# Patient Record
Sex: FEMALE | Race: WHITE | NOT HISPANIC OR LATINO | Employment: UNEMPLOYED | URBAN - METROPOLITAN AREA
[De-identification: names, ages, dates, MRNs, and addresses within clinical notes are randomized per-mention and may not be internally consistent; named-entity substitution may affect disease eponyms.]

---

## 2017-10-03 ENCOUNTER — HOSPITAL ENCOUNTER (INPATIENT)
Facility: HOSPITAL | Age: 47
LOS: 7 days | Discharge: HOME | DRG: 459 | End: 2017-10-10
Attending: EMERGENCY MEDICINE | Admitting: NEUROLOGICAL SURGERY
Payer: MEDICAID

## 2017-10-03 ENCOUNTER — ANESTHESIA EVENT (OUTPATIENT)
Dept: SURGERY | Facility: HOSPITAL | Age: 47
DRG: 459 | End: 2017-10-03
Payer: MEDICAID

## 2017-10-03 DIAGNOSIS — T14.90XA TRAUMA: ICD-10-CM

## 2017-10-03 DIAGNOSIS — R41.82 ALTERED MENTAL STATUS: ICD-10-CM

## 2017-10-03 DIAGNOSIS — S32.001A CLOSED BURST FRACTURE OF LUMBAR VERTEBRA, INITIAL ENCOUNTER: ICD-10-CM

## 2017-10-03 DIAGNOSIS — M47.816 LUMBAR SPONDYLOSIS: ICD-10-CM
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
## 2017-10-03 DIAGNOSIS — S32.001D CLOSED BURST FRACTURE OF LUMBAR VERTEBRA WITH ROUTINE HEALING, SUBSEQUENT ENCOUNTER: ICD-10-CM

## 2017-10-03 DIAGNOSIS — S92.251S CLOSED DISPLACED FRACTURE OF NAVICULAR BONE OF RIGHT FOOT, SEQUELA: ICD-10-CM
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
## 2017-10-03 DIAGNOSIS — Z01.810 PREOP CARDIOVASCULAR EXAM: ICD-10-CM
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
## 2017-10-03 DIAGNOSIS — M25.579 ANKLE PAIN: ICD-10-CM

## 2017-10-03 DIAGNOSIS — S32.001A BURST FRACTURE OF LUMBAR VERTEBRA: Primary | ICD-10-CM

## 2017-10-03 DIAGNOSIS — S32.009A LUMBAR VERTEBRAL FRACTURE: ICD-10-CM

## 2017-10-03 DIAGNOSIS — R09.02 HYPOXIA: ICD-10-CM

## 2017-10-03 DIAGNOSIS — Z01.818 PRE-OPERATIVE EXAMINATION FOR INTERNAL MEDICINE: ICD-10-CM

## 2017-10-03 DIAGNOSIS — G95.20 CORD COMPRESSION: ICD-10-CM

## 2017-10-03 DIAGNOSIS — S32.020A TRAUMATIC COMPRESSION FRACTURE OF L2 LUMBAR VERTEBRA, CLOSED, INITIAL ENCOUNTER: ICD-10-CM

## 2017-10-03 DIAGNOSIS — M48.061 SPINAL STENOSIS OF LUMBAR REGION WITHOUT NEUROGENIC CLAUDICATION: ICD-10-CM

## 2017-10-03 DIAGNOSIS — S92.251A CLOSED DISPLACED FRACTURE OF NAVICULAR BONE OF RIGHT FOOT, INITIAL ENCOUNTER: ICD-10-CM
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
## 2017-10-03 PROBLEM — S92.001A FRACTURE OF RIGHT CALCANEUS: Status: ACTIVE | Noted: 2017-10-03

## 2017-10-03 LAB
ABO + RH BLD: NORMAL
ALBUMIN SERPL BCP-MCNC: 3.6 G/DL
ALP SERPL-CCNC: 111 U/L
ALT SERPL W/O P-5'-P-CCNC: 21 U/L
ANION GAP SERPL CALC-SCNC: 11 MMOL/L
APTT BLDCRRT: 22.1 SEC
AST SERPL-CCNC: 33 U/L
BASOPHILS # BLD AUTO: 0.02 K/UL
BASOPHILS NFR BLD: 0.1 %
BILIRUB SERPL-MCNC: 0.5 MG/DL
BLD GP AB SCN CELLS X3 SERPL QL: NORMAL
BUN SERPL-MCNC: 13 MG/DL
CALCIUM SERPL-MCNC: 9.8 MG/DL
CHLORIDE SERPL-SCNC: 106 MMOL/L
CO2 SERPL-SCNC: 22 MMOL/L
CREAT SERPL-MCNC: 0.8 MG/DL
DIFFERENTIAL METHOD: ABNORMAL
EOSINOPHIL # BLD AUTO: 0 K/UL
EOSINOPHIL NFR BLD: 0 %
ERYTHROCYTE [DISTWIDTH] IN BLOOD BY AUTOMATED COUNT: 13.9 %
EST. GFR  (AFRICAN AMERICAN): >60 ML/MIN/1.73 M^2
EST. GFR  (NON AFRICAN AMERICAN): >60 ML/MIN/1.73 M^2
ETHANOL SERPL-MCNC: <10 MG/DL
GLUCOSE SERPL-MCNC: 116 MG/DL
HCT VFR BLD AUTO: 43.3 %
HGB BLD-MCNC: 14.9 G/DL
INR PPP: 1
LYMPHOCYTES # BLD AUTO: 1.8 K/UL
LYMPHOCYTES NFR BLD: 8.6 %
MCH RBC QN AUTO: 29.9 PG
MCHC RBC AUTO-ENTMCNC: 34.4 G/DL
MCV RBC AUTO: 87 FL
MONOCYTES # BLD AUTO: 1.2 K/UL
MONOCYTES NFR BLD: 5.5 %
NEUTROPHILS # BLD AUTO: 17.9 K/UL
NEUTROPHILS NFR BLD: 85 %
PLATELET # BLD AUTO: 396 K/UL
PMV BLD AUTO: 9.7 FL
POTASSIUM SERPL-SCNC: 3.9 MMOL/L
PROT SERPL-MCNC: 7.6 G/DL
PROTHROMBIN TIME: 11 SEC
RBC # BLD AUTO: 4.99 M/UL
SODIUM SERPL-SCNC: 139 MMOL/L
WBC # BLD AUTO: 21.04 K/UL

## 2017-10-03 PROCEDURE — 86920 COMPATIBILITY TEST SPIN: CPT

## 2017-10-03 PROCEDURE — 85730 THROMBOPLASTIN TIME PARTIAL: CPT

## 2017-10-03 PROCEDURE — 80320 DRUG SCREEN QUANTALCOHOLS: CPT

## 2017-10-03 PROCEDURE — 25500020 PHARM REV CODE 255: Performed by: EMERGENCY MEDICINE

## 2017-10-03 PROCEDURE — 99291 CRITICAL CARE FIRST HOUR: CPT | Mod: ,,, | Performed by: PHYSICIAN ASSISTANT

## 2017-10-03 PROCEDURE — 80053 COMPREHEN METABOLIC PANEL: CPT

## 2017-10-03 PROCEDURE — 63600175 PHARM REV CODE 636 W HCPCS: Performed by: PHYSICIAN ASSISTANT

## 2017-10-03 PROCEDURE — 25000003 PHARM REV CODE 250: Performed by: STUDENT IN AN ORGANIZED HEALTH CARE EDUCATION/TRAINING PROGRAM

## 2017-10-03 PROCEDURE — 63600175 PHARM REV CODE 636 W HCPCS: Performed by: EMERGENCY MEDICINE

## 2017-10-03 PROCEDURE — 20600001 HC STEP DOWN PRIVATE ROOM

## 2017-10-03 PROCEDURE — 99223 1ST HOSP IP/OBS HIGH 75: CPT | Mod: ,,, | Performed by: PHYSICIAN ASSISTANT

## 2017-10-03 PROCEDURE — 99233 SBSQ HOSP IP/OBS HIGH 50: CPT | Mod: ,,, | Performed by: INTERNAL MEDICINE

## 2017-10-03 PROCEDURE — 86901 BLOOD TYPING SEROLOGIC RH(D): CPT

## 2017-10-03 PROCEDURE — 25000003 PHARM REV CODE 250: Performed by: EMERGENCY MEDICINE

## 2017-10-03 PROCEDURE — 93005 ELECTROCARDIOGRAM TRACING: CPT

## 2017-10-03 PROCEDURE — 86900 BLOOD TYPING SEROLOGIC ABO: CPT

## 2017-10-03 PROCEDURE — 93010 ELECTROCARDIOGRAM REPORT: CPT | Mod: ,,, | Performed by: INTERNAL MEDICINE

## 2017-10-03 PROCEDURE — 85025 COMPLETE CBC W/AUTO DIFF WBC: CPT

## 2017-10-03 PROCEDURE — 85610 PROTHROMBIN TIME: CPT

## 2017-10-03 PROCEDURE — 99291 CRITICAL CARE FIRST HOUR: CPT | Mod: 25

## 2017-10-03 PROCEDURE — 96374 THER/PROPH/DIAG INJ IV PUSH: CPT

## 2017-10-03 RX ORDER — OXYCODONE AND ACETAMINOPHEN 5; 325 MG/1; MG/1
1 TABLET ORAL EVERY 4 HOURS PRN
Status: DISCONTINUED | OUTPATIENT
Start: 2017-10-03 | End: 2017-10-03

## 2017-10-03 RX ORDER — CLONAZEPAM 1 MG/1
2 TABLET ORAL NIGHTLY
Status: DISCONTINUED | OUTPATIENT
Start: 2017-10-04 | End: 2017-10-10 | Stop reason: HOSPADM

## 2017-10-03 RX ORDER — MORPHINE SULFATE 2 MG/ML
2 INJECTION, SOLUTION INTRAMUSCULAR; INTRAVENOUS
Status: COMPLETED | OUTPATIENT
Start: 2017-10-03 | End: 2017-10-03

## 2017-10-03 RX ORDER — LABETALOL HYDROCHLORIDE 5 MG/ML
10 INJECTION, SOLUTION INTRAVENOUS EVERY 10 MIN PRN
Status: DISCONTINUED | OUTPATIENT
Start: 2017-10-03 | End: 2017-10-10 | Stop reason: HOSPADM

## 2017-10-03 RX ORDER — GLUCAGON 1 MG
1 KIT INJECTION
Status: DISCONTINUED | OUTPATIENT
Start: 2017-10-03 | End: 2017-10-10 | Stop reason: HOSPADM

## 2017-10-03 RX ORDER — DIAZEPAM 5 MG/1
5 TABLET ORAL EVERY 6 HOURS PRN
Status: DISCONTINUED | OUTPATIENT
Start: 2017-10-03 | End: 2017-10-10 | Stop reason: HOSPADM

## 2017-10-03 RX ORDER — ACETAMINOPHEN 500 MG
1000 TABLET ORAL
Status: COMPLETED | OUTPATIENT
Start: 2017-10-03 | End: 2017-10-03

## 2017-10-03 RX ORDER — OXYCODONE AND ACETAMINOPHEN 5; 325 MG/1; MG/1
1 TABLET ORAL EVERY 4 HOURS PRN
Status: DISCONTINUED | OUTPATIENT
Start: 2017-10-04 | End: 2017-10-04

## 2017-10-03 RX ORDER — SODIUM CHLORIDE 9 MG/ML
INJECTION, SOLUTION INTRAVENOUS CONTINUOUS
Status: DISCONTINUED | OUTPATIENT
Start: 2017-10-03 | End: 2017-10-05

## 2017-10-03 RX ORDER — MORPHINE SULFATE 2 MG/ML
2 INJECTION, SOLUTION INTRAMUSCULAR; INTRAVENOUS EVERY 4 HOURS PRN
Status: DISCONTINUED | OUTPATIENT
Start: 2017-10-03 | End: 2017-10-04

## 2017-10-03 RX ORDER — IBUPROFEN 200 MG
16 TABLET ORAL
Status: DISCONTINUED | OUTPATIENT
Start: 2017-10-03 | End: 2017-10-10 | Stop reason: HOSPADM

## 2017-10-03 RX ORDER — IBUPROFEN 200 MG
24 TABLET ORAL
Status: DISCONTINUED | OUTPATIENT
Start: 2017-10-03 | End: 2017-10-10 | Stop reason: HOSPADM

## 2017-10-03 RX ADMIN — ACETAMINOPHEN 1000 MG: 500 TABLET ORAL at 12:10

## 2017-10-03 RX ADMIN — DIAZEPAM 5 MG: 5 TABLET ORAL at 10:10

## 2017-10-03 RX ADMIN — OXYCODONE HYDROCHLORIDE AND ACETAMINOPHEN 1 TABLET: 5; 325 TABLET ORAL at 10:10

## 2017-10-03 RX ADMIN — MORPHINE SULFATE 2 MG: 2 INJECTION, SOLUTION INTRAMUSCULAR; INTRAVENOUS at 11:10

## 2017-10-03 RX ADMIN — MORPHINE SULFATE 2 MG: 2 INJECTION, SOLUTION INTRAMUSCULAR; INTRAVENOUS at 01:10

## 2017-10-03 RX ADMIN — SODIUM CHLORIDE: 0.9 INJECTION, SOLUTION INTRAVENOUS at 10:10

## 2017-10-03 RX ADMIN — IOHEXOL 75 ML: 350 INJECTION, SOLUTION INTRAVENOUS at 01:10

## 2017-10-03 NOTE — ASSESSMENT & PLAN NOTE
47 y.o. female s/p 30-ft fall from tree with L2 burst fracture with central retropulsion.    - Admit to nsgy.  - LSO brace at all times. Bed rest.  - MRI pending.  - OR tomorrow for lateral L2 corpectomy and L1-3 posterior fusion. Case booked. NPO at midnight.  - UPT, APTT, INR pending. 2 units pRBC on hold for OR tomorrow.  - Please measure strict urine output and check PVR. Please notify neurosurgery of any episodes of incontinence.  - No steroids at this time.  - Ortho: WBAT for +/- pubic ramus fracture. Navicular fracture splinted, no surgical intervention warranted.  - General surgery consulted by ED, pending recs.  - Hospital medicine consulted for surgical clearance. Appreciate recs.

## 2017-10-03 NOTE — HPI
47 years old female with Hx of drug abuse, anxiety on chronic benzo and withdrawal seizures. Admitted from ER for NS interventions for lumbar Fx after fall from 30ft tree today in her back. Patient was sleepy during my interview after morphine. She had no Hx of chronic cardiac or pulm disease. Last hospital admission was for withdrawal seizures 2/2 benzo.   She denied any CP, SOB, MCCALL, abdominal pain, SOB, fever, cough,  Sx. She had croup 3 days ago with fever and barking cough, used OTC cough suppressant.   She reported snorting blue meth last week, denied alcohol abuse, Hx of STI/STDs or any Hx of endocarditis or bone infection.

## 2017-10-03 NOTE — ED NOTES
Patient placed on bedpain to obtain urine specimen. States unable to go at this time. Awaiting urine.

## 2017-10-03 NOTE — ASSESSMENT & PLAN NOTE
Candy Blakely is a 46 yo female with right navicular fracture and cortical irregularities of right inferior pubic ramus  - Placed in right short leg dorsal slab splint  - NWB RLE  - No acute orthopedic intervention  - No visible pelvis fracture noted on CT, WBAT through pelvis  - For management of L2 fracture, please see neurosurgical note  - Recommend PT/OT when stable from neurosurgical standpoint  - Recommend consultation to general surgery for trauma evaluation

## 2017-10-03 NOTE — HPI
Candy Blakely is a 47 y.o. female PMH substance abuse who presents s/p approximate 30-foot fall from a tree while intoxicated today. Pt presented with complaints of low back and pelvic pain. Plain imaging revealed fractures of the right inferior pubic ramus, right navicula, and severe burst fracture of L2. CT revealed aforementioned burst fracture with centrally displaced bone fragments and severe cord compression. Neurosurgery was consulted for evaluation.

## 2017-10-03 NOTE — CONSULTS
[facility]  Neurosurgery  Consult Note    Inpatient consult to Neurosurgery  Consult performed by: YAENT GILES  Consult ordered by: ROMEO WASHINGTON        Subjective:     Chief Complaint/Reason for Admission: Fall    History of Present Illness: Candy Blakely is a 47 y.o. female PMH substance abuse who presents s/p approximate 30-foot fall from a tree while intoxicated today. Pt presented with complaints of low back and pelvic pain. Plain imaging revealed fractures of the right inferior pubic ramus, right navicula, and severe burst fracture of L2. CT revealed aforementioned burst fracture with centrally displaced bone fragments and severe cord compression. Neurosurgery was consulted for evaluation.      (Not in a hospital admission)    Review of patient's allergies indicates:  No Known Allergies    Past Medical History:   Diagnosis Date    ADHD (attention deficit hyperactivity disorder)     Anxiety      Past Surgical History:   Procedure Laterality Date    TUBAL LIGATION  2003     Family History     None        Social History Main Topics    Smoking status: Current Every Day Smoker     Packs/day: 0.50     Types: Cigarettes    Smokeless tobacco: Not on file    Alcohol use No    Drug use:      Types: IV    Sexual activity: Not on file      Comment: Heroin occasionally     Review of Systems   Constitutional: Negative for chills and fever.   HENT: Negative for facial swelling, trouble swallowing and voice change.    Eyes: Negative for photophobia and discharge.   Respiratory: Negative for shortness of breath and wheezing.    Cardiovascular: Negative for chest pain and palpitations.   Gastrointestinal: Negative for abdominal distention and abdominal pain.        No bowel incontinence   Genitourinary: Negative for difficulty urinating and dysuria.   Musculoskeletal: Positive for back pain and myalgias.   Neurological: Negative for speech difficulty and numbness.     Objective:         There is no height or weight on file to calculate BMI.  Vital Signs (Most Recent):  Temp: 97 °F (36.1 °C) (10/03/17 1125)  Pulse: 66 (10/03/17 1125)  Resp: 16 (10/03/17 1125)  BP: (!) 104/52 (10/03/17 1125)  SpO2: 100 % (10/03/17 1125) Vital Signs (24h Range):  Temp:  [97 °F (36.1 °C)] 97 °F (36.1 °C)  Pulse:  [66] 66  Resp:  [16] 16  SpO2:  [100 %] 100 %  BP: (104)/(52) 104/52                           Neurosurgery Physical Exam  General: well developed, well nourished, no distress.   Head: normocephalic, atraumatic  Neurologic: Alert and oriented. Thought content appropriate.  GCS: Motor: 6/Verbal: 5/Eyes: 4 GCS Total: 15  Mental Status: Awake, Alert, Oriented x 4  Language: No aphasia  Speech: No dysarthria  Cranial nerves: face symmetric, tongue midline, CN II-XII grossly intact.   Eyes: pupils equal, round, reactive to light with accomodation, EOMI.  Pulmonary: normal respirations, no signs of respiratory distress  Abdomen: soft, non-distended, not tender to palpation  Sensory: intact to light touch throughout    Motor Strength: Moves all extremities spontaneously with good tone. No abnormal movements seen.     Strength  Deltoids Triceps Biceps Wrist Extension Wrist Flexion Hand    Upper: R 5/5 5/5 5/5 5/5 5/5 5/5    L 5/5 5/5 5/5 5/5 5/5 5/5     Iliopsoas Quadriceps Knee  Flexion Tibialis  anterior Gastro- cnemius EHL   Lower: R 3/5 4-/5 4/5 5/5 N/A N/A    L 3/5 4-/5 4/5 5/5 5/5 5/5     DTR's: 2+ and symmetric in UE; 3 + and symmetric in LE, unable to assess R achilles 2/2 splinting  Pronator Drift: no drift noted  Finger-to-nose: Intact bilaterally  Lord: absent  Clonus: absent on left, unable to assess on right due to R splint  Babinski: absent on left, unable to assess on right due to R splint  Pulses: 2+ and symmetric radial and dorsalis pedis.  Skin: Skin is warm, dry and intact.    Significant Labs:    Recent Labs  Lab 10/03/17  1500   *      K 3.9      CO2 22*   BUN 13    CREATININE 0.8   CALCIUM 9.8       Recent Labs  Lab 10/03/17  1234   WBC 21.04*   HGB 14.9   HCT 43.3   *     No results for input(s): LABPT, INR, APTT in the last 48 hours.  Microbiology Results (last 7 days)     ** No results found for the last 168 hours. **        Significant Diagnostics:    Xray lumbar spine reviewed and shows severe compression deformity of L2.  CT cervical spine reviewed and shows no evidence for acute fracture.  CT lumbar spine reviewed and shows burst fracture of L2 with retropulsion resulting in severe central stenosis. There is grade I anterolisthesis of L5 on S1.  MRI lumbar spine pending.  CT c/a/p reviewed and shows no compression deformity in the thoracic spine.     Assessment/Plan:     * Burst fracture of lumbar vertebra    47 y.o. female s/p 30-ft fall from tree with L2 burst fracture with central retropulsion.    - Admit to nsgy.  - LSO brace at all times. Bed rest.  - MRI pending.  - OR tomorrow for lateral L2 corpectomy and L1-3 posterior fusion. Case booked. NPO at midnight.  - UPT, APTT, INR pending. 2 units pRBC on hold for OR tomorrow.  - Please measure strict urine output and check PVR. Please notify neurosurgery of any episodes of incontinence.  - No steroids at this time.  - Ortho: WBAT for +/- pubic ramus fracture. Navicular fracture splinted, no surgical intervention warranted.  - General surgery consulted by ED, pending recs.  - Hospital medicine consulted for surgical clearance. Appreciate recs.              Discussed with Dr. Gasca.    Nancy Manning PA-C  Neurosurgery  [facility]

## 2017-10-03 NOTE — ED TRIAGE NOTES
"Reports falling from "[place]" in [place]. States branches were dry near the top and branch broke and she fell approx 30feet. Reports back pain and right ankle pain.  "

## 2017-10-03 NOTE — CONSULTS
"[facility] Hospital Medicine  Consult Note    Patient Name: Candy Blakely  MRN: [id number]  Admission Date: 10/3/2017  Hospital Length of Stay: 0 days  Attending Physician: Charlette Kunz MD   Primary Care Provider: Primary Doctor [facility] Medicine Team: Networked reference to record PCT  MATHIEU Dias      Patient information was obtained from patient, parent, past medical records and ER records.     Inpatient consult to [facility] Medicine-General  Consult performed by: KAROLINA MIRANDA  Consult ordered by: YANET GILES  Reason for consult: preop        Subjective:     Principal Problem: Burst fracture of lumbar vertebra    Chief Complaint:   Chief Complaint   Patient presents with    Fall     patient states she "fell out of the tree of life" while trying to climb up; denies LOC        HPI: 47 years old female with Hx of drug abuse, anxiety on chronic benzo and withdrawal seizures. Admitted from ER for NS interventions for lumbar Fx after fall from 30ft tree today in her back. Patient was sleepy during my interview after morphine. She had no Hx of chronic cardiac or pulm disease. Last hospital admission was for withdrawal seizures 2/2 benzo.   She denied any CP, SOB, MCCALL, abdominal pain, SOB, fever, cough,  Sx. She had croup 3 days ago with fever and barking cough, used OTC cough suppressant.   She reported snorting blue meth last week, denied alcohol abuse, Hx of STI/STDs or any Hx of endocarditis or bone infection.       Past Medical History:   Diagnosis Date    ADHD (attention deficit hyperactivity disorder)     Anxiety        Past Surgical History:   Procedure Laterality Date    TUBAL LIGATION  2003       Review of patient's allergies indicates:  No Known Allergies    No current facility-administered medications on file prior to encounter.      Current Outpatient Prescriptions on File Prior to Encounter   Medication Sig    clonazePAM (KLONOPIN) 1 MG " tablet Take 2 mg by mouth every evening.    dextroamphetamine-amphetamine (AMPHETAMINE SALT COMBO) 30 mg Tab Take by mouth once daily.     Family History     None        Social History Main Topics    Smoking status: Current Every Day Smoker     Packs/day: 0.50     Types: Cigarettes    Smokeless tobacco: Not on file    Alcohol use No    Drug use:      Types: IV    Sexual activity: Not on file      Comment: Heroin occasionally     Review of Systems   Constitutional: Negative for chills, fever and unexpected weight change.   HENT: Negative for ear discharge, ear pain, mouth sores and trouble swallowing.    Eyes: Negative for pain and redness.   Respiratory: Negative for cough and shortness of breath.    Cardiovascular: Negative for chest pain and palpitations.   Gastrointestinal: Negative for abdominal distention, abdominal pain, blood in stool and nausea.   Endocrine: Negative for polydipsia and polyphagia.   Genitourinary: Negative for dysuria and hematuria.   Musculoskeletal: Negative for gait problem and neck stiffness.   Skin: Negative for rash.   Neurological: Negative for dizziness, weakness and numbness.   Hematological: Negative for adenopathy. Does not bruise/bleed easily.   Psychiatric/Behavioral: Negative for decreased concentration and sleep disturbance.     Objective:     Vital Signs (Most Recent):  Temp: 97 °F (36.1 °C) (10/03/17 1125)  Pulse: 66 (10/03/17 1125)  Resp: 16 (10/03/17 1125)  BP: (!) 104/52 (10/03/17 1125)  SpO2: 100 % (10/03/17 1125) Vital Signs (24h Range):  Temp:  [97 °F (36.1 °C)] 97 °F (36.1 °C)  Pulse:  [66] 66  Resp:  [16] 16  SpO2:  [100 %] 100 %  BP: (104)/(52) 104/52        There is no height or weight on file to calculate BMI.    Physical Exam   Constitutional: She is oriented to person, place, and time. She appears well-developed and well-nourished. No distress. Cervical collar in place.   HENT:   Head: Normocephalic and atraumatic.   Eyes: Pupils are equal, round, and  reactive to light. No scleral icterus.   Neck: Neck supple.   Cardiovascular: Normal rate, regular rhythm and normal heart sounds.    No murmur heard.  Pulmonary/Chest: Effort normal and breath sounds normal.   Abdominal: Soft. Bowel sounds are normal. She exhibits no distension. There is no tenderness.   Musculoskeletal: Normal range of motion. She exhibits no edema or tenderness.   Cast in the right leg   Lymphadenopathy:     She has no cervical adenopathy.   Neurological: She is alert and oriented to person, place, and time. She exhibits normal muscle tone. Coordination normal.   Skin: No rash noted. No erythema.       Significant Labs:   BMP:   Recent Labs  Lab 10/03/17  1500   *      K 3.9      CO2 22*   BUN 13   CREATININE 0.8   CALCIUM 9.8     CBC:   Recent Labs  Lab 10/03/17  1234   WBC 21.04*   HGB 14.9   HCT 43.3   *       Significant Imaging:     Narrative     Findings: Patient was administered 75 cc of Omnipaque 350.    No mediastinal, hilar or axillary adenopathy is seen.  No hematoma is identified.  There may be chronic gallbladder changes.  Liver, spleen, pancreas show nothing unusual.  The pancreatic duct is slightly prominent.  The common bile duct is dilated 1.3 cm.  No adrenal pathology is seen.  Kidneys enhance normally.  No ascites is seen.  Bowel loops are unremarkable.  Pelvic organs show nothing unusual.  Kidneys and ureters are normal.  No ureter obstruction is seen.  Bladder is normal.  There is a severe comminuted compression wedge burst fracture of L2 with a posterior fragment in the canal causing severe canal stenosis.  No pelvic fracture seen.  There is slight cortical irregularity of the right inferior pubic ramus thought to be developmental.  No pneumothorax or lung contusion seen.   Impression      Unstable severe wedge compression burst fracture of the L2 vertebral body involving the anterior middle column making this unstable.  There is a fragment in the  canal.    No pelvic fracture seen.  No organ injury.    Incidental findings include dilatation of the common bile duct.  Correlate with liver function studies this is unlikely to be traumatic.  There are also chronic changes of the gallbladder.  GI consultation may be warranted at a later time.        Electronically signed by: LILI PLATT  Date: 10/03/17  Time: 14:32      Findings: Patient was administered 75 cc of Omnipaque 350.    No mediastinal, hilar or axillary adenopathy is seen.  No hematoma is identified.  There may be chronic gallbladder changes.  Liver, spleen, pancreas show nothing unusual.  The pancreatic duct is slightly prominent.  The common bile duct is dilated 1.3 cm.  No adrenal pathology is seen.  Kidneys enhance normally.  No ascites is seen.  Bowel loops are unremarkable.  Pelvic organs show nothing unusual.  Kidneys and ureters are normal.  No ureter obstruction is seen.  Bladder is normal.  There is a severe comminuted compression wedge burst fracture of L2 with a posterior fragment in the canal causing severe canal stenosis.  No pelvic fracture seen.  There is slight cortical irregularity of the right inferior pubic ramus thought to be developmental.  No pneumothorax or lung contusion seen.   Impression      Unstable severe wedge compression burst fracture of the L2 vertebral body involving the anterior middle column making this unstable.  There is a fragment in the canal.    No pelvic fracture seen.  No organ injury.    Incidental findings include dilatation of the common bile duct.  Correlate with liver function studies this is unlikely to be traumatic.  There are also chronic changes of the gallbladder.  GI consultation may be warranted at a later time.        Electronically signed by: LILI PLATT  Date: 10/03/17  Time: 14:32      Narrative     CT cervical spine without contrast    Clinical history: Fall    Comparison: None    Technique:  Axial images of the cervical spine were  obtained at 2 mm intervals without administration of IV contrast.  Coronal and sagittal reformatted images were reviewed.    Findings:  Degenerative changes are noted of the right temporomandibular joint.  There is opacification of the maxillary sinuses.  There is a mucous attention cyst or polyp within the left sphenoid sinus.    Lateral imaging demonstrates adequate alignment of the cervical spine without significant vertebral body height loss or disc space height loss.  No acute displaced fracture or dislocation of the cervical spine.  No severe spinal canal stenosis or severe neuroforaminal narrowing at any level.  The lung apices are grossly clear.  The paraspinous and hypopharyngeal soft tissues are grossly unremarkable noting minimal subcutaneous edema within the soft tissues posterior to C6-T2.  Mild degenerative changes are noted of the cervical spine.  The airway is patent.  Limited dilation of the intracranial content is grossly unremarkable.   Impression       1.  No acute displaced fracture or dislocation of the cervical spine.    2.  Sinus disease.      Electronically signed by: KEYONA TUCKER MD  Date: 10/03/17  Time: 13:50      Narrative     Comparison: None    Clinical history: Altered metal status    Technique:    Axial images of the brain were obtained at 5-mm intervals from the skull base to the vertex without the administration of contrast.    Findings:    Punctate foci of hypoattenuation involving the left thalamus likely reflects volume averaging, differential would include age indeterminate lacunar infarct however felt less likely.  There is no evidence of acute major vascular territory infarct, hemorrhage, or mass.  There is no hydrocephalus.  There are no abnormal extra-axial fluid collections.  There are aerated secretions within the bilateral maxillary sinuses and ethmoid sinuses, otherwise the visualized paranasal sinuses and mastoid air cells are grossly clear.  The visualized soft  tissues are unremarkable.   Impression         No acute intracranial abnormalities, noting punctate foci of hypoattenuation in the left basal ganglia suggest volume averaging or sequela of motion artifact rather than age-indeterminate infarct however correlation with current symptoms recommended..    Sinus disease.            Electronically signed by: KEYONA TUCKER MD  Date: 10/03/17  Time: 13:43          Assessment/Plan:     Pre-op evaluation      -- no Hx of heart or lung active diseases.   -- confirmed with pharmacy the prescription of Klonopin 2mg TID but patient using it as 2 mg QHS PRN. Last time was 3 night ago.   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  -- EKG at the bedside show NSR with QTc 486   -- revised dusty score is 0 and that put her at very low risk (0.4% for major complication )     Plan:   -- please add Klonpin 2 mg nightly as patient had Hx of withdrawal seizure.   -- follow WBC as it likely reactive.   -- urine Tox.                VTE Risk Mitigation     None              Thank you for your consult. I will sign off. Please contact us if you have any additional questions.    MATHIEU Dias  Department of Hospital Medicine   [facility]

## 2017-10-03 NOTE — ED NOTES
LOC: The patient is awake, alert and aware of environment with an appropriate affect, the patient is oriented x 3 and speaking appropriately.  APPEARANCE: Patient resting comfortably and in no acute distress, patient is unkept and disheveled. Multiple teeth missing and cracked.   SKIN: The skin is warm and dry, patient has normal skin turgor and dry mucus membranes, skin intact, no breakdown or brusing noted.  MUSKULOSKELETAL: Patient moving all extremities, pain while moving right ankle. Swelling noted to right ankle.  No obvious deformities noted.  RESPIRATORY: Airway is open and patent, respirations are spontaneous, patient has a normal effort and rate. Breath sounds are clear and equal bilaterally.  CARDIAC: Normal heart sounds. No peripheral edema.  ABDOMEN: Soft and non tender to palpation, no distention noted. Bowel sounds present.  NEURO: No neuro deficits, hand grasp equal, no drift noted, no facial droop noted. Speech is clear.

## 2017-10-03 NOTE — ANESTHESIA PREPROCEDURE EVALUATION
Pre-operative evaluation for Procedure(s) (LRB):  DIRECT LATERAL INTERBODY FUSION/Lateral L2 corpectomy with posterior L1-3 fusion (N/A)    Candy Blakely is a 47 y.o. female w/ no significant PMHx. Per notes, Hx of substance abuse (pt admits to use of methamphetamine several days ago). She presents to the ED via personal vehicle s/p fall. She reports falling from approximately 30 feet from a tree in [facility]. She is a current every day smoker, 0.5 pdd. No hx of anesthesia complications.     LDA:   R forearm 20 g PIV   L hand 22 g PIV     Prev airway: none on record       There is no problem list on file for this patient.    CT C spine  Technique:  Axial images of the cervical spine were obtained at 2 mm intervals without administration of IV contrast.  Coronal and sagittal reformatted images were reviewed.    Findings:  Degenerative changes are noted of the right temporomandibular joint.  There is opacification of the maxillary sinuses.  There is a mucous attention cyst or polyp within the left sphenoid sinus.    Lateral imaging demonstrates adequate alignment of the cervical spine without significant vertebral body height loss or disc space height loss.  No acute displaced fracture or dislocation of the cervical spine.  No severe spinal canal stenosis or severe neuroforaminal narrowing at any level.  The lung apices are grossly clear.  The paraspinous and hypopharyngeal soft tissues are grossly unremarkable noting minimal subcutaneous edema within the soft tissues posterior to C6-T2.  Mild degenerative changes are noted of the cervical spine.  The airway is patent.  Limited dilation of the intracranial content is grossly unremarkable.    CT L spine  1. Acute compression deformity of the L2 vertebral body with retropulsion resulting in severe spinal canal stenosis.    2. Remaining lumbar spine demonstrates mild spondylosis without significant  spinal canal stenosis or neuroforaminal narrowing.    3.  Grade I anterolisthesis of L5 on S1.    Preliminary report discussed with Dr. WASHINGTON by Dr. Rivas at 14:01:02 on Tuesday, October 03, 2017.  ______________________________________     Electronically signed by resident: JUDITH RIVAS MD  Date: 10/03/17  Time: 14:05            As the supervising and teaching physician, I personally reviewed the images and resident's interpretation and I agree with the findings.    Review of patient's allergies indicates:  No Known Allergies     No current facility-administered medications on file prior to encounter.      Current Outpatient Prescriptions on File Prior to Encounter   Medication Sig Dispense Refill    clonazePAM (KLONOPIN) 1 MG tablet Take 2 mg by mouth every evening.      dextroamphetamine-amphetamine (AMPHETAMINE SALT COMBO) 30 mg Tab Take by mouth once daily.         Past Surgical History:   Procedure Laterality Date    TUBAL LIGATION  2003       Social History     Social History    Marital status: Single     Spouse name: N/A    Number of children: N/A    Years of education: N/A     Occupational History    Not on file.     Social History Main Topics    Smoking status: Current Every Day Smoker     Packs/day: 0.50     Types: Cigarettes    Smokeless tobacco: Not on file    Alcohol use No    Drug use:      Types: IV    Sexual activity: Not on file      Comment: Heroin occasionally     Other Topics Concern    Not on file     Social History Narrative    No narrative on file         Vital Signs Range (Last 24H):  Temp:  [36.1 °C (97 °F)]   Pulse:  [66]   Resp:  [16]   BP: (104)/(52)   SpO2:  [100 %]       CBC:   Recent Labs      10/03/17   1234   WBC  21.04*   RBC  4.99   HGB  14.9   HCT  43.3   PLT  396*   MCV  87   MCH  29.9   MCHC  34.4       CMP: No results for input(s): NA, K, CL, CO2, BUN, CREATININE, GLU, MG, PHOS, CALCIUM, ALBUMIN, PROT, ALKPHOS, ALT, AST, BILITOT in the last 72 hours.    INR  No  results for input(s): INR, PROTIME, APTT in the last 72 hours.    Invalid input(s): PT        Diagnostic Studies:      EKG:  None on record     2D Echo:  None on record       Anesthesia Evaluation    I have reviewed the Patient Summary Reports.     I have reviewed the Medications.     Review of Systems  Anesthesia Hx:  No problems with previous Anesthesia Denies Hx of Anesthetic complications  History of prior surgery of interest to airway management or planning:  Denies Personal Hx of Anesthesia complications.   Social:  Smoker    Hematology/Oncology:     Oncology Normal     EENT/Dental:EENT/Dental Normal   Cardiovascular:  Cardiovascular Normal     Pulmonary:  Pulmonary Normal    Renal/:  Renal/ Normal     Hepatic/GI:  Hepatic/GI Normal    Musculoskeletal:  Musculoskeletal Normal    Neurological:  Neurology Normal    Endocrine:  Endocrine Normal    Dermatological:  Skin Normal    Psych:   Psychiatric History          Physical Exam  General:  Well nourished    Airway/Jaw/Neck:  Airway Findings: Mouth Opening: Normal Tongue: Normal  General Airway Assessment: Adult  Mallampati: III   Currently in C collar.    Eyes/Ears/Nose:  EYES/EARS/NOSE FINDINGS: Normal   Dental:  Dental Findings: Periodontal disease, Mild         Mental Status:  Mental Status Findings:  Cooperative, Alert and Oriented         Anesthesia Plan  Type of Anesthesia, risks & benefits discussed:  Anesthesia Type:  general  Patient's Preference:   Intra-op Monitoring Plan: standard ASA monitors and arterial line  Intra-op Monitoring Plan Comments:   Post Op Pain Control Plan:   Post Op Pain Control Plan Comments:   Induction:   IV  Beta Blocker:  Patient is not currently on a Beta-Blocker (No further documentation required).       Informed Consent: Patient understands risks and agrees with Anesthesia plan.  Questions answered. Anesthesia consent signed with patient.  ASA Score: 2     Day of Surgery Review of History & Physical:    H&P update  referred to the surgeon.     Anesthesia Plan Notes: Pt currently in C collar. Discussed awake intubation with pt given C collar currently on and pending management of C spine per NSGY. Consent signed by patient and left with emergency room team after obtaining witness signature.         Ready For Surgery From Anesthesia Perspective.

## 2017-10-03 NOTE — SUBJECTIVE & OBJECTIVE
Principal Problem:Burst fracture of lumbar vertebra    Principal Orthopedic Problem: navicular fracture and possible right sided inferior pubic ramus fracture    HPI: Candy Blakely is a 46 yo female with history of heroine use (per chart review) presenting with low back pain and right foot pain after fall from tree. Patient was climbing a tree in [facility], had her foot slip and fell from substantial height onto her low back. Patient states a friend saw her and thought the fall was about 30 feet. Denies numbness, tingling, or paresthesias to upper extremities, lower extremity or groin. Denies bowel or bladder incontinence. Denies other MSK pains. Denies head injury or LOC.    ROS: denies chest pain, SOB, nausea, vomiting, for full ROS please see H&P    Review of patient's allergies indicates:  No Known Allergies    No current facility-administered medications for this encounter.      Current Outpatient Prescriptions   Medication Sig    clonazePAM (KLONOPIN) 1 MG tablet Take 2 mg by mouth every evening.    dextroamphetamine-amphetamine (AMPHETAMINE SALT COMBO) 30 mg Tab Take by mouth once daily.     Objective:     Vital Signs (Most Recent):  Temp: 97 °F (36.1 °C) (10/03/17 1125)  Pulse: 66 (10/03/17 1125)  Resp: 16 (10/03/17 1125)  BP: (!) 104/52 (10/03/17 1125)  SpO2: 100 % (10/03/17 1125) Vital Signs (24h Range):  Temp:  [97 °F (36.1 °C)] 97 °F (36.1 °C)  Pulse:  [66] 66  Resp:  [16] 16  SpO2:  [100 %] 100 %  BP: (104)/(52) 104/52           There is no height or weight on file to calculate BMI.    No intake or output data in the 24 hours ending 10/03/17 1529    Ortho/SPM Exam   Vitals: Afebrile.  Vital signs stable.  General: No acute distress.  HEENT: Normocephalic. Atraumatic. Sclera anicteric. No tracheal deviation.  Chest: No increased work of breathing.  Abdominal: Nondistended.  Extremities: No cyanosis.  No clubbing.  No edema.  Palpable pulses.  Skin: No generalized rash.  Neuro: Awake. Alert.  Oriented to person, place, time, and situation.  Psych: Normal appearance. Cooperative.  Appropriate mood.  Appropriate affect.      Pelvis: no tenderness to palpation throughout, no pain with lateral compression through iliac wings or greater trochanters    RLE:  Skin intact throughout  Tenderness to palpation over dorsum of foot  Pain with ROM of ankle secondary to dorsal foot swelling  Painless ROM of knee and hip  Moderate swelling to dorsum of foot, compartments soft  Brisk capillary refill intact distally  2+ DP pulse  SILT throughout    Log roll deferred     Painless ROM and no tenderness to palpation over bilateral clavicles, shoulders, elbows, wrists. Painless ROM and no tenderness to palpation of left hip, knee, or foot.    Significant Labs: All pertinent labs within the past 24 hours have been reviewed.    Significant Imaging: X-Ray: I have reviewed all pertinent results/findings and my personal findings are:  navicular fracture with comminution and displacement of fragments medially;   X-ray pelvis:  cortical irregularity of right inferior pubic ramus  CT pelvis reviewed: no acute fracture of bilateral inferior or superior pubic rami noted, no sacral fracture noted    Lumbar spine films reviewed: L2 burst fracture, L5-S1 grade 1-2 spondylolisthesis of unknown chronicity, transitional anatomy noted with lumbarization of S1

## 2017-10-03 NOTE — SUBJECTIVE & OBJECTIVE
Past Medical History:   Diagnosis Date    ADHD (attention deficit hyperactivity disorder)     Anxiety        Past Surgical History:   Procedure Laterality Date    TUBAL LIGATION  2003       Review of patient's allergies indicates:  No Known Allergies    No current facility-administered medications on file prior to encounter.      Current Outpatient Prescriptions on File Prior to Encounter   Medication Sig    clonazePAM (KLONOPIN) 1 MG tablet Take 2 mg by mouth every evening.    dextroamphetamine-amphetamine (AMPHETAMINE SALT COMBO) 30 mg Tab Take by mouth once daily.     Family History     None        Social History Main Topics    Smoking status: Current Every Day Smoker     Packs/day: 0.50     Types: Cigarettes    Smokeless tobacco: Not on file    Alcohol use No    Drug use:      Types: IV    Sexual activity: Not on file      Comment: Heroin occasionally     Review of Systems   Constitutional: Negative for chills, fever and unexpected weight change.   HENT: Negative for ear discharge, ear pain, mouth sores and trouble swallowing.    Eyes: Negative for pain and redness.   Respiratory: Negative for cough and shortness of breath.    Cardiovascular: Negative for chest pain and palpitations.   Gastrointestinal: Negative for abdominal distention, abdominal pain, blood in stool and nausea.   Endocrine: Negative for polydipsia and polyphagia.   Genitourinary: Negative for dysuria and hematuria.   Musculoskeletal: Negative for gait problem and neck stiffness.   Skin: Negative for rash.   Neurological: Negative for dizziness, weakness and numbness.   Hematological: Negative for adenopathy. Does not bruise/bleed easily.   Psychiatric/Behavioral: Negative for decreased concentration and sleep disturbance.     Objective:     Vital Signs (Most Recent):  Temp: 97 °F (36.1 °C) (10/03/17 1125)  Pulse: 66 (10/03/17 1125)  Resp: 16 (10/03/17 1125)  BP: (!) 104/52 (10/03/17 1125)  SpO2: 100 % (10/03/17 1125) Vital Signs (24h  Range):  Temp:  [97 °F (36.1 °C)] 97 °F (36.1 °C)  Pulse:  [66] 66  Resp:  [16] 16  SpO2:  [100 %] 100 %  BP: (104)/(52) 104/52        There is no height or weight on file to calculate BMI.    Physical Exam   Constitutional: She is oriented to person, place, and time. She appears well-developed and well-nourished. No distress. Cervical collar in place.   HENT:   Head: Normocephalic and atraumatic.   Eyes: Pupils are equal, round, and reactive to light. No scleral icterus.   Neck: Neck supple.   Cardiovascular: Normal rate, regular rhythm and normal heart sounds.    No murmur heard.  Pulmonary/Chest: Effort normal and breath sounds normal.   Abdominal: Soft. Bowel sounds are normal. She exhibits no distension. There is no tenderness.   Musculoskeletal: Normal range of motion. She exhibits no edema or tenderness.   Cast in the right leg   Lymphadenopathy:     She has no cervical adenopathy.   Neurological: She is alert and oriented to person, place, and time. She exhibits normal muscle tone. Coordination normal.   Skin: No rash noted. No erythema.       Significant Labs:   BMP:   Recent Labs  Lab 10/03/17  1500   *      K 3.9      CO2 22*   BUN 13   CREATININE 0.8   CALCIUM 9.8     CBC:   Recent Labs  Lab 10/03/17  1234   WBC 21.04*   HGB 14.9   HCT 43.3   *       Significant Imaging:     Narrative     Findings: Patient was administered 75 cc of Omnipaque 350.    No mediastinal, hilar or axillary adenopathy is seen.  No hematoma is identified.  There may be chronic gallbladder changes.  Liver, spleen, pancreas show nothing unusual.  The pancreatic duct is slightly prominent.  The common bile duct is dilated 1.3 cm.  No adrenal pathology is seen.  Kidneys enhance normally.  No ascites is seen.  Bowel loops are unremarkable.  Pelvic organs show nothing unusual.  Kidneys and ureters are normal.  No ureter obstruction is seen.  Bladder is normal.  There is a severe comminuted compression wedge  burst fracture of L2 with a posterior fragment in the canal causing severe canal stenosis.  No pelvic fracture seen.  There is slight cortical irregularity of the right inferior pubic ramus thought to be developmental.  No pneumothorax or lung contusion seen.   Impression      Unstable severe wedge compression burst fracture of the L2 vertebral body involving the anterior middle column making this unstable.  There is a fragment in the canal.    No pelvic fracture seen.  No organ injury.    Incidental findings include dilatation of the common bile duct.  Correlate with liver function studies this is unlikely to be traumatic.  There are also chronic changes of the gallbladder.  GI consultation may be warranted at a later time.        Electronically signed by: LILI PLATT  Date: 10/03/17  Time: 14:32      Findings: Patient was administered 75 cc of Omnipaque 350.    No mediastinal, hilar or axillary adenopathy is seen.  No hematoma is identified.  There may be chronic gallbladder changes.  Liver, spleen, pancreas show nothing unusual.  The pancreatic duct is slightly prominent.  The common bile duct is dilated 1.3 cm.  No adrenal pathology is seen.  Kidneys enhance normally.  No ascites is seen.  Bowel loops are unremarkable.  Pelvic organs show nothing unusual.  Kidneys and ureters are normal.  No ureter obstruction is seen.  Bladder is normal.  There is a severe comminuted compression wedge burst fracture of L2 with a posterior fragment in the canal causing severe canal stenosis.  No pelvic fracture seen.  There is slight cortical irregularity of the right inferior pubic ramus thought to be developmental.  No pneumothorax or lung contusion seen.   Impression      Unstable severe wedge compression burst fracture of the L2 vertebral body involving the anterior middle column making this unstable.  There is a fragment in the canal.    No pelvic fracture seen.  No organ injury.    Incidental findings include dilatation  of the common bile duct.  Correlate with liver function studies this is unlikely to be traumatic.  There are also chronic changes of the gallbladder.  GI consultation may be warranted at a later time.        Electronically signed by: LILI PLATT  Date: 10/03/17  Time: 14:32      Narrative     CT cervical spine without contrast    Clinical history: Fall    Comparison: None    Technique:  Axial images of the cervical spine were obtained at 2 mm intervals without administration of IV contrast.  Coronal and sagittal reformatted images were reviewed.    Findings:  Degenerative changes are noted of the right temporomandibular joint.  There is opacification of the maxillary sinuses.  There is a mucous attention cyst or polyp within the left sphenoid sinus.    Lateral imaging demonstrates adequate alignment of the cervical spine without significant vertebral body height loss or disc space height loss.  No acute displaced fracture or dislocation of the cervical spine.  No severe spinal canal stenosis or severe neuroforaminal narrowing at any level.  The lung apices are grossly clear.  The paraspinous and hypopharyngeal soft tissues are grossly unremarkable noting minimal subcutaneous edema within the soft tissues posterior to C6-T2.  Mild degenerative changes are noted of the cervical spine.  The airway is patent.  Limited dilation of the intracranial content is grossly unremarkable.   Impression       1.  No acute displaced fracture or dislocation of the cervical spine.    2.  Sinus disease.      Electronically signed by: KEYONA TUCKER MD  Date: 10/03/17  Time: 13:50      Narrative     Comparison: None    Clinical history: Altered metal status    Technique:    Axial images of the brain were obtained at 5-mm intervals from the skull base to the vertex without the administration of contrast.    Findings:    Punctate foci of hypoattenuation involving the left thalamus likely reflects volume averaging, differential would  include age indeterminate lacunar infarct however felt less likely.  There is no evidence of acute major vascular territory infarct, hemorrhage, or mass.  There is no hydrocephalus.  There are no abnormal extra-axial fluid collections.  There are aerated secretions within the bilateral maxillary sinuses and ethmoid sinuses, otherwise the visualized paranasal sinuses and mastoid air cells are grossly clear.  The visualized soft tissues are unremarkable.   Impression         No acute intracranial abnormalities, noting punctate foci of hypoattenuation in the left basal ganglia suggest volume averaging or sequela of motion artifact rather than age-indeterminate infarct however correlation with current symptoms recommended..    Sinus disease.            Electronically signed by: KEYONA TUCKER MD  Date: 10/03/17  Time: 13:43

## 2017-10-03 NOTE — CONSULTS
"[facility]  General Surgery  Consult Note    Inpatient consult to General Surgery  Consult performed by: YULI KAM  Consult ordered by: ROMEO WASHINGTON  Reason for consult: Blunt trauma  Assessment/Recommendations: Unstable L2 fracture with spinal stenosis: to OR tomorrow with NS.  Right navicular fracture: casted by ortho  Pelvic abn on xray not seen on CT    Recommend lovenox 30mg BID once cleared by neurosurgery.  No further imaging from a general surgery standpoint.    Yuli Obi PGY4  [phone]        Subjective:     Chief Complaint/Reason for Admission: blunt trauma    History of Present Illness: 46yo woman fell "30ft" from the tree of life in [facility]. She can hardly stay awake during my interview and can't really remember what was hurting when she came in. No pain currently.     No current facility-administered medications on file prior to encounter.      Current Outpatient Prescriptions on File Prior to Encounter   Medication Sig    clonazePAM (KLONOPIN) 1 MG tablet Take 2 mg by mouth every evening.    dextroamphetamine-amphetamine (AMPHETAMINE SALT COMBO) 30 mg Tab Take by mouth once daily.       Review of patient's allergies indicates:  No Known Allergies    Past Medical History:   Diagnosis Date    ADHD (attention deficit hyperactivity disorder)     Anxiety      Past Surgical History:   Procedure Laterality Date    TUBAL LIGATION  2003     Family History     None        Social History Main Topics    Smoking status: Current Every Day Smoker     Packs/day: 0.50     Types: Cigarettes    Smokeless tobacco: Not on file    Alcohol use No    Drug use:      Types: IV    Sexual activity: Not on file      Comment: Heroin occasionally     Review of Systems   Constitutional: Negative for activity change, appetite change, chills and fever.   HENT: Negative for congestion and trouble swallowing.    Eyes: Negative for visual disturbance.   Respiratory: Negative for cough and " shortness of breath.    Cardiovascular: Negative for chest pain and leg swelling.   Gastrointestinal: Negative for abdominal distention, abdominal pain, constipation, diarrhea, nausea and vomiting.   Endocrine: Negative for cold intolerance and heat intolerance.   Genitourinary: Negative for difficulty urinating and dysuria.   Musculoskeletal: Negative for arthralgias and back pain.   Skin: Negative for rash and wound.   Neurological: Negative for dizziness and headaches.   Psychiatric/Behavioral: Negative for agitation and behavioral problems.     Objective:     Vital Signs (Most Recent):  Temp: 97 °F (36.1 °C) (10/03/17 1125)  Pulse: 66 (10/03/17 1125)  Resp: 16 (10/03/17 1125)  BP: (!) 104/52 (10/03/17 1125)  SpO2: 100 % (10/03/17 1125) Vital Signs (24h Range):  Temp:  [97 °F (36.1 °C)] 97 °F (36.1 °C)  Pulse:  [66] 66  Resp:  [16] 16  SpO2:  [100 %] 100 %  BP: (104)/(52) 104/52        There is no height or weight on file to calculate BMI.    No intake or output data in the 24 hours ending 10/03/17 1651    Physical Exam   Constitutional: She is oriented to person, place, and time. She appears well-developed and well-nourished. No distress.   HENT:   Head: Normocephalic and atraumatic.   Eyes: EOM are normal. Pupils are equal, round, and reactive to light.   Cardiovascular: Normal rate and regular rhythm.  Exam reveals no gallop and no friction rub.    No murmur heard.  Pulmonary/Chest: Effort normal and breath sounds normal. No respiratory distress. She has no wheezes. She has no rales.   Abdominal: Soft. Bowel sounds are normal. She exhibits no distension. There is no tenderness. There is no guarding.   Musculoskeletal: She exhibits no edema.   Neck brace.  Right cast on ankle.   Neurological: She is alert and oriented to person, place, and time. No cranial nerve deficit.   Skin: Skin is warm and dry.   Psychiatric:   Awakes to voice and answers appropriately       Significant Labs:  CBC:   Recent Labs  Lab  10/03/17  1234   WBC 21.04*   RBC 4.99   HGB 14.9   HCT 43.3   *   MCV 87   MCH 29.9   MCHC 34.4     CMP:   Recent Labs  Lab 10/03/17  1500   *   CALCIUM 9.8   ALBUMIN 3.6   PROT 7.6      K 3.9   CO2 22*      BUN 13   CREATININE 0.8   ALKPHOS 111   ALT 21   AST 33   BILITOT 0.5     Lactic Acid: No results for input(s): LACTATE in the last 48 hours.    Significant Diagnostics:  I have reviewed all pertinent imaging results/findings within the past 24 hours.    Assessment/Plan:     Active Diagnoses:    Diagnosis Date Noted POA    PRINCIPAL PROBLEM:  Burst fracture of lumbar vertebra [S32.001A] 10/03/2017 Yes    Closed navicular fracture of right foot [S92.251A] 10/03/2017 Yes    Pre-op evaluation [Z01.818] 10/03/2017 Not Applicable    Closed displaced fracture of navicular bone of right foot [S92.251A] 10/03/2017 Yes      Problems Resolved During this Admission:    Diagnosis Date Noted Date Resolved POA       Thank you for your consult. I will follow-up with patient. Please contact us if you have any additional questions.    Yuli Crocker MD  General Surgery  [facility]

## 2017-10-03 NOTE — CONSULTS
[facility]  Orthopedics  Progress Note    Patient Name: Candy Blakely  MRN: [id number]  Admission Date: 10/3/2017  Hospital Length of Stay: 0 days  Attending Provider: Charlette Kunz MD  Primary Care Provider: Primary Doctor No  Follow-up For: Procedure(s) (LRB):  DIRECT LATERAL INTERBODY FUSION/Lateral L2 corpectomy with posterior L1-3 fusion (N/A)    Post-Operative Day:    Subjective:     Principal Problem:Burst fracture of lumbar vertebra    Principal Orthopedic Problem: navicular fracture and possible right sided inferior pubic ramus fracture    HPI: Candy Blakely is a 46 yo female with history of heroine use (per chart review) presenting with low back pain and right foot pain after fall from tree. Patient was climbing a tree in [facility], had her foot slip and fell from substantial height onto her low back. Patient states a friend saw her and thought the fall was about 30 feet. Denies numbness, tingling, or paresthesias to upper extremities, lower extremity or groin. Denies bowel or bladder incontinence. Denies other MSK pains. Denies head injury or LOC.    ROS: denies chest pain, SOB, nausea, vomiting, for full ROS please see H&P    Review of patient's allergies indicates:  No Known Allergies    No current facility-administered medications for this encounter.      Current Outpatient Prescriptions   Medication Sig    clonazePAM (KLONOPIN) 1 MG tablet Take 2 mg by mouth every evening.    dextroamphetamine-amphetamine (AMPHETAMINE SALT COMBO) 30 mg Tab Take by mouth once daily.     Objective:     Vital Signs (Most Recent):  Temp: 97 °F (36.1 °C) (10/03/17 1125)  Pulse: 66 (10/03/17 1125)  Resp: 16 (10/03/17 1125)  BP: (!) 104/52 (10/03/17 1125)  SpO2: 100 % (10/03/17 1125) Vital Signs (24h Range):  Temp:  [97 °F (36.1 °C)] 97 °F (36.1 °C)  Pulse:  [66] 66  Resp:  [16] 16  SpO2:  [100 %] 100 %  BP: (104)/(52) 104/52           There is no height or weight on file to calculate  BMI.    No intake or output data in the 24 hours ending 10/03/17 1529    Ortho/SPM Exam   Vitals: Afebrile.  Vital signs stable.  General: No acute distress.  HEENT: Normocephalic. Atraumatic. Sclera anicteric. No tracheal deviation.  Chest: No increased work of breathing.  Abdominal: Nondistended.  Extremities: No cyanosis.  No clubbing.  No edema.  Palpable pulses.  Skin: No generalized rash.  Neuro: Awake. Alert. Oriented to person, place, time, and situation.  Psych: Normal appearance. Cooperative.  Appropriate mood.  Appropriate affect.      Pelvis: no tenderness to palpation throughout, no pain with lateral compression through iliac wings or greater trochanters    RLE:  Skin intact throughout  Tenderness to palpation over dorsum of foot  Pain with ROM of ankle secondary to dorsal foot swelling  Painless ROM of knee and hip  Moderate swelling to dorsum of foot, compartments soft  Brisk capillary refill intact distally  2+ DP pulse  SILT throughout    Painless ROM and no tenderness to palpation over bilateral clavicles, shoulders, elbows, wrists. Painless ROM and no tenderness to palpation of left hip, knee, or foot.    Significant Labs: All pertinent labs within the past 24 hours have been reviewed.    Significant Imaging:     X-Ray right foot/ankle: I have reviewed all pertinent results/findings and my personal findings are:  navicular fracture with comminution and displacement of fragments medially;     CT right ankle: navicular fracture with comminution and displacement of fragments medial, and comminuted and minimally displaced calcaneus fracture through sustentaculum javy    X-ray pelvis:  cortical irregularity of right inferior pubic ramus  CT pelvis reviewed: no acute fracture of bilateral inferior or superior pubic rami noted, no sacral fracture noted    Lumbar spine films reviewed: L2 burst fracture, L5-S1 grade 1-2 spondylolisthesis of unknown chronicity, transitional anatomy noted with lumbarization  of S1    Assessment/Plan:     Closed navicular fracture of right foot    Candy Blakely is a 46 yo female with right navicular fracture, right calcaneus fracture and cortical irregularities of right inferior pubic ramus  - Placed in right bulky Cooper splint  - NWB RLE  - No acute orthopedic intervention  - No visible pelvis fracture noted on CT, WBAT through pelvis  - For management of L2 fracture, please see neurosurgical note  - Recommend PT/OT when stable from neurosurgical standpoint  - Recommend consultation to general surgery for trauma evaluation  - Discussed follow-up options including here and [facility]. Patient elects to follow-up at [facility].              Edward Marquez MD  Orthopedics  [facility]

## 2017-10-03 NOTE — ASSESSMENT & PLAN NOTE
-- no Hx of heart or lung active diseases.   -- confirmed with pharmacy the prescription of Klonopin 2mg TID but patient using it as 2 mg QHS PRN. Last time was 3 night ago.   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  -- EKG at the bedside show NSR with QTc 486   -- revised dusty score is 0 and that put her at very low risk (0.4% for major complication )     Plan:   -- please add Klonpin 2 mg nightly as patient had Hx of withdrawal seizure.   -- follow WBC as it likely reactive.   -- urine Tox.

## 2017-10-03 NOTE — ED PROVIDER NOTES
"Encounter Date: 10/3/2017       History     Chief Complaint   Patient presents with    Fall     patient states she "fell out of the tree of life" while trying to climb up; denies LOC     Patient is a 47 year old female with no significant PMHx. Hx of substance abuse according to EMR. She presents to the ED via personal vehicle for Fall. She reports falling from approximately 30 feet from a tree in [facility]. Denies LOC. Denies head trauma. Reports falling directly on back. She denies fever, chills, nausea, vomiting, sob, chest pain, abd pain, dysuria, diarrhea, or constipation. She is a current every day smoker, 0.5 pdd, denies alcohol use, and denies recreational drug use.             Review of patient's allergies indicates:  No Known Allergies  Past Medical History:   Diagnosis Date    ADHD (attention deficit hyperactivity disorder)     Anxiety      Past Surgical History:   Procedure Laterality Date    TUBAL LIGATION  2003     History reviewed. No pertinent family history.  Social History   Substance Use Topics    Smoking status: Current Every Day Smoker     Packs/day: 0.50     Types: Cigarettes    Smokeless tobacco: Not on file    Alcohol use No     Review of Systems   Constitutional: Negative for fever.   HENT: Negative for sore throat.    Respiratory: Negative for shortness of breath.    Cardiovascular: Negative for chest pain.   Gastrointestinal: Negative for nausea.   Genitourinary: Negative for dysuria.   Musculoskeletal: Positive for back pain.        Fall   Skin: Negative for rash.   Neurological: Negative for weakness.   Hematological: Does not bruise/bleed easily.       Physical Exam     Initial Vitals [10/03/17 1125]   BP Pulse Resp Temp SpO2   (!) 104/52 66 16 97 °F (36.1 °C) 100 %      MAP       69.33         Physical Exam    Vitals reviewed.  Constitutional: She appears well-developed and well-nourished. She is not diaphoretic. No distress.   Patient resting uncomfortably due to pain in NAD " on RA.   HENT:   Head: Normocephalic and atraumatic.   Nose: Nose normal.   Eyes: Conjunctivae and EOM are normal. Pupils are equal, round, and reactive to light.   Neck: Normal range of motion.   Cardiovascular: Normal rate and regular rhythm. Exam reveals no friction rub.    No murmur heard.  Pulses:       Dorsalis pedis pulses are 2+ on the right side, and 3+ on the left side.   Right dorsalis pedal pulse obtained with doppler.    Pulmonary/Chest: Breath sounds normal. No respiratory distress. She has no wheezes. She has no rales.   Abdominal: Soft. Bowel sounds are normal. She exhibits no distension. There is no tenderness. There is no rebound.   Musculoskeletal: Normal range of motion.   No midline spinal tenderness.   Ecchymosis over lateral aspect of right ankle/foot with swelling.    Neurological: She is alert and oriented to person, place, and time. She has normal strength. No sensory deficit.   5/5 motor strength of b/l UE and LE.    Skin: Skin is warm and dry. No erythema.   Psychiatric: She has a normal mood and affect. Thought content normal.         ED Course   Critical Care  Date/Time: 10/3/2017 2:29 PM  Performed by: ROMEO WASHINGTON  Authorized by: ROMEO WASHINGTON   Total critical care time (exclusive of procedural time) : 45 minutes  Critical care time was exclusive of separately billable procedures and treating other patients and teaching time.  Critical care was necessary to treat or prevent imminent or life-threatening deterioration of the following conditions: trauma.  Critical care was time spent personally by me on the following activities: development of treatment plan with patient or surrogate, discussions with consultants, evaluation of patient's response to treatment, examination of patient, obtaining history from patient or surrogate, ordering and performing treatments and interventions, ordering and review of radiographic studies, ordering and review of laboratory studies, pulse  oximetry, re-evaluation of patient's condition and review of old charts.        Labs Reviewed   CBC W/ AUTO DIFFERENTIAL - Abnormal; Notable for the following:        Result Value    WBC 21.04 (*)     Platelets 396 (*)     Gran # 17.9 (*)     Mono # 1.2 (*)     Gran% 85.0 (*)     Lymph% 8.6 (*)     All other components within normal limits   COMPREHENSIVE METABOLIC PANEL - Abnormal; Notable for the following:     CO2 22 (*)     Glucose 116 (*)     All other components within normal limits   ALCOHOL,MEDICAL (ETHANOL)   DRUG SCREEN PANEL, URINE EMERGENCY   APTT   PROTIME-INR   POCT URINE PREGNANCY   TYPE & SCREEN        Imaging Results          CT Abdomen Pelvis With Contrast (Final result)  Result time 10/03/17 14:32:49    Final result by Godwin Platt III, MD (10/03/17 14:32:49)                 Impression:     Unstable severe wedge compression burst fracture of the L2 vertebral body involving the anterior middle column making this unstable.  There is a fragment in the canal.    No pelvic fracture seen.  No organ injury.    Incidental findings include dilatation of the common bile duct.  Correlate with liver function studies this is unlikely to be traumatic.  There are also chronic changes of the gallbladder.  GI consultation may be warranted at a later time.        Electronically signed by: GODWIN PLATT  Date:     10/03/17  Time:    14:32              Narrative:    Findings: Patient was administered 75 cc of Omnipaque 350.    No mediastinal, hilar or axillary adenopathy is seen.  No hematoma is identified.  There may be chronic gallbladder changes.  Liver, spleen, pancreas show nothing unusual.  The pancreatic duct is slightly prominent.  The common bile duct is dilated 1.3 cm.  No adrenal pathology is seen.  Kidneys enhance normally.  No ascites is seen.  Bowel loops are unremarkable.  Pelvic organs show nothing unusual.  Kidneys and ureters are normal.  No ureter obstruction is seen.  Bladder is normal.  There  is a severe comminuted compression wedge burst fracture of L2 with a posterior fragment in the canal causing severe canal stenosis.  No pelvic fracture seen.  There is slight cortical irregularity of the right inferior pubic ramus thought to be developmental.  No pneumothorax or lung contusion seen.                             CT Chest With Contrast (Final result)  Result time 10/03/17 14:32:48    Final result by Godwin Platt III, MD (10/03/17 14:32:48)                 Impression:     Unstable severe wedge compression burst fracture of the L2 vertebral body involving the anterior middle column making this unstable.  There is a fragment in the canal.    No pelvic fracture seen.  No organ injury.    Incidental findings include dilatation of the common bile duct.  Correlate with liver function studies this is unlikely to be traumatic.  There are also chronic changes of the gallbladder.  GI consultation may be warranted at a later time.        Electronically signed by: GODWIN PLATT  Date:     10/03/17  Time:    14:32              Narrative:    Findings: Patient was administered 75 cc of Omnipaque 350.    No mediastinal, hilar or axillary adenopathy is seen.  No hematoma is identified.  There may be chronic gallbladder changes.  Liver, spleen, pancreas show nothing unusual.  The pancreatic duct is slightly prominent.  The common bile duct is dilated 1.3 cm.  No adrenal pathology is seen.  Kidneys enhance normally.  No ascites is seen.  Bowel loops are unremarkable.  Pelvic organs show nothing unusual.  Kidneys and ureters are normal.  No ureter obstruction is seen.  Bladder is normal.  There is a severe comminuted compression wedge burst fracture of L2 with a posterior fragment in the canal causing severe canal stenosis.  No pelvic fracture seen.  There is slight cortical irregularity of the right inferior pubic ramus thought to be developmental.  No pneumothorax or lung contusion seen.                              CT Lumbar Spine Without Contrast (Preliminary result)  Result time 10/03/17 15:28:02    Preliminary result by Tyron Rhoades Jr., MD (10/03/17 15:28:02)                 Impression:       1. Acute compression deformity of the L2 vertebral body with retropulsion resulting in severe spinal canal stenosis.    2. Remaining lumbar spine demonstrates mild spondylosis without significant spinal canal stenosis or neuroforaminal narrowing.    3.  Grade I anterolisthesis of L5 on S1.      Preliminary report discussed with Dr. WASHINGTON by Dr. Rivas at 14:01:02 on Tuesday, October 03, 2017.  ______________________________________     Electronically signed by resident: JUDITH RIVAS MD  Date:     10/03/17  Time:    14:05            As the supervising and teaching physician, I personally reviewed the images and resident's interpretation and I agree with the findings.               Narrative:    Procedure Comment: 2.5-mm axial images of the lumbar spine were obtained without the administration of intravenous contrast material.  Coronal and sagittal reformatted images were reviewed.    Results:  The lumbar vertebral bodies demonstrate grade I anterolisthesis of L5 on S1.Acute burst fracture with extension into the left pedicle of the L2 vertebral body.  There is associated retropulsion of the posterior vertebral wall of approximately 9 mm into the spinal canal resulting in significant spinal canal stenosis of approximately 6 mm.    Mild multilevel lumbar spondylosis of the remaining visualized lumbar spine with facet arthropathy most prominent at L5/S1 without significant spinal canal stenosis or neuroforaminal narrowing.    The remaining visualized paravertebral structures demonstrate right adnexal hypodensities, likely ovarian cysts.                  Preliminary result by Tyron Rhoades Jr., MD (10/03/17 14:05:25)                 Impression:       1. Acute compression deformity of the L2 vertebral body with retropulsion resulting in  severe spinal canal stenosis.    2. Remaining lumbar spine demonstrates mild spondylosis without significant spinal canal stenosis or neuroforaminal narrowing.    3.  Grade I anterolisthesis of L5 on S1.      Preliminary report discussed with Dr. WASHINGTON by Dr. Rivas at 14:01:02 on Tuesday, October 03, 2017.  ______________________________________     Electronically signed by resident: JUDITH RIVAS MD  Date:     10/03/17  Time:    14:05            As the supervising and teaching physician, I personally reviewed the images and resident's interpretation and I agree with the findings.               Narrative:    Procedure Comment: 2.5-mm axial images of the lumbar spine were obtained without the administration of intravenous contrast material.  Coronal and sagittal reformatted images were reviewed.    Results:  The lumbar vertebral bodies demonstrate grade I anterolisthesis of L5 on S1.Acute burst fracture with extension into the left pedicle of the L2 vertebral body.  There is associated retropulsion of the posterior vertebral wall of approximately 9 mm into the spinal canal resulting in significant spinal canal stenosis of approximately 6 mm.    Mild multilevel lumbar spondylosis of the remaining visualized lumbar spine with facet arthropathy most prominent at L5/S1 without significant spinal canal stenosis or neuroforaminal narrowing.    The remaining visualized paravertebral structures demonstrate right adnexal hypodensities, likely ovarian cysts.                             CT Head Without Contrast (Final result)  Result time 10/03/17 13:43:27    Final result by Wilfredo Roman MD (10/03/17 13:43:27)                 Impression:        No acute intracranial abnormalities, noting punctate foci of hypoattenuation in the left basal ganglia suggest volume averaging or sequela of motion artifact rather than age-indeterminate infarct however correlation with current symptoms recommended..    Sinus  disease.            Electronically signed by: KEYONA ROMAN MD  Date:     10/03/17  Time:    13:43              Narrative:    Comparison: None    Clinical history: Altered metal status    Technique:    Axial images of the brain were obtained at 5-mm intervals from the skull base to the vertex without the administration of contrast.    Findings:    Punctate foci of hypoattenuation involving the left thalamus likely reflects volume averaging, differential would include age indeterminate lacunar infarct however felt less likely.  There is no evidence of acute major vascular territory infarct, hemorrhage, or mass.  There is no hydrocephalus.  There are no abnormal extra-axial fluid collections.  There are aerated secretions within the bilateral maxillary sinuses and ethmoid sinuses, otherwise the visualized paranasal sinuses and mastoid air cells are grossly clear.  The visualized soft tissues are unremarkable.                             CT Cervical Spine Without Contrast (Final result)  Result time 10/03/17 13:50:11    Final result by Keyona Roman MD (10/03/17 13:50:11)                 Impression:      1.  No acute displaced fracture or dislocation of the cervical spine.    2.  Sinus disease.      Electronically signed by: KEYONA ROMAN MD  Date:     10/03/17  Time:    13:50              Narrative:    CT cervical spine without contrast    Clinical history: Fall    Comparison: None    Technique:  Axial images of the cervical spine were obtained at 2 mm intervals without administration of IV contrast.  Coronal and sagittal reformatted images were reviewed.    Findings:  Degenerative changes are noted of the right temporomandibular joint.  There is opacification of the maxillary sinuses.  There is a mucous attention cyst or polyp within the left sphenoid sinus.    Lateral imaging demonstrates adequate alignment of the cervical spine without significant vertebral body height loss or disc space height loss.  No  acute displaced fracture or dislocation of the cervical spine.  No severe spinal canal stenosis or severe neuroforaminal narrowing at any level.  The lung apices are grossly clear.  The paraspinous and hypopharyngeal soft tissues are grossly unremarkable noting minimal subcutaneous edema within the soft tissues posterior to C6-T2.  Mild degenerative changes are noted of the cervical spine.  The airway is patent.  Limited dilation of the intracranial content is grossly unremarkable.                             X-Ray Ankle Complete Right (Final result)  Result time 10/03/17 13:11:44    Final result by Keyona Roman MD (10/03/17 13:11:44)                 Impression:      1.  No acute displaced fracture or dislocation of the ankle, please see separately dictated report for details of the foot.      Electronically signed by: KEYONA ROMAN MD  Date:     10/03/17  Time:    13:11              Narrative:    Ankle complete 3 view right    History: Pain    Comparison: None    Findings:  3 views.    No acute displaced fracture or dislocation of the ankle.  The ankle mortise is intact.  There is edema about the anterior aspect of the proximal foot, please see separately dictated foot radiograph for details of fracture.  No radiopaque foreign body.                             X-Ray Foot Complete Right (Final result)  Result time 10/03/17 13:14:36    Final result by Keyona Roman MD (10/03/17 13:14:36)                 Impression:      1.  Acute comminuted fracture of the lateral aspect of the navicular, with overlying edema.  Fracture planes involving articular surfaces.  No isiah dislocation.  Please see above.      Electronically signed by: KEYONA ROMAN MD  Date:     10/03/17  Time:    13:14              Narrative:    Foot complete 3 view right    Clinical history: Pain    Comparison: None    Findings:  3 views.    There is an acute comminuted fracture of the navicular with associated overlying edema.  Fracture  planes appear to involve the adjacent articular surfaces.  The Lisfranc articulations appear preserved.  No radiopaque foreign body.                             X-Ray Lumbar Spine Ap And Lateral (Final result)  Result time 10/03/17 13:16:49    Final result by Keyona Roman MD (10/03/17 13:16:49)                 Impression:      1.  Acute appearing compression deformity of L2, resulting in at least 50% height loss centrally.    2.  Grade 1-2 anterolisthesis of L5 on S1, possibly on the bases of pars defect however in the setting of trauma, correlation is advised.      Electronically signed by: KEYONA ROMAN MD  Date:     10/03/17  Time:    13:16              Narrative:    Lumbar spine AP and lateral    Clinical history: Fall    Comparison: None    Findings:  2 views.    There is an acute appearing compression deformity of the L2 vertebral body.  There is grade 1-2 anterolisthesis of L5 on S1, possibly on the basis of pars defect.  There is lower lumbar facet arthropathy, the facet joints are otherwise aligned allowing for L5 malalignment.  The bilateral sacroiliac joints appear grossly intact although suboptimally imaged.  Pubic symphysis is intact.                             X-Ray Pelvis Routine AP (Final result)  Result time 10/03/17 13:18:52    Final result by Keyona Roman MD (10/03/17 13:18:52)                 Impression:      1.  Irregularity involving the right inferior pubic ramus concerning for fracture, correlation advised.  Please see above.      Electronically signed by: KEYONA ROMAN MD  Date:     10/03/17  Time:    13:18              Narrative:    Pelvis routine AP     Clinical history: Unspecified fall    Comparison: None    Findings:  Single view.    The bilateral femoral heads maintain anatomic relationship with their respective acetabula.  The pubic symphysis is intact.  The bilateral sacroiliac joints appear grossly intact.  There is cortical irregularity involving the right inferior  pubic ramus, some of which appears well-corticated, possibly subacute injury however correlation advised as acute fracture cannot be excluded in the setting.  The bowel gas pattern is nonobstructive noting possible constipation.                                       APC / Resident Notes:   Patient is a 47 year old female with no significant PMHx. She presents to the ED for Fall. Patient is in no acute distress. Vitals stable. AAOx3. RRR. Lungs CTA. Abdomen is soft, non tender, non distended. Skin is normal appearance without rashes. No midline spinal tenderness.   Ecchymosis over lateral aspect of right ankle/foot with swelling. 5/5 motor strength of b/l UE and LE. No sensory deficit.     Will order labs, imaging, and pain medication.     Differential diagnoses include, but are not limited to: SDH, ICH, substance abuse/intoxication, fracture, dislocation, and contusion.     On xray/CT imaging patient found to have:   1. Acute burst fracture with extension into the left pedicle of the L2 vertebral body.  There is associated retropulsion of the posterior vertebral wall of approximately 9 mm into the spinal canal resulting in significant spinal canal stenosis of approximately 6 mm. Grade I anterolisthesis of L5 on S1.  2. No acute intracranial abnormalities.   3. No acute displaced fracture or dislocation of the cervical spine.  4. Acute comminuted fracture of the lateral aspect of the navicular, with overlying edema.    Have consulted neurosurgery, patient is immobilized with C-Collar in place and instructed not to move. Awaiting further recommendations.     Have consulted orthopedics, awaiting recommendations. Ortho at bedside, splinting right lower extremity.     Neurosurgery will admit patient to their service for further management.     I have discussed and reviewed with my supervising physician.             Attending Attestation:     Physician Attestation Statement for NP/PA:   I have conducted a face to face  encounter with this patient in addition to the NP/PA, due to Medical Complexity    Other NP/PA Attestation Additions:    History of Present Illness: 46 yo f, h/o drug abuse (based on review of EMR) though pt denies using an substances today, was climbing tree in [employer] and had witnessed fall, ? 30 ft.  Pt reports landing on buttocks/lower back.  Denies head trauma/LOC/neck pain.  On exam, pt seems to have substances on board/intox.  No signs head trauma.  No c/t/l spine tenderness.  Poorly localized buttocks/pelvic pain.  Obvious R foot deformity.  Initial xrays done: pelvic/L spine/foot show comminuted navicular fx/significant L2 compression fx/inf pubic ramus fx.  CT head/Cspine ordered and still pending.  Given these abnormalities, plan now to get CT abd/pelvis and L-spine CT to assess for other injuries.  Ortho consulted and will likely need to get trauma involved, depending on what is found on more extensive imaging                   ED Course      2:27 PM  CT L spine with significant L2 compression fx, bone fragments in spinal canal  Pt already immobilized  LE neuro exam rechecked and still with normal strength and sensation  NSG at bedside evaluating pt - would like MRI L spine  General surgery/trauma service consulted given severity of trauma, to evaluate pt  Ortho resident at bedside managing navicular fx  Anticipate likely admit to NSG service    Clinical Impression:   The primary encounter diagnosis was Closed nondisplaced fracture of navicular bone of right foot, initial encounter. Diagnoses of Ankle pain, Fall, Altered mental status, Traumatic compression fracture of L2 lumbar vertebra, closed, initial encounter, Lumbar vertebral fracture, and Preop cardiovascular exam were also pertinent to this visit.                           Lety Posadas PA-C  10/03/17 1559       Lety Posadas PA-C  10/03/17 1557

## 2017-10-03 NOTE — SUBJECTIVE & OBJECTIVE
(Not in a hospital admission)    Review of patient's allergies indicates:  No Known Allergies    Past Medical History:   Diagnosis Date    ADHD (attention deficit hyperactivity disorder)     Anxiety      Past Surgical History:   Procedure Laterality Date    TUBAL LIGATION  2003     Family History     None        Social History Main Topics    Smoking status: Current Every Day Smoker     Packs/day: 0.50     Types: Cigarettes    Smokeless tobacco: Not on file    Alcohol use No    Drug use:      Types: IV    Sexual activity: Not on file      Comment: Heroin occasionally     Review of Systems   Constitutional: Negative for chills and fever.   HENT: Negative for facial swelling, trouble swallowing and voice change.    Eyes: Negative for photophobia and discharge.   Respiratory: Negative for shortness of breath and wheezing.    Cardiovascular: Negative for chest pain and palpitations.   Gastrointestinal: Negative for abdominal distention and abdominal pain.        No bowel incontinence   Genitourinary: Negative for difficulty urinating and dysuria.   Musculoskeletal: Positive for back pain and myalgias.   Neurological: Negative for speech difficulty and numbness.     Objective:        There is no height or weight on file to calculate BMI.  Vital Signs (Most Recent):  Temp: 97 °F (36.1 °C) (10/03/17 1125)  Pulse: 66 (10/03/17 1125)  Resp: 16 (10/03/17 1125)  BP: (!) 104/52 (10/03/17 1125)  SpO2: 100 % (10/03/17 1125) Vital Signs (24h Range):  Temp:  [97 °F (36.1 °C)] 97 °F (36.1 °C)  Pulse:  [66] 66  Resp:  [16] 16  SpO2:  [100 %] 100 %  BP: (104)/(52) 104/52                           Neurosurgery Physical Exam  General: well developed, well nourished, no distress.   Head: normocephalic, atraumatic  Neurologic: Alert and oriented. Thought content appropriate.  GCS: Motor: 6/Verbal: 5/Eyes: 4 GCS Total: 15  Mental Status: Awake, Alert, Oriented x 4  Language: No aphasia  Speech: No dysarthria  Cranial nerves: face  symmetric, tongue midline, CN II-XII grossly intact.   Eyes: pupils equal, round, reactive to light with accomodation, EOMI.  Pulmonary: normal respirations, no signs of respiratory distress  Abdomen: soft, non-distended, not tender to palpation  Sensory: intact to light touch throughout    Motor Strength: Moves all extremities spontaneously with good tone. No abnormal movements seen.     Strength  Deltoids Triceps Biceps Wrist Extension Wrist Flexion Hand    Upper: R 5/5 5/5 5/5 5/5 5/5 5/5    L 5/5 5/5 5/5 5/5 5/5 5/5     Iliopsoas Quadriceps Knee  Flexion Tibialis  anterior Gastro- cnemius EHL   Lower: R 3/5 4-/5 4/5 5/5 N/A N/A    L 3/5 4-/5 4/5 5/5 5/5 5/5     DTR's: 2+ and symmetric in UE; 3 + and symmetric in LE, unable to assess R achilles 2/2 splinting  Pronator Drift: no drift noted  Finger-to-nose: Intact bilaterally  Lord: absent  Clonus: absent on left, unable to assess on right due to R splint  Babinski: absent on left, unable to assess on right due to R splint  Pulses: 2+ and symmetric radial and dorsalis pedis.  Skin: Skin is warm, dry and intact.    Significant Labs:    Recent Labs  Lab 10/03/17  1500   *      K 3.9      CO2 22*   BUN 13   CREATININE 0.8   CALCIUM 9.8       Recent Labs  Lab 10/03/17  1234   WBC 21.04*   HGB 14.9   HCT 43.3   *     No results for input(s): LABPT, INR, APTT in the last 48 hours.  Microbiology Results (last 7 days)     ** No results found for the last 168 hours. **        Significant Diagnostics:    Xray lumbar spine reviewed and shows severe compression deformity of L2.  CT cervical spine reviewed and shows no evidence for acute fracture.  CT lumbar spine reviewed and shows burst fracture of L2 with retropulsion resulting in severe central stenosis. There is grade I anterolisthesis of L5 on S1.  MRI lumbar spine pending.  CT c/a/p reviewed and shows no compression deformity in the thoracic spine.

## 2017-10-04 ENCOUNTER — ANESTHESIA (OUTPATIENT)
Dept: SURGERY | Facility: HOSPITAL | Age: 47
DRG: 459 | End: 2017-10-04
Payer: MEDICAID

## 2017-10-04 PROBLEM — S92.251A CLOSED DISPLACED FRACTURE OF NAVICULAR BONE OF RIGHT FOOT: Status: ACTIVE | Noted: 2017-10-04

## 2017-10-04 PROBLEM — T14.90XA TRAUMA: Status: ACTIVE | Noted: 2017-10-04

## 2017-10-04 LAB
AMPHET+METHAMPHET UR QL: NORMAL
ANION GAP SERPL CALC-SCNC: 7 MMOL/L
B-HCG UR QL: NEGATIVE
BARBITURATES UR QL SCN>200 NG/ML: NEGATIVE
BASOPHILS # BLD AUTO: 0.01 K/UL
BASOPHILS # BLD AUTO: 0.02 K/UL
BASOPHILS NFR BLD: 0.1 %
BASOPHILS NFR BLD: 0.2 %
BENZODIAZ UR QL SCN>200 NG/ML: NORMAL
BLD PROD TYP BPU: NORMAL
BLD PROD TYP BPU: NORMAL
BLOOD UNIT EXPIRATION DATE: NORMAL
BLOOD UNIT EXPIRATION DATE: NORMAL
BLOOD UNIT TYPE CODE: 5100
BLOOD UNIT TYPE CODE: 5100
BLOOD UNIT TYPE: NORMAL
BLOOD UNIT TYPE: NORMAL
BUN SERPL-MCNC: 14 MG/DL
BZE UR QL SCN: NEGATIVE
CALCIUM SERPL-MCNC: 9.2 MG/DL
CANNABINOIDS UR QL SCN: NEGATIVE
CHLORIDE SERPL-SCNC: 103 MMOL/L
CO2 SERPL-SCNC: 27 MMOL/L
CODING SYSTEM: NORMAL
CODING SYSTEM: NORMAL
CREAT SERPL-MCNC: 0.8 MG/DL
CREAT UR-MCNC: 252 MG/DL
DIFFERENTIAL METHOD: ABNORMAL
DIFFERENTIAL METHOD: ABNORMAL
DISPENSE STATUS: NORMAL
DISPENSE STATUS: NORMAL
EOSINOPHIL # BLD AUTO: 0 K/UL
EOSINOPHIL # BLD AUTO: 0.1 K/UL
EOSINOPHIL NFR BLD: 0.2 %
EOSINOPHIL NFR BLD: 0.7 %
ERYTHROCYTE [DISTWIDTH] IN BLOOD BY AUTOMATED COUNT: 13.7 %
ERYTHROCYTE [DISTWIDTH] IN BLOOD BY AUTOMATED COUNT: 14.1 %
EST. GFR  (AFRICAN AMERICAN): >60 ML/MIN/1.73 M^2
EST. GFR  (NON AFRICAN AMERICAN): >60 ML/MIN/1.73 M^2
GLUCOSE SERPL-MCNC: 105 MG/DL
GLUCOSE SERPL-MCNC: 86 MG/DL (ref 70–110)
GLUCOSE SERPL-MCNC: 87 MG/DL (ref 70–110)
HCO3 UR-SCNC: 20.2 MMOL/L (ref 24–28)
HCO3 UR-SCNC: 21.9 MMOL/L (ref 24–28)
HCT VFR BLD AUTO: 37.1 %
HCT VFR BLD AUTO: 37.3 %
HCT VFR BLD CALC: 26 %PCV (ref 36–54)
HCT VFR BLD CALC: 26 %PCV (ref 36–54)
HGB BLD-MCNC: 12.5 G/DL
HGB BLD-MCNC: 12.5 G/DL
LYMPHOCYTES # BLD AUTO: 1.6 K/UL
LYMPHOCYTES # BLD AUTO: 2 K/UL
LYMPHOCYTES NFR BLD: 12.5 %
LYMPHOCYTES NFR BLD: 19.3 %
MCH RBC QN AUTO: 29.3 PG
MCH RBC QN AUTO: 30.1 PG
MCHC RBC AUTO-ENTMCNC: 33.5 G/DL
MCHC RBC AUTO-ENTMCNC: 33.7 G/DL
MCV RBC AUTO: 87 FL
MCV RBC AUTO: 89 FL
METHADONE UR QL SCN>300 NG/ML: NEGATIVE
MONOCYTES # BLD AUTO: 0.7 K/UL
MONOCYTES # BLD AUTO: 1 K/UL
MONOCYTES NFR BLD: 6 %
MONOCYTES NFR BLD: 9.5 %
NEUTROPHILS # BLD AUTO: 10 K/UL
NEUTROPHILS # BLD AUTO: 7.1 K/UL
NEUTROPHILS NFR BLD: 69.9 %
NEUTROPHILS NFR BLD: 80.9 %
OPIATES UR QL SCN: NORMAL
PCO2 BLDA: 31.2 MMHG (ref 35–45)
PCO2 BLDA: 44.8 MMHG (ref 35–45)
PCP UR QL SCN>25 NG/ML: NEGATIVE
PH SMN: 7.3 [PH] (ref 7.35–7.45)
PH SMN: 7.42 [PH] (ref 7.35–7.45)
PLATELET # BLD AUTO: 268 K/UL
PLATELET # BLD AUTO: 374 K/UL
PMV BLD AUTO: 9 FL
PMV BLD AUTO: 9.5 FL
PO2 BLDA: 138 MMHG (ref 80–100)
PO2 BLDA: 75 MMHG (ref 80–100)
POC BE: -4 MMOL/L
POC BE: -5 MMOL/L
POC IONIZED CALCIUM: 0.96 MMOL/L (ref 1.06–1.42)
POC IONIZED CALCIUM: 1.16 MMOL/L (ref 1.06–1.42)
POC SATURATED O2: 93 % (ref 95–100)
POC SATURATED O2: 99 % (ref 95–100)
POC TCO2: 21 MMOL/L (ref 23–27)
POC TCO2: 23 MMOL/L (ref 23–27)
POTASSIUM BLD-SCNC: 3.1 MMOL/L (ref 3.5–5.1)
POTASSIUM BLD-SCNC: 3.4 MMOL/L (ref 3.5–5.1)
POTASSIUM SERPL-SCNC: 4 MMOL/L
RBC # BLD AUTO: 4.15 M/UL
RBC # BLD AUTO: 4.27 M/UL
SAMPLE: ABNORMAL
SAMPLE: ABNORMAL
SODIUM BLD-SCNC: 138 MMOL/L (ref 136–145)
SODIUM BLD-SCNC: 139 MMOL/L (ref 136–145)
SODIUM SERPL-SCNC: 137 MMOL/L
TOXICOLOGY INFORMATION: NORMAL
TRANS ERYTHROCYTES VOL PATIENT: NORMAL ML
TRANS ERYTHROCYTES VOL PATIENT: NORMAL ML
WBC # BLD AUTO: 10.11 K/UL
WBC # BLD AUTO: 12.38 K/UL

## 2017-10-04 PROCEDURE — 36415 COLL VENOUS BLD VENIPUNCTURE: CPT

## 2017-10-04 PROCEDURE — 99024 POSTOP FOLLOW-UP VISIT: CPT | Mod: ,,, | Performed by: PHYSICIAN ASSISTANT

## 2017-10-04 PROCEDURE — 25000003 PHARM REV CODE 250: Performed by: PHYSICIAN ASSISTANT

## 2017-10-04 PROCEDURE — D9220A PRA ANESTHESIA: Mod: CRNA,,, | Performed by: NURSE ANESTHETIST, CERTIFIED REGISTERED

## 2017-10-04 PROCEDURE — 0SB20ZZ EXCISION OF LUMBAR VERTEBRAL DISC, OPEN APPROACH: ICD-10-PCS | Performed by: NEUROLOGICAL SURGERY

## 2017-10-04 PROCEDURE — 27800903 OPTIME MED/SURG SUP & DEVICES OTHER IMPLANTS: Performed by: NEUROLOGICAL SURGERY

## 2017-10-04 PROCEDURE — 81025 URINE PREGNANCY TEST: CPT

## 2017-10-04 PROCEDURE — 80048 BASIC METABOLIC PNL TOTAL CA: CPT

## 2017-10-04 PROCEDURE — 63090 REMOVE VERT BODY DCMPRN LMBR: CPT | Mod: ,,, | Performed by: NEUROLOGICAL SURGERY

## 2017-10-04 PROCEDURE — D9220A PRA ANESTHESIA: Mod: ANES,,, | Performed by: ANESTHESIOLOGY

## 2017-10-04 PROCEDURE — P9045 ALBUMIN (HUMAN), 5%, 250 ML: HCPCS | Performed by: NURSE ANESTHETIST, CERTIFIED REGISTERED

## 2017-10-04 PROCEDURE — 36556 INSERT NON-TUNNEL CV CATH: CPT | Mod: 59,,, | Performed by: ANESTHESIOLOGY

## 2017-10-04 PROCEDURE — P9021 RED BLOOD CELLS UNIT: HCPCS

## 2017-10-04 PROCEDURE — C1729 CATH, DRAINAGE: HCPCS | Performed by: NEUROLOGICAL SURGERY

## 2017-10-04 PROCEDURE — 0SG107J FUSION OF 2 OR MORE LUMBAR VERTEBRAL JOINTS WITH AUTOLOGOUS TISSUE SUBSTITUTE, POSTERIOR APPROACH, ANTERIOR COLUMN, OPEN APPROACH: ICD-10-PCS | Performed by: NEUROLOGICAL SURGERY

## 2017-10-04 PROCEDURE — 63600175 PHARM REV CODE 636 W HCPCS: Performed by: NURSE ANESTHETIST, CERTIFIED REGISTERED

## 2017-10-04 PROCEDURE — C1713 ANCHOR/SCREW BN/BN,TIS/BN: HCPCS | Performed by: NEUROLOGICAL SURGERY

## 2017-10-04 PROCEDURE — 22585 ARTHRD ANT NTRBD MIN DSC EA: CPT | Mod: ,,, | Performed by: NEUROLOGICAL SURGERY

## 2017-10-04 PROCEDURE — 71000039 HC RECOVERY, EACH ADD'L HOUR: Performed by: NEUROLOGICAL SURGERY

## 2017-10-04 PROCEDURE — 85025 COMPLETE CBC W/AUTO DIFF WBC: CPT | Mod: 91

## 2017-10-04 PROCEDURE — 36000711: Performed by: NEUROLOGICAL SURGERY

## 2017-10-04 PROCEDURE — 80307 DRUG TEST PRSMV CHEM ANLYZR: CPT

## 2017-10-04 PROCEDURE — 25000003 PHARM REV CODE 250: Performed by: NEUROLOGICAL SURGERY

## 2017-10-04 PROCEDURE — 20936 SP BONE AGRFT LOCAL ADD-ON: CPT | Mod: ,,, | Performed by: NEUROLOGICAL SURGERY

## 2017-10-04 PROCEDURE — 63600175 PHARM REV CODE 636 W HCPCS: Performed by: PHYSICIAN ASSISTANT

## 2017-10-04 PROCEDURE — 27201423 OPTIME MED/SURG SUP & DEVICES STERILE SUPPLY: Performed by: NEUROLOGICAL SURGERY

## 2017-10-04 PROCEDURE — 36620 INSERTION CATHETER ARTERY: CPT | Mod: 59,,, | Performed by: ANESTHESIOLOGY

## 2017-10-04 PROCEDURE — 20600001 HC STEP DOWN PRIVATE ROOM

## 2017-10-04 PROCEDURE — 25000003 PHARM REV CODE 250: Performed by: STUDENT IN AN ORGANIZED HEALTH CARE EDUCATION/TRAINING PROGRAM

## 2017-10-04 PROCEDURE — 22854 INSJ BIOMECHANICAL DEVICE: CPT | Mod: ,,, | Performed by: NEUROLOGICAL SURGERY

## 2017-10-04 PROCEDURE — 37000008 HC ANESTHESIA 1ST 15 MINUTES: Performed by: NEUROLOGICAL SURGERY

## 2017-10-04 PROCEDURE — 25000003 PHARM REV CODE 250: Performed by: NURSE ANESTHETIST, CERTIFIED REGISTERED

## 2017-10-04 PROCEDURE — 22845 INSERT SPINE FIXATION DEVICE: CPT | Mod: 59,,, | Performed by: NEUROLOGICAL SURGERY

## 2017-10-04 PROCEDURE — 20930 SP BONE ALGRFT MORSEL ADD-ON: CPT | Mod: ,,, | Performed by: NEUROLOGICAL SURGERY

## 2017-10-04 PROCEDURE — 63600175 PHARM REV CODE 636 W HCPCS: Performed by: NEUROLOGICAL SURGERY

## 2017-10-04 PROCEDURE — 0SG00A0 FUSION OF LUMBAR VERTEBRAL JOINT WITH INTERBODY FUSION DEVICE, ANTERIOR APPROACH, ANTERIOR COLUMN, OPEN APPROACH: ICD-10-PCS | Performed by: NEUROLOGICAL SURGERY

## 2017-10-04 PROCEDURE — 22558 ARTHRD ANT NTRBD MIN DSC LUM: CPT | Mod: 51,,, | Performed by: NEUROLOGICAL SURGERY

## 2017-10-04 PROCEDURE — 63600175 PHARM REV CODE 636 W HCPCS: Performed by: ANESTHESIOLOGY

## 2017-10-04 PROCEDURE — 37000009 HC ANESTHESIA EA ADD 15 MINS: Performed by: NEUROLOGICAL SURGERY

## 2017-10-04 PROCEDURE — 36000710: Performed by: NEUROLOGICAL SURGERY

## 2017-10-04 PROCEDURE — 71000033 HC RECOVERY, INTIAL HOUR: Performed by: NEUROLOGICAL SURGERY

## 2017-10-04 DEVICE — PUTTY ACTIFUSE ABX 10ML: Type: IMPLANTABLE DEVICE | Site: BACK | Status: FUNCTIONAL

## 2017-10-04 DEVICE — IMPLANTABLE DEVICE: Type: IMPLANTABLE DEVICE | Site: SPINE LUMBAR | Status: FUNCTIONAL

## 2017-10-04 RX ORDER — GLYCOPYRROLATE 0.2 MG/ML
INJECTION INTRAMUSCULAR; INTRAVENOUS
Status: DISCONTINUED | OUTPATIENT
Start: 2017-10-04 | End: 2017-10-04

## 2017-10-04 RX ORDER — ONDANSETRON 8 MG/1
8 TABLET, ORALLY DISINTEGRATING ORAL EVERY 6 HOURS PRN
Status: DISCONTINUED | OUTPATIENT
Start: 2017-10-04 | End: 2017-10-10 | Stop reason: HOSPADM

## 2017-10-04 RX ORDER — HALOPERIDOL 5 MG/ML
1 INJECTION INTRAMUSCULAR ONCE AS NEEDED
Status: DISCONTINUED | OUTPATIENT
Start: 2017-10-04 | End: 2017-10-04 | Stop reason: HOSPADM

## 2017-10-04 RX ORDER — SODIUM CHLORIDE 9 MG/ML
INJECTION, SOLUTION INTRAVENOUS CONTINUOUS
Status: DISCONTINUED | OUTPATIENT
Start: 2017-10-04 | End: 2017-10-05

## 2017-10-04 RX ORDER — CEFAZOLIN SODIUM 1 G/50ML
1 SOLUTION INTRAVENOUS
Status: DISCONTINUED | OUTPATIENT
Start: 2017-10-05 | End: 2017-10-06

## 2017-10-04 RX ORDER — PHENYLEPHRINE HYDROCHLORIDE 10 MG/ML
INJECTION INTRAVENOUS
Status: DISCONTINUED | OUTPATIENT
Start: 2017-10-04 | End: 2017-10-04

## 2017-10-04 RX ORDER — FENTANYL CITRATE 50 UG/ML
INJECTION, SOLUTION INTRAMUSCULAR; INTRAVENOUS
Status: DISCONTINUED | OUTPATIENT
Start: 2017-10-04 | End: 2017-10-04

## 2017-10-04 RX ORDER — OXYCODONE AND ACETAMINOPHEN 5; 325 MG/1; MG/1
1 TABLET ORAL EVERY 4 HOURS PRN
Status: DISCONTINUED | OUTPATIENT
Start: 2017-10-04 | End: 2017-10-10 | Stop reason: HOSPADM

## 2017-10-04 RX ORDER — LIDOCAINE HCL/PF 100 MG/5ML
SYRINGE (ML) INTRAVENOUS
Status: DISCONTINUED | OUTPATIENT
Start: 2017-10-04 | End: 2017-10-04

## 2017-10-04 RX ORDER — KETAMINE HYDROCHLORIDE 100 MG/ML
INJECTION, SOLUTION INTRAMUSCULAR; INTRAVENOUS
Status: DISCONTINUED | OUTPATIENT
Start: 2017-10-04 | End: 2017-10-04

## 2017-10-04 RX ORDER — ACETAMINOPHEN 325 MG/1
650 TABLET ORAL EVERY 4 HOURS PRN
Status: DISCONTINUED | OUTPATIENT
Start: 2017-10-04 | End: 2017-10-10 | Stop reason: HOSPADM

## 2017-10-04 RX ORDER — MAG HYDROX/ALUMINUM HYD/SIMETH 200-200-20
30 SUSPENSION, ORAL (FINAL DOSE FORM) ORAL EVERY 4 HOURS PRN
Status: DISCONTINUED | OUTPATIENT
Start: 2017-10-04 | End: 2017-10-10 | Stop reason: HOSPADM

## 2017-10-04 RX ORDER — ONDANSETRON HYDROCHLORIDE 2 MG/ML
INJECTION, SOLUTION INTRAMUSCULAR; INTRAVENOUS
Status: DISCONTINUED | OUTPATIENT
Start: 2017-10-04 | End: 2017-10-04

## 2017-10-04 RX ORDER — HYDROMORPHONE HYDROCHLORIDE 1 MG/ML
0.2 INJECTION, SOLUTION INTRAMUSCULAR; INTRAVENOUS; SUBCUTANEOUS EVERY 5 MIN PRN
Status: DISCONTINUED | OUTPATIENT
Start: 2017-10-04 | End: 2017-10-04 | Stop reason: HOSPADM

## 2017-10-04 RX ORDER — ALBUMIN HUMAN 50 G/1000ML
SOLUTION INTRAVENOUS CONTINUOUS PRN
Status: DISCONTINUED | OUTPATIENT
Start: 2017-10-04 | End: 2017-10-04

## 2017-10-04 RX ORDER — CEFAZOLIN SODIUM 2 G/50ML
2 SOLUTION INTRAVENOUS
Status: COMPLETED | OUTPATIENT
Start: 2017-10-04 | End: 2017-10-05

## 2017-10-04 RX ORDER — ACETAMINOPHEN 10 MG/ML
INJECTION, SOLUTION INTRAVENOUS
Status: DISCONTINUED | OUTPATIENT
Start: 2017-10-04 | End: 2017-10-04

## 2017-10-04 RX ORDER — HYDROMORPHONE HYDROCHLORIDE 1 MG/ML
0.5 INJECTION, SOLUTION INTRAMUSCULAR; INTRAVENOUS; SUBCUTANEOUS
Status: DISCONTINUED | OUTPATIENT
Start: 2017-10-04 | End: 2017-10-04

## 2017-10-04 RX ORDER — BACITRACIN 50000 [IU]/1
INJECTION, POWDER, FOR SOLUTION INTRAMUSCULAR
Status: DISCONTINUED | OUTPATIENT
Start: 2017-10-04 | End: 2017-10-04 | Stop reason: HOSPADM

## 2017-10-04 RX ORDER — LIDOCAINE HYDROCHLORIDE 10 MG/ML
1 INJECTION, SOLUTION EPIDURAL; INFILTRATION; INTRACAUDAL; PERINEURAL ONCE
Status: DISCONTINUED | OUTPATIENT
Start: 2017-10-04 | End: 2017-10-04 | Stop reason: HOSPADM

## 2017-10-04 RX ORDER — LIDOCAINE HYDROCHLORIDE AND EPINEPHRINE 15; 5 MG/ML; UG/ML
INJECTION, SOLUTION EPIDURAL
Status: DISCONTINUED | OUTPATIENT
Start: 2017-10-04 | End: 2017-10-04 | Stop reason: HOSPADM

## 2017-10-04 RX ORDER — SODIUM CHLORIDE 0.9 % (FLUSH) 0.9 %
3 SYRINGE (ML) INJECTION
Status: DISCONTINUED | OUTPATIENT
Start: 2017-10-04 | End: 2017-10-04 | Stop reason: HOSPADM

## 2017-10-04 RX ORDER — MUPIROCIN 20 MG/G
1 OINTMENT TOPICAL
Status: COMPLETED | OUTPATIENT
Start: 2017-10-04 | End: 2017-10-04

## 2017-10-04 RX ORDER — ONDANSETRON 2 MG/ML
INJECTION INTRAMUSCULAR; INTRAVENOUS
Status: DISCONTINUED | OUTPATIENT
Start: 2017-10-04 | End: 2017-10-04

## 2017-10-04 RX ORDER — ONDANSETRON 2 MG/ML
4 INJECTION INTRAMUSCULAR; INTRAVENOUS DAILY PRN
Status: DISCONTINUED | OUTPATIENT
Start: 2017-10-04 | End: 2017-10-04 | Stop reason: HOSPADM

## 2017-10-04 RX ORDER — AMOXICILLIN 250 MG
2 CAPSULE ORAL NIGHTLY PRN
Status: DISCONTINUED | OUTPATIENT
Start: 2017-10-04 | End: 2017-10-10 | Stop reason: HOSPADM

## 2017-10-04 RX ORDER — HYDROMORPHONE HYDROCHLORIDE 1 MG/ML
0.5 INJECTION, SOLUTION INTRAMUSCULAR; INTRAVENOUS; SUBCUTANEOUS
Status: DISCONTINUED | OUTPATIENT
Start: 2017-10-04 | End: 2017-10-10 | Stop reason: HOSPADM

## 2017-10-04 RX ORDER — MIDAZOLAM HYDROCHLORIDE 5 MG/ML
INJECTION INTRAMUSCULAR; INTRAVENOUS
Status: DISCONTINUED | OUTPATIENT
Start: 2017-10-04 | End: 2017-10-04

## 2017-10-04 RX ORDER — ROCURONIUM BROMIDE 10 MG/ML
INJECTION, SOLUTION INTRAVENOUS
Status: DISCONTINUED | OUTPATIENT
Start: 2017-10-04 | End: 2017-10-04

## 2017-10-04 RX ORDER — SODIUM CHLORIDE 9 MG/ML
INJECTION, SOLUTION INTRAVENOUS CONTINUOUS
Status: DISCONTINUED | OUTPATIENT
Start: 2017-10-04 | End: 2017-10-04

## 2017-10-04 RX ORDER — NEOSTIGMINE METHYLSULFATE 1 MG/ML
INJECTION, SOLUTION INTRAVENOUS
Status: DISCONTINUED | OUTPATIENT
Start: 2017-10-04 | End: 2017-10-04

## 2017-10-04 RX ORDER — PROPOFOL 10 MG/ML
VIAL (ML) INTRAVENOUS
Status: DISCONTINUED | OUTPATIENT
Start: 2017-10-04 | End: 2017-10-04

## 2017-10-04 RX ORDER — BISACODYL 10 MG
10 SUPPOSITORY, RECTAL RECTAL DAILY
Status: DISCONTINUED | OUTPATIENT
Start: 2017-10-05 | End: 2017-10-10 | Stop reason: HOSPADM

## 2017-10-04 RX ADMIN — ACETAMINOPHEN 1000 MG: 10 INJECTION, SOLUTION INTRAVENOUS at 03:10

## 2017-10-04 RX ADMIN — CALCIUM CHLORIDE 0.5 G: 100 INJECTION, SOLUTION INTRAVENOUS at 06:10

## 2017-10-04 RX ADMIN — HYDROMORPHONE HYDROCHLORIDE 0.5 MG: 1 INJECTION, SOLUTION INTRAMUSCULAR; INTRAVENOUS; SUBCUTANEOUS at 01:10

## 2017-10-04 RX ADMIN — KETAMINE HYDROCHLORIDE 30 MG: 100 INJECTION, SOLUTION, CONCENTRATE INTRAMUSCULAR; INTRAVENOUS at 03:10

## 2017-10-04 RX ADMIN — MUPIROCIN 1 G: 20 OINTMENT TOPICAL at 01:10

## 2017-10-04 RX ADMIN — SODIUM CHLORIDE, SODIUM GLUCONATE, SODIUM ACETATE, POTASSIUM CHLORIDE, MAGNESIUM CHLORIDE, SODIUM PHOSPHATE, DIBASIC, AND POTASSIUM PHOSPHATE: .53; .5; .37; .037; .03; .012; .00082 INJECTION, SOLUTION INTRAVENOUS at 05:10

## 2017-10-04 RX ADMIN — Medication 2 G: at 03:10

## 2017-10-04 RX ADMIN — KETAMINE HYDROCHLORIDE 10 MG: 100 INJECTION, SOLUTION, CONCENTRATE INTRAMUSCULAR; INTRAVENOUS at 05:10

## 2017-10-04 RX ADMIN — SODIUM CHLORIDE, SODIUM GLUCONATE, SODIUM ACETATE, POTASSIUM CHLORIDE, MAGNESIUM CHLORIDE, SODIUM PHOSPHATE, DIBASIC, AND POTASSIUM PHOSPHATE: .53; .5; .37; .037; .03; .012; .00082 INJECTION, SOLUTION INTRAVENOUS at 03:10

## 2017-10-04 RX ADMIN — MIDAZOLAM 0.5 MG: 5 INJECTION INTRAMUSCULAR; INTRAVENOUS at 02:10

## 2017-10-04 RX ADMIN — FENTANYL CITRATE 50 MCG: 50 INJECTION, SOLUTION INTRAMUSCULAR; INTRAVENOUS at 07:10

## 2017-10-04 RX ADMIN — FENTANYL CITRATE 50 MCG: 50 INJECTION, SOLUTION INTRAMUSCULAR; INTRAVENOUS at 06:10

## 2017-10-04 RX ADMIN — ONDANSETRON 4 MG: 2 INJECTION, SOLUTION INTRAMUSCULAR; INTRAVENOUS at 06:10

## 2017-10-04 RX ADMIN — SODIUM CHLORIDE: 0.9 INJECTION, SOLUTION INTRAVENOUS at 07:10

## 2017-10-04 RX ADMIN — FENTANYL CITRATE 50 MCG: 50 INJECTION, SOLUTION INTRAMUSCULAR; INTRAVENOUS at 04:10

## 2017-10-04 RX ADMIN — PROPOFOL 170 MG: 10 INJECTION, EMULSION INTRAVENOUS at 02:10

## 2017-10-04 RX ADMIN — OXYCODONE HYDROCHLORIDE AND ACETAMINOPHEN 1 TABLET: 5; 325 TABLET ORAL at 04:10

## 2017-10-04 RX ADMIN — CLONAZEPAM 2 MG: 1 TABLET ORAL at 10:10

## 2017-10-04 RX ADMIN — FENTANYL CITRATE 150 MCG: 50 INJECTION, SOLUTION INTRAMUSCULAR; INTRAVENOUS at 02:10

## 2017-10-04 RX ADMIN — PHENYLEPHRINE HYDROCHLORIDE 0.25 MCG: 10 INJECTION INTRAVENOUS at 04:10

## 2017-10-04 RX ADMIN — NEOSTIGMINE METHYLSULFATE 3 MG: 1 INJECTION INTRAVENOUS at 06:10

## 2017-10-04 RX ADMIN — GLYCOPYRROLATE 0.2 MG: 0.2 INJECTION, SOLUTION INTRAMUSCULAR; INTRAVENOUS at 06:10

## 2017-10-04 RX ADMIN — OXYCODONE HYDROCHLORIDE AND ACETAMINOPHEN 1 TABLET: 5; 325 TABLET ORAL at 10:10

## 2017-10-04 RX ADMIN — ROCURONIUM BROMIDE 40 MG: 10 INJECTION, SOLUTION INTRAVENOUS at 02:10

## 2017-10-04 RX ADMIN — LIDOCAINE HYDROCHLORIDE 60 MG: 20 INJECTION, SOLUTION INTRAVENOUS at 02:10

## 2017-10-04 RX ADMIN — SODIUM CHLORIDE, SODIUM GLUCONATE, SODIUM ACETATE, POTASSIUM CHLORIDE, MAGNESIUM CHLORIDE, SODIUM PHOSPHATE, DIBASIC, AND POTASSIUM PHOSPHATE: .53; .5; .37; .037; .03; .012; .00082 INJECTION, SOLUTION INTRAVENOUS at 06:10

## 2017-10-04 RX ADMIN — ALBUMIN (HUMAN): 12.5 SOLUTION INTRAVENOUS at 05:10

## 2017-10-04 RX ADMIN — PHENYLEPHRINE HYDROCHLORIDE 100 MCG: 10 INJECTION INTRAVENOUS at 04:10

## 2017-10-04 NOTE — SUBJECTIVE & OBJECTIVE
Interval History: Abdomen continues soft and non tender, pt without abdominal pain complaints.     Medications:  Continuous Infusions:   sodium chloride 0.9% 75 mL/hr at 10/04/17 0625     Scheduled Meds:   clonazePAM  2 mg Oral QHS     PRN Meds:dextrose 50%, dextrose 50%, diazePAM, glucagon (human recombinant), glucose, glucose, glucose, labetalol, morphine, oxycodone-acetaminophen     Review of patient's allergies indicates:  No Known Allergies  Objective:     Vital Signs (Most Recent):  Temp: 97.9 °F (36.6 °C) (10/04/17 0735)  Pulse: 95 (10/04/17 0735)  Resp: 16 (10/04/17 0735)  BP: 117/70 (10/04/17 0735)  SpO2: 95 % (10/04/17 0735) Vital Signs (24h Range):  Temp:  [96.6 °F (35.9 °C)-97.9 °F (36.6 °C)] 97.9 °F (36.6 °C)  Pulse:  [66-95] 95  Resp:  [12-20] 16  SpO2:  [92 %-100 %] 95 %  BP: (104-127)/(52-74) 117/70     Weight: 54.6 kg (120 lb 4.8 oz)  Body mass index is 22.73 kg/m².    Intake/Output - Last 3 Shifts       10/02 0700 - 10/03 0659 10/03 0700 - 10/04 0659 10/04 0700 - 10/05 0659    P.O.  0     I.V. (mL/kg)  528     Total Intake(mL/kg)  528     Emesis/NG output  0     Other  0     Stool  0     Blood  0     Total Output   0      Net   +528             Stool Occurrence  0 x     Emesis Occurrence  0 x           Physical Exam   Cardiovascular: Normal rate.    Pulmonary/Chest: Effort normal.   Abdominal: Soft. She exhibits no distension. There is no tenderness.       Significant Labs:  CBC:   Recent Labs  Lab 10/04/17  0425   WBC 10.11   RBC 4.27   HGB 12.5   HCT 37.3   *   MCV 87   MCH 29.3   MCHC 33.5     CMP:   Recent Labs  Lab 10/03/17  1500 10/04/17  0425   * 105   CALCIUM 9.8 9.2   ALBUMIN 3.6  --    PROT 7.6  --     137   K 3.9 4.0   CO2 22* 27    103   BUN 13 14   CREATININE 0.8 0.8   ALKPHOS 111  --    ALT 21  --    AST 33  --    BILITOT 0.5  --        Significant Diagnostics:  CT: I have reviewed all pertinent results/findings within the past 24 hours and my personal  findings are:  no intra-abdominal processess

## 2017-10-04 NOTE — ANESTHESIA PROCEDURE NOTES
Central Line    Diagnosis: spine fracture   Patient location during procedure: done in OR  Procedure start time: 10/4/2017 3:05 PM  Timeout: 10/4/2017 3:03 PM  Procedure end time: 10/4/2017 3:19 PM  Staffing  Anesthesiologist: LINDA BARCENAS  Performed: anesthesiologist   Anesthesiologist was present at the time of the procedure.  Preanesthetic Checklist  Completed: patient identified, site marked, surgical consent, pre-op evaluation, timeout performed, IV checked, risks and benefits discussed, monitors and equipment checked and anesthesia consent given  Indication  Indication: vascular access     Anesthesia   general anesthesia    Central Line  Skin Prep: skin prepped with ChloraPrep, skin prep agent completely dried prior to procedure  maximum sterile barriers used during central venous catheter insertion  hand hygiene performed prior to central venous catheter insertion  Location: right internal jugular,   Catheter type: double lumen  Catheter Size: 8 Fr  Inserted Catheter Length: 13 cm  Ultrasound: vascular probe with ultrasound  Vessel Caliber: small, patent  Vascular Doppler:  not done, compressibility normal  Needle advanced into vessel with real time Ultrasound guidance.  Guidewire confirmed in vessel.  Sterile sheath used.   Manometry: Venous cannualation confirmed by visual estimation of blood vessel pressure using manometry.  Insertion Attempts: 1   Securement:line sutured, chlorhexidine patch, sterile dressing applied and blood return through all ports     Post-Procedure  X-Ray: no pneumothorax on x-ray  Adverse Events:none

## 2017-10-04 NOTE — PLAN OF CARE
Problem: Patient Care Overview  Goal: Plan of Care Review  Outcome: Ongoing (interventions implemented as appropriate)  Safety:  call light in reach, patient oriented to room & instructed how to notify nurse if assistance is needed, questions & concerns addressed, bed in lowest position with wheels locked & side rails up X 2, fall precautions followed, patient free from fall & injury thus far this shift;  VTE/bleeding precautions maintained.  Activity:  patient log rolled with assistance, weight shifted at least every other hour.  Neurological:  patient A&O X 4, follows commands, equal  strength & dorsi/plantarflexion, neuro checks performed as ordered & WDL.  Respiratory:  patient tolerates room air without distress, denies SOB.  Cardiac:  Denies chest pain, BP stable.  HR stable.  Afebrile this shift.  GI:  Patient tolerates NPO status well, denies nausea, LBM 10/3/2017.  :  Patient has not voided this shift, notified neurosurgery who stated to bladder scan and if greater than 300 ml to place FC to gravity prior to surgery, bladder scan revealed 325 ml, day shift RN João stated he would place FC as verbal order was given.  Skin:  CDI.  Devices:  PIV CDI, negative for s/sx of infection & infiltration.  Pain:  patient received prn pain medication as ordered.  Brace to cervical and lumbar region remained in place, ankle remains wrapped in splint.

## 2017-10-04 NOTE — HOSPITAL COURSE
10/3: OR for L2 corpectomy & fusion.  10/5: Required straight cath x 1 for retention. PT/OT recommend rehab. Left and right drain outputs are 80 and 55 overnight, respectively.  10/6: Voiding s/p marsh removal. Hypotensive, tachycardic overnight. Afebrile. 1/2L bolus given. Bcx, UA, Ucx, CXR ordered. Will remove drains today.  10/8: Febrile over the weekend. Broad spectrum abx started, then stopped as workup was negative. However, bcx from 10/6 with GPRs today. Will check 2 bcx to rule out contaminant.  10/9: Afebrile overnight. Has progressed to outpatient therapy when appropriate.  10/10: Afebrile overnight. Continues to do well with therapy. Unable to obtain sputum cx due to nonproductive cough. However, cough has improved with chest physio and duonebs.

## 2017-10-04 NOTE — PROGRESS NOTES
[facility]  General Surgery  Progress Note    Subjective:     History of Present Illness:  No notes on file    Post-Op Info:  Procedure(s) (LRB):  DIRECT LATERAL INTERBODY FUSION/Lateral L2 corpectomy with posterior L1-3 fusion (N/A)         Interval History: Abdomen continues soft and non tender, pt without abdominal pain complaints.     Medications:  Continuous Infusions:   sodium chloride 0.9% 75 mL/hr at 10/04/17 0625     Scheduled Meds:   clonazePAM  2 mg Oral QHS     PRN Meds:dextrose 50%, dextrose 50%, diazePAM, glucagon (human recombinant), glucose, glucose, glucose, labetalol, morphine, oxycodone-acetaminophen     Review of patient's allergies indicates:  No Known Allergies  Objective:     Vital Signs (Most Recent):  Temp: 97.9 °F (36.6 °C) (10/04/17 0735)  Pulse: 95 (10/04/17 0735)  Resp: 16 (10/04/17 0735)  BP: 117/70 (10/04/17 0735)  SpO2: 95 % (10/04/17 0735) Vital Signs (24h Range):  Temp:  [96.6 °F (35.9 °C)-97.9 °F (36.6 °C)] 97.9 °F (36.6 °C)  Pulse:  [66-95] 95  Resp:  [12-20] 16  SpO2:  [92 %-100 %] 95 %  BP: (104-127)/(52-74) 117/70     Weight: 54.6 kg (120 lb 4.8 oz)  Body mass index is 22.73 kg/m².    Intake/Output - Last 3 Shifts       10/02 0700 - 10/03 0659 10/03 0700 - 10/04 0659 10/04 0700 - 10/05 0659    P.O.  0     I.V. (mL/kg)  528     Total Intake(mL/kg)  528     Emesis/NG output  0     Other  0     Stool  0     Blood  0     Total Output   0      Net   +528             Stool Occurrence  0 x     Emesis Occurrence  0 x           Physical Exam   Cardiovascular: Normal rate.    Pulmonary/Chest: Effort normal.   Abdominal: Soft. She exhibits no distension. There is no tenderness.       Significant Labs:  CBC:   Recent Labs  Lab 10/04/17  0425   WBC 10.11   RBC 4.27   HGB 12.5   HCT 37.3   *   MCV 87   MCH 29.3   MCHC 33.5     CMP:   Recent Labs  Lab 10/03/17  1500 10/04/17  0425   * 105   CALCIUM 9.8 9.2   ALBUMIN 3.6  --    PROT 7.6  --     137   K  3.9 4.0   CO2 22* 27    103   BUN 13 14   CREATININE 0.8 0.8   ALKPHOS 111  --    ALT 21  --    AST 33  --    BILITOT 0.5  --        Significant Diagnostics:  CT: I have reviewed all pertinent results/findings within the past 24 hours and my personal findings are:  no intra-abdominal processess    Assessment/Plan:     No notes have been filed under this hospital service.  Service: General Surgery      Semaj Capone MD  General Surgery  [facility]

## 2017-10-04 NOTE — PLAN OF CARE
CM met with patient and boyfriend who is at the bedside. Patient is lying flat in bed and is scheduled to go to surgery today. Planned discharge is home with family - Plan (A) or Rehab - Plan (B).    PCP:  The patient states she has a primary care physician but cannot recall name.    Payor: MEDICAID / Plan: [facility] ([place] MEDICAID) / Product Type: Managed Medicaid /       10/04/17 1224   Discharge Assessment   Assessment Type Discharge Planning Assessment   Confirmed/corrected address and phone number on facesheet? Yes   Assessment information obtained from? Patient   Communicated expected length of stay with patient/caregiver no   Prior to hospitilization cognitive status: Alert/Oriented   Prior to hospitalization functional status: Independent   Current cognitive status: Alert/Oriented   Current Functional Status: Independent   Lives With parent(s)   Able to Return to Prior Arrangements yes   Is patient able to care for self after discharge? Yes   Who are your caregiver(s) and their phone number(s)? Lizet Beltran - mother [phone], father Guillaume Nance [phone]   Patient's perception of discharge disposition home or selfcare   Readmission Within The Last 30 Days no previous admission in last 30 days   Patient currently being followed by outpatient case management? No   Patient currently receives any other outside agency services? No   Equipment Currently Used at Home none   Do you have any problems affording any of your prescribed medications? No   Is the patient taking medications as prescribed? yes   Does the patient have transportation home? Yes   Discharge Plan A Home with family   Discharge Plan B Rehab   Patient/Family In Agreement With Plan yes

## 2017-10-04 NOTE — ANESTHESIA PROCEDURE NOTES
Arterial    Diagnosis: spine fracture     Patient location during procedure: done in OR  Procedure start time: 10/4/2017 3:02 PM  Timeout: 10/4/2017 3:01 PM  Procedure end time: 10/4/2017 3:16 PM  Staffing  Anesthesiologist: LINDA BARCENAS  Resident/CRNA: GIACOMO HEADLEY JR  Performed: anesthesiologist and resident/CRNA   Anesthesiologist was present at the time of the procedure.  Preanesthetic Checklist  Completed: patient identified, site marked, surgical consent, pre-op evaluation, timeout performed, IV checked, risks and benefits discussed, monitors and equipment checked and anesthesia consent givenArterial  Skin Prep: chlorhexidine gluconate and isopropyl alcohol  Local Infiltration: none  Orientation: right  Location: radial  Catheter Size: 20 G  Catheter placement by Anatomical landmarks. Heme positive aspiration all ports.Insertion Attempts: 3  Assessment  Dressing: secured with tape and tegaderm  Patient: Tolerated well

## 2017-10-04 NOTE — SUBJECTIVE & OBJECTIVE
"Interval History: Ms. Blakely complains of severe back pain today.  She had urinary retention overnight and a Hastings was placed.  She denies any LE weakness or paresthesias or radicular pain.      Medications:  Continuous Infusions:   sodium chloride 0.9% 75 mL/hr at 10/04/17 0625     Scheduled Meds:   clonazePAM  2 mg Oral QHS     PRN Meds:ceFAZolin (ANCEF) IVPB, dextrose 50%, dextrose 50%, diazePAM, glucagon (human recombinant), glucose, glucose, glucose, labetalol, morphine, mupirocin, oxycodone-acetaminophen     Review of Systems  Objective:     Weight: 54.6 kg (120 lb 4.8 oz)  Body mass index is 22.73 kg/m².  Vital Signs (Most Recent):  Temp: 98.5 °F (36.9 °C) (10/04/17 1149)  Pulse: 91 (10/04/17 1149)  Resp: 18 (10/04/17 1149)  BP: 114/77 (10/04/17 1149)  SpO2: (!) 93 % (10/04/17 1149) Vital Signs (24h Range):  Temp:  [96.6 °F (35.9 °C)-98.5 °F (36.9 °C)] 98.5 °F (36.9 °C)  Pulse:  [77-95] 91  Resp:  [12-20] 18  SpO2:  [92 %-97 %] 93 %  BP: (107-127)/(63-77) 114/77       Date 10/04/17 0700 - 10/05/17 0659   Shift 9559-1557 [id number] 24 Hour Total   I  N  T  A  K  E   Shift Total  (mL/kg)       O  U  T  P  U  T   Urine  (mL/kg/hr) 950   950    Shift Total  (mL/kg) 950  (17.4)   950  (17.4)   Weight (kg) 54.6 54.6 54.6 54.6                        Urethral Catheter 10/04/17 0910 Non-latex 16 Fr. (Active)   Site Assessment Clean;Intact 10/4/2017 11:57 AM   Collection Container Leg bag 10/4/2017 11:57 AM   Securement Method secured to top of thigh w/ adhesive device 10/4/2017 11:57 AM   Catheter Care Performed yes 10/4/2017  9:10 AM   Reason for Continuing Urinary Catheterization Urinary retention 10/4/2017  9:10 AM   CAUTI Prevention Bundle StatLock in place w 1" slack;Intact seal between catheter & drainage tubing;Drainage bag off the floor;Green sheeting clip in use;No dependent loops or kinks;Drainage bag not overfilled (<2/3 full);Drainage bag below bladder 10/4/2017  9:10 AM   Output (mL) 575 mL " 10/4/2017  9:10 AM       Neurosurgery Physical Exam   General: well developed, well nourished, no distress  Head: normocephalic, atraumatic  Neurologic: Alert and oriented. Thought content appropriate  GCS: Motor: 6/Verbal: 5/Eyes: 4 GCS Total: 15  Mental Status: Awake, Alert, Oriented x 4  Language: No aphasia  Speech: No dysarthria  Cranial nerves: face symmetric, tongue midline, CN II-XII grossly intact.   Eyes: pupils equal, round, reactive to light with accommodation, EOMI  Pulmonary: normal respirations, not labored, no accessory muscles used  Abdomen: soft, non-distended, not tender to palpation  Sensory: intact to light touch throughout  Motor Strength: Moves all extremities spontaneously with good tone.   No abnormal movements seen.     Strength  Deltoids Triceps Biceps Wrist Extension Wrist Flexion Hand    Upper: R 5/5 5/5 5/5 5/5 5/5 5/5    L 5/5 5/5 5/5 5/5 5/5 5/5     Iliopsoas Quadriceps Knee  Flexion Tibialis  anterior Gastro- cnemius EHL   Lower: R          L 5/5 5/5 5/5 5/5 5/5 5/5     Unable to fully examine RLE due to large splint to knee, can wiggle toes & at least 3/5 HF  Pronator Drift: no drift noted  Finger-to-nose: Intact bilaterally  Lord: absent  Clonus: absent  Babinski: absent  Pulses: 2+ and symmetric radial and dorsalis pedis  Skin: warm, dry and intact, no rashes  No TTP Cspine, no pain with flex/ext, bilateral rotation      Significant Labs:    Recent Labs  Lab 10/03/17  1500 10/04/17  0425   * 105    137   K 3.9 4.0    103   CO2 22* 27   BUN 13 14   CREATININE 0.8 0.8   CALCIUM 9.8 9.2       Recent Labs  Lab 10/03/17  1234 10/04/17  0425   WBC 21.04* 10.11   HGB 14.9 12.5   HCT 43.3 37.3   * 374*       Recent Labs  Lab 10/03/17  1500   INR 1.0   APTT 22.1     Microbiology Results (last 7 days)     ** No results found for the last 168 hours. **          Significant Diagnostics:  I personally reviewed MRI Lumbar spine & agree with findings: Acute  compression/burst fracture of the L2 vertebral body with extension into the left pedicle as detailed above. Retropulsion of fracture fragments with mass effect upon the thecal sac resulting in severe spinal canal stenosis at this level. Small amount of anterior epidural hemorrhage.    Nondisplaced fracture involving the L1 vertebral body, without significant height loss or retropulsion of fracture fragments.

## 2017-10-04 NOTE — NURSING TRANSFER
Nursing Transfer Note      10/4/2017     Transfer To: surgery    Transfer via stretcher    Transfer with marsh    Transported by surgery staff    Medicines sent: N/A    Chart send with patient: Yes    Notified: spouse

## 2017-10-04 NOTE — ASSESSMENT & PLAN NOTE
47 y.o. female s/p 30-ft fall from tree with L2 burst fracture with central retropulsion  - LSO brace at all times. Bed rest  - Has been NPO  - OR today for lateral L2 corpectomy and L1-3 posterior fusion  - Preop complete, pt marked, consents on chart  - Urinary retention  - Ortho: WBAT for +/- pubic ramus fracture. Navicular fracture splinted, no surgical intervention warranted.  - General surgery following, no evidence for abd injury on CT & exam without concern for abd injury  - Hospital medicine cleared patient for surgery

## 2017-10-04 NOTE — PROGRESS NOTES
[facility] [address]  Neurosurgery  Progress Note    Subjective:     History of Present Illness: Candy Blakely is a 47 y.o. female PMH substance abuse who presents s/p approximate 30-foot fall from a tree while intoxicated today. Pt presented with complaints of low back and pelvic pain. Plain imaging revealed fractures of the right inferior pubic ramus, right navicula, and severe burst fracture of L2. CT revealed aforementioned burst fracture with centrally displaced bone fragments and severe cord compression. Neurosurgery was consulted for evaluation.    Post-Op Info:  Procedure(s) (LRB):  DIRECT LATERAL INTERBODY FUSION/Lateral L2 corpectomy with posterior L1-3 fusion (N/A)   Day of Surgery   Interval History: Ms. Blakely complains of severe back pain today.  She had urinary retention overnight and a Hastings was placed.  She denies any LE weakness or paresthesias or radicular pain.      Medications:  Continuous Infusions:   sodium chloride 0.9% 75 mL/hr at 10/04/17 0625     Scheduled Meds:   clonazePAM  2 mg Oral QHS     PRN Meds:ceFAZolin (ANCEF) IVPB, dextrose 50%, dextrose 50%, diazePAM, glucagon (human recombinant), glucose, glucose, glucose, labetalol, morphine, mupirocin, oxycodone-acetaminophen     Review of Systems  Objective:     Weight: 54.6 kg (120 lb 4.8 oz)  Body mass index is 22.73 kg/m².  Vital Signs (Most Recent):  Temp: 98.5 °F (36.9 °C) (10/04/17 1149)  Pulse: 91 (10/04/17 1149)  Resp: 18 (10/04/17 1149)  BP: 114/77 (10/04/17 1149)  SpO2: (!) 93 % (10/04/17 1149) Vital Signs (24h Range):  Temp:  [96.6 °F (35.9 °C)-98.5 °F (36.9 °C)] 98.5 °F (36.9 °C)  Pulse:  [77-95] 91  Resp:  [12-20] 18  SpO2:  [92 %-97 %] 93 %  BP: (107-127)/(63-77) 114/77       Date 10/04/17 0700 - 10/05/17 0659   Shift 7456-7714 8292-0802 6912-2625 24 Hour Total   I  N  T  A  K  E   Shift Total  (mL/kg)       O  U  T  P  U  T   Urine  (mL/kg/hr) 950   950    Shift Total  (mL/kg) 950  (17.4)   950  (17.4)  "  Weight (kg) 54.6 54.6 54.6 54.6                        Urethral Catheter 10/04/17 0910 Non-latex 16 Fr. (Active)   Site Assessment Clean;Intact 10/4/2017 11:57 AM   Collection Container Leg bag 10/4/2017 11:57 AM   Securement Method secured to top of thigh w/ adhesive device 10/4/2017 11:57 AM   Catheter Care Performed yes 10/4/2017  9:10 AM   Reason for Continuing Urinary Catheterization Urinary retention 10/4/2017  9:10 AM   CAUTI Prevention Bundle StatLock in place w 1" slack;Intact seal between catheter & drainage tubing;Drainage bag off the floor;Green sheeting clip in use;No dependent loops or kinks;Drainage bag not overfilled (<2/3 full);Drainage bag below bladder 10/4/2017  9:10 AM   Output (mL) 575 mL 10/4/2017  9:10 AM       Neurosurgery Physical Exam   General: well developed, well nourished, no distress  Head: normocephalic, atraumatic  Neurologic: Alert and oriented. Thought content appropriate  GCS: Motor: 6/Verbal: 5/Eyes: 4 GCS Total: 15  Mental Status: Awake, Alert, Oriented x 4  Language: No aphasia  Speech: No dysarthria  Cranial nerves: face symmetric, tongue midline, CN II-XII grossly intact.   Eyes: pupils equal, round, reactive to light with accommodation, EOMI  Pulmonary: normal respirations, not labored, no accessory muscles used  Abdomen: soft, non-distended, not tender to palpation  Sensory: intact to light touch throughout  Motor Strength: Moves all extremities spontaneously with good tone.   No abnormal movements seen.     Strength  Deltoids Triceps Biceps Wrist Extension Wrist Flexion Hand    Upper: R 5/5 5/5 5/5 5/5 5/5 5/5    L 5/5 5/5 5/5 5/5 5/5 5/5     Iliopsoas Quadriceps Knee  Flexion Tibialis  anterior Gastro- cnemius EHL   Lower: R          L 5/5 5/5 5/5 5/5 5/5 5/5     Unable to fully examine RLE due to large splint to knee, can wiggle toes & at least 3/5 HF  Pronator Drift: no drift noted  Finger-to-nose: Intact bilaterally  Lord: absent  Clonus: absent  Babinski: " absent  Pulses: 2+ and symmetric radial and dorsalis pedis  Skin: warm, dry and intact, no rashes  No TTP Cspine, no pain with flex/ext, bilateral rotation      Significant Labs:    Recent Labs  Lab 10/03/17  1500 10/04/17  0425   * 105    137   K 3.9 4.0    103   CO2 22* 27   BUN 13 14   CREATININE 0.8 0.8   CALCIUM 9.8 9.2       Recent Labs  Lab 10/03/17  1234 10/04/17  0425   WBC 21.04* 10.11   HGB 14.9 12.5   HCT 43.3 37.3   * 374*       Recent Labs  Lab 10/03/17  1500   INR 1.0   APTT 22.1     Microbiology Results (last 7 days)     ** No results found for the last 168 hours. **          Significant Diagnostics:  I personally reviewed MRI Lumbar spine & agree with findings: Acute compression/burst fracture of the L2 vertebral body with extension into the left pedicle as detailed above. Retropulsion of fracture fragments with mass effect upon the thecal sac resulting in severe spinal canal stenosis at this level. Small amount of anterior epidural hemorrhage.    Nondisplaced fracture involving the L1 vertebral body, without significant height loss or retropulsion of fracture fragments.     Assessment/Plan:     * Burst fracture of lumbar vertebra    47 y.o. female s/p 30-ft fall from tree with L2 burst fracture with central retropulsion  - LSO brace at all times. Bed rest  - Has been NPO  - OR today for lateral L2 corpectomy and L1-3 posterior fusion  - Preop complete, pt marked, consents on chart  - Urinary retention  - Ortho: WBAT for +/- pubic ramus fracture. Navicular fracture splinted, no surgical intervention warranted.  - General surgery following, no evidence for abd injury on CT & exam without concern for abd injury  - Hospital medicine cleared patient for surgery            Please feel free to call with any further questions    Michelle Guerrero PA-C  Neurosurgery  [phone]

## 2017-10-05 PROBLEM — T14.90XA TRAUMA: Status: ACTIVE | Noted: 2017-10-05

## 2017-10-05 LAB
ANION GAP SERPL CALC-SCNC: 7 MMOL/L
BACTERIA #/AREA URNS AUTO: ABNORMAL /HPF
BASOPHILS # BLD AUTO: 0.01 K/UL
BASOPHILS NFR BLD: 0.1 %
BILIRUB UR QL STRIP: NEGATIVE
BUN SERPL-MCNC: 9 MG/DL
CALCIUM SERPL-MCNC: 7.9 MG/DL
CHLORIDE SERPL-SCNC: 105 MMOL/L
CLARITY UR REFRACT.AUTO: CLEAR
CO2 SERPL-SCNC: 25 MMOL/L
COLOR UR AUTO: YELLOW
CREAT SERPL-MCNC: 0.7 MG/DL
DIFFERENTIAL METHOD: NORMAL
EOSINOPHIL # BLD AUTO: 0 K/UL
EOSINOPHIL NFR BLD: 0.2 %
ERYTHROCYTE [DISTWIDTH] IN BLOOD BY AUTOMATED COUNT: 13.9 %
EST. GFR  (AFRICAN AMERICAN): >60 ML/MIN/1.73 M^2
EST. GFR  (NON AFRICAN AMERICAN): >60 ML/MIN/1.73 M^2
GLUCOSE SERPL-MCNC: 73 MG/DL
GLUCOSE UR QL STRIP: NEGATIVE
HCT VFR BLD AUTO: 37.7 %
HGB BLD-MCNC: 12.7 G/DL
HGB UR QL STRIP: ABNORMAL
KETONES UR QL STRIP: ABNORMAL
LEUKOCYTE ESTERASE UR QL STRIP: NEGATIVE
LYMPHOCYTES # BLD AUTO: 1.9 K/UL
LYMPHOCYTES NFR BLD: 18.9 %
MCH RBC QN AUTO: 30.2 PG
MCHC RBC AUTO-ENTMCNC: 33.7 G/DL
MCV RBC AUTO: 90 FL
MICROSCOPIC COMMENT: ABNORMAL
MONOCYTES # BLD AUTO: 0.8 K/UL
MONOCYTES NFR BLD: 7.8 %
NEUTROPHILS # BLD AUTO: 7.1 K/UL
NEUTROPHILS NFR BLD: 72.7 %
NITRITE UR QL STRIP: NEGATIVE
PH UR STRIP: 5 [PH] (ref 5–8)
PLATELET # BLD AUTO: 266 K/UL
PMV BLD AUTO: 9.2 FL
POTASSIUM SERPL-SCNC: 3.5 MMOL/L
PROT UR QL STRIP: NEGATIVE
RBC # BLD AUTO: 4.21 M/UL
RBC #/AREA URNS AUTO: 5 /HPF (ref 0–4)
SODIUM SERPL-SCNC: 137 MMOL/L
SP GR UR STRIP: 1.01 (ref 1–1.03)
SQUAMOUS #/AREA URNS AUTO: 0 /HPF
URN SPEC COLLECT METH UR: ABNORMAL
UROBILINOGEN UR STRIP-ACNC: NEGATIVE EU/DL
WBC # BLD AUTO: 9.77 K/UL
WBC #/AREA URNS AUTO: 1 /HPF (ref 0–5)

## 2017-10-05 PROCEDURE — 85025 COMPLETE CBC W/AUTO DIFF WBC: CPT

## 2017-10-05 PROCEDURE — 25000003 PHARM REV CODE 250: Performed by: NEUROLOGICAL SURGERY

## 2017-10-05 PROCEDURE — 25000003 PHARM REV CODE 250: Performed by: STUDENT IN AN ORGANIZED HEALTH CARE EDUCATION/TRAINING PROGRAM

## 2017-10-05 PROCEDURE — 63600175 PHARM REV CODE 636 W HCPCS: Performed by: NEUROLOGICAL SURGERY

## 2017-10-05 PROCEDURE — 94799 UNLISTED PULMONARY SVC/PX: CPT

## 2017-10-05 PROCEDURE — 20600001 HC STEP DOWN PRIVATE ROOM

## 2017-10-05 PROCEDURE — 81001 URINALYSIS AUTO W/SCOPE: CPT

## 2017-10-05 PROCEDURE — 80048 BASIC METABOLIC PNL TOTAL CA: CPT

## 2017-10-05 PROCEDURE — 25000003 PHARM REV CODE 250: Performed by: PHYSICIAN ASSISTANT

## 2017-10-05 PROCEDURE — 97165 OT EVAL LOW COMPLEX 30 MIN: CPT

## 2017-10-05 PROCEDURE — 97161 PT EVAL LOW COMPLEX 20 MIN: CPT

## 2017-10-05 PROCEDURE — 63600175 PHARM REV CODE 636 W HCPCS: Performed by: STUDENT IN AN ORGANIZED HEALTH CARE EDUCATION/TRAINING PROGRAM

## 2017-10-05 RX ORDER — CALCIUM CARBONATE 500(1250)
500 TABLET ORAL DAILY
Status: DISCONTINUED | OUTPATIENT
Start: 2017-10-05 | End: 2017-10-10 | Stop reason: HOSPADM

## 2017-10-05 RX ADMIN — HYDROMORPHONE HYDROCHLORIDE 0.5 MG: 1 INJECTION, SOLUTION INTRAMUSCULAR; INTRAVENOUS; SUBCUTANEOUS at 06:10

## 2017-10-05 RX ADMIN — CEFAZOLIN SODIUM 2 G: 2 SOLUTION INTRAVENOUS at 08:10

## 2017-10-05 RX ADMIN — HYDROMORPHONE HYDROCHLORIDE 0.5 MG: 1 INJECTION, SOLUTION INTRAMUSCULAR; INTRAVENOUS; SUBCUTANEOUS at 12:10

## 2017-10-05 RX ADMIN — CEFAZOLIN SODIUM 1 G: 1 INJECTION, POWDER, FOR SOLUTION INTRAMUSCULAR; INTRAVENOUS at 03:10

## 2017-10-05 RX ADMIN — CEFAZOLIN SODIUM 2 G: 2 SOLUTION INTRAVENOUS at 12:10

## 2017-10-05 RX ADMIN — DIAZEPAM 5 MG: 5 TABLET ORAL at 04:10

## 2017-10-05 RX ADMIN — OXYCODONE HYDROCHLORIDE AND ACETAMINOPHEN 1 TABLET: 5; 325 TABLET ORAL at 11:10

## 2017-10-05 RX ADMIN — CLONAZEPAM 2 MG: 1 TABLET ORAL at 09:10

## 2017-10-05 RX ADMIN — OXYCODONE HYDROCHLORIDE AND ACETAMINOPHEN 1 TABLET: 5; 325 TABLET ORAL at 04:10

## 2017-10-05 RX ADMIN — HYDROMORPHONE HYDROCHLORIDE 0.5 MG: 1 INJECTION, SOLUTION INTRAMUSCULAR; INTRAVENOUS; SUBCUTANEOUS at 05:10

## 2017-10-05 RX ADMIN — BISACODYL 10 MG: 10 SUPPOSITORY RECTAL at 11:10

## 2017-10-05 RX ADMIN — OXYCODONE HYDROCHLORIDE AND ACETAMINOPHEN 1 TABLET: 5; 325 TABLET ORAL at 09:10

## 2017-10-05 RX ADMIN — CALCIUM 500 MG: 500 TABLET ORAL at 11:10

## 2017-10-05 NOTE — ASSESSMENT & PLAN NOTE
47 y.o. female s/p 30-ft fall from tree with L2 burst fracture with central retropulsion, now s/p lateral L2 corpectomy and L1-3 posterior fusion.  - LSO brace when standing or OOB.  - ADAT. DC fluids.  - Urinary retention: Hastings removed this AM. Required straight cath x 1 for retention. 600cc removed.  - Ortho: WBAT for +/- pubic ramus fracture. Navicular fracture splinted, no surgical intervention warranted.  - General surgery following, no evidence for abd injury on CT & exam without concern for abd injury  - Pending rehab.

## 2017-10-05 NOTE — PLAN OF CARE
Problem: Patient Care Overview  Goal: Plan of Care Review  Poc reviewed with pt. Including, safety precautions to reduce risk of falls, measures to reduce risk of pressure ulcers, and infection risk, and pain management.  Pt. Verbalized understanding.  Bed in low pos., bed alarm set, and call bell in reach.  Will cont. To monitor.

## 2017-10-05 NOTE — PROGRESS NOTES
[facility]  Neurosurgery  Progress Note    Subjective:     History of Present Illness: Candy Blakely is a 47 y.o. female PMH substance abuse who presents s/p approximate 30-foot fall from a tree while intoxicated today. Pt presented with complaints of low back and pelvic pain. Plain imaging revealed fractures of the right inferior pubic ramus, right navicula, and severe burst fracture of L2. CT revealed aforementioned burst fracture with centrally displaced bone fragments and severe cord compression. Neurosurgery was consulted for evaluation.    Post-Op Info:  Procedure(s) (LRB):  DIRECT LATERAL INTERBODY FUSION/Lateral L2 corpectomy with L1-3 fusion (N/A)   1 Day Post-Op     Interval History: Ms. Blakely complains of severe back pain today.  She had urinary retention overnight and a Hastings was placed.  She denies any LE weakness or paresthesias or radicular pain.  Left and right drain outputs are 80 and 55 overnight, respectively.    Medications:  Continuous Infusions:     Scheduled Meds:   bisacodyl  10 mg Rectal Daily    calcium carbonate  500 mg Oral Daily    ceFAZolin (ANCEF) IVPB  1 g Intravenous Q12H    clonazePAM  2 mg Oral QHS     PRN Meds:acetaminophen, aluminum-magnesium hydroxide-simethicone, dextrose 50%, dextrose 50%, diazePAM, glucagon (human recombinant), glucose, glucose, glucose, HYDROmorphone, labetalol, ondansetron, oxycodone-acetaminophen, promethazine (PHENERGAN) IVPB, senna-docusate 8.6-50 mg     Review of Systems    Objective:     Weight: 54.6 kg (120 lb 4.8 oz)  Body mass index is 22.73 kg/m².  Vital Signs (Most Recent):  Temp: 99.9 °F (37.7 °C) (10/05/17 0800)  Pulse: 110 (10/05/17 1100)  Resp: 16 (10/05/17 0800)  BP: 123/74 (10/05/17 0351)  SpO2: 96 % (10/05/17 0800) Vital Signs (24h Range):  Temp:  [98 °F (36.7 °C)-99.9 °F (37.7 °C)] 99.9 °F (37.7 °C)  Pulse:  [] 110  Resp:  [14-20] 16  SpO2:  [93 %-100 %] 96 %  BP: (114-138)/(66-84) 123/74       Date  "10/05/17 0700 - 10/06/17 0659   Shift 6000-6480 8209-1091 6884-5289 24 Hour Total   I  N  T  A  K  E   Shift Total  (mL/kg)       O  U  T  P  U  T   Drains 5   5    Shift Total  (mL/kg) 5  (0.1)   5  (0.1)   Weight (kg) 54.6 54.6 54.6 54.6                        Urethral Catheter 10/04/17 0910 Non-latex 16 Fr. (Active)   Site Assessment Clean;Intact 10/4/2017 11:57 AM   Collection Container Leg bag 10/4/2017 11:57 AM   Securement Method secured to top of thigh w/ adhesive device 10/4/2017 11:57 AM   Catheter Care Performed yes 10/4/2017  9:10 AM   Reason for Continuing Urinary Catheterization Urinary retention 10/4/2017  9:10 AM   CAUTI Prevention Bundle StatLock in place w 1" slack;Intact seal between catheter & drainage tubing;Drainage bag off the floor;Green sheeting clip in use;No dependent loops or kinks;Drainage bag not overfilled (<2/3 full);Drainage bag below bladder 10/4/2017  9:10 AM   Output (mL) 575 mL 10/4/2017  9:10 AM       Neurosurgery Physical Exam     General: well developed, well nourished, no distress  Head: normocephalic, atraumatic  Neurologic: Alert and oriented. Thought content appropriate  GCS: Motor: 6/Verbal: 5/Eyes: 4 GCS Total: 15  Mental Status: Awake, Alert, Oriented x 4  Language: No aphasia  Speech: No dysarthria  Cranial nerves: face symmetric, tongue midline, CN II-XII grossly intact.   Eyes: pupils equal, round, reactive to light with accommodation, EOMI  Pulmonary: normal respirations, not labored, no accessory muscles used  Abdomen: soft, non-distended, not tender to palpation  Sensory: intact to light touch throughout  Motor Strength: Moves all extremities spontaneously with good tone.   No abnormal movements seen.     Strength  Deltoids Triceps Biceps Wrist Extension Wrist Flexion Hand    Upper: R 5/5 5/5 5/5 5/5 5/5 5/5    L 5/5 5/5 5/5 5/5 5/5 5/5     Iliopsoas Quadriceps Knee  Flexion Tibialis  anterior Gastro- cnemius EHL   Lower: R 3/5 3/5 5/5 N/A N/A N/A    L 5/5 5/5 " 5/5 5/5 5/5 5/5     Unable to fully examine RLE due to large splint to knee, can wiggle toes & at least 3/5 HF  Pronator Drift: no drift noted  Finger-to-nose: Intact bilaterally  Lord: absent  Clonus: absent on left, unable to assess on right due to R splint  Babinski: absent on left, unable to assess on right due to R splint  Pulses: 2+ and symmetric radial and dorsalis pedis  Skin: warm, dry and intact, no rashes    Significant Labs:    Recent Labs  Lab 10/03/17  1500 10/04/17  0425 10/05/17  0358   * 105 73    137 137   K 3.9 4.0 3.5    103 105   CO2 22* 27 25   BUN 13 14 9   CREATININE 0.8 0.8 0.7   CALCIUM 9.8 9.2 7.9*       Recent Labs  Lab 10/04/17  0425  10/04/17  1849 10/04/17  2052 10/05/17  0358   WBC 10.11  --   --  12.38 9.77   HGB 12.5  --   --  12.5 12.7   HCT 37.3  < > 26* 37.1 37.7   *  --   --  268 266   < > = values in this interval not displayed.    Recent Labs  Lab 10/03/17  1500   INR 1.0   APTT 22.1     Microbiology Results (last 7 days)     ** No results found for the last 168 hours. **          Significant Diagnostics:  I personally reviewed MRI Lumbar spine & agree with findings: Acute compression/burst fracture of the L2 vertebral body with extension into the left pedicle as detailed above. Retropulsion of fracture fragments with mass effect upon the thecal sac resulting in severe spinal canal stenosis at this level. Small amount of anterior epidural hemorrhage.    Nondisplaced fracture involving the L1 vertebral body, without significant height loss or retropulsion of fracture fragments.     Assessment/Plan:     * Burst fracture of lumbar vertebra    47 y.o. female s/p 30-ft fall from tree with L2 burst fracture with central retropulsion, now s/p lateral L2 corpectomy and L1-3 posterior fusion.  - LSO brace when standing or OOB.  - ADAT. DC fluids.  - Urinary retention: Hastings removed this AM. Required straight cath x 1 for retention. 600cc removed.  - Ortho:  WBAT for +/- pubic ramus fracture. Navicular fracture splinted, no surgical intervention warranted.  - General surgery following, no evidence for abd injury on CT & exam without concern for abd injury  - Pending rehab.              Nancy Manning PA-C  Neurosurgery  [facility]

## 2017-10-05 NOTE — SUBJECTIVE & OBJECTIVE
"Interval History: Ms. Blakely complains of severe back pain today.  She had urinary retention overnight and a Hastings was placed.  She denies any LE weakness or paresthesias or radicular pain.      Medications:  Continuous Infusions:     Scheduled Meds:   bisacodyl  10 mg Rectal Daily    calcium carbonate  500 mg Oral Daily    ceFAZolin (ANCEF) IVPB  1 g Intravenous Q12H    clonazePAM  2 mg Oral QHS     PRN Meds:acetaminophen, aluminum-magnesium hydroxide-simethicone, dextrose 50%, dextrose 50%, diazePAM, glucagon (human recombinant), glucose, glucose, glucose, HYDROmorphone, labetalol, ondansetron, oxycodone-acetaminophen, promethazine (PHENERGAN) IVPB, senna-docusate 8.6-50 mg     Review of Systems    Objective:     Weight: 54.6 kg (120 lb 4.8 oz)  Body mass index is 22.73 kg/m².  Vital Signs (Most Recent):  Temp: 99.9 °F (37.7 °C) (10/05/17 0800)  Pulse: 110 (10/05/17 1100)  Resp: 16 (10/05/17 0800)  BP: 123/74 (10/05/17 0351)  SpO2: 96 % (10/05/17 0800) Vital Signs (24h Range):  Temp:  [98 °F (36.7 °C)-99.9 °F (37.7 °C)] 99.9 °F (37.7 °C)  Pulse:  [] 110  Resp:  [14-20] 16  SpO2:  [93 %-100 %] 96 %  BP: (114-138)/(66-84) 123/74       Date 10/05/17 0700 - 10/06/17 0659   Shift 8407-6629 1055-9795 2531-5341 24 Hour Total   I  N  T  A  K  E   Shift Total  (mL/kg)       O  U  T  P  U  T   Drains 5   5    Shift Total  (mL/kg) 5  (0.1)   5  (0.1)   Weight (kg) 54.6 54.6 54.6 54.6                        Urethral Catheter 10/04/17 0910 Non-latex 16 Fr. (Active)   Site Assessment Clean;Intact 10/4/2017 11:57 AM   Collection Container Leg bag 10/4/2017 11:57 AM   Securement Method secured to top of thigh w/ adhesive device 10/4/2017 11:57 AM   Catheter Care Performed yes 10/4/2017  9:10 AM   Reason for Continuing Urinary Catheterization Urinary retention 10/4/2017  9:10 AM   CAUTI Prevention Bundle StatLock in place w 1" slack;Intact seal between catheter & drainage tubing;Drainage bag off the floor;Green sheeting " clip in use;No dependent loops or kinks;Drainage bag not overfilled (<2/3 full);Drainage bag below bladder 10/4/2017  9:10 AM   Output (mL) 575 mL 10/4/2017  9:10 AM       Neurosurgery Physical Exam     General: well developed, well nourished, no distress  Head: normocephalic, atraumatic  Neurologic: Alert and oriented. Thought content appropriate  GCS: Motor: 6/Verbal: 5/Eyes: 4 GCS Total: 15  Mental Status: Awake, Alert, Oriented x 4  Language: No aphasia  Speech: No dysarthria  Cranial nerves: face symmetric, tongue midline, CN II-XII grossly intact.   Eyes: pupils equal, round, reactive to light with accommodation, EOMI  Pulmonary: normal respirations, not labored, no accessory muscles used  Abdomen: soft, non-distended, not tender to palpation  Sensory: intact to light touch throughout  Motor Strength: Moves all extremities spontaneously with good tone.   No abnormal movements seen.     Strength  Deltoids Triceps Biceps Wrist Extension Wrist Flexion Hand    Upper: R 5/5 5/5 5/5 5/5 5/5 5/5    L 5/5 5/5 5/5 5/5 5/5 5/5     Iliopsoas Quadriceps Knee  Flexion Tibialis  anterior Gastro- cnemius EHL   Lower: R 3/5 3/5 5/5 N/A N/A N/A    L 5/5 5/5 5/5 5/5 5/5 5/5     Unable to fully examine RLE due to large splint to knee, can wiggle toes & at least 3/5 HF  Pronator Drift: no drift noted  Finger-to-nose: Intact bilaterally  Lord: absent  Clonus: absent on left, unable to assess on right due to R splint  Babinski: absent on left, unable to assess on right due to R splint  Pulses: 2+ and symmetric radial and dorsalis pedis  Skin: warm, dry and intact, no rashes    Significant Labs:    Recent Labs  Lab 10/03/17  1500 10/04/17  0425 10/05/17  0358   * 105 73    137 137   K 3.9 4.0 3.5    103 105   CO2 22* 27 25   BUN 13 14 9   CREATININE 0.8 0.8 0.7   CALCIUM 9.8 9.2 7.9*       Recent Labs  Lab 10/04/17  0425  10/04/17  1849 10/04/17  2052 10/05/17  0358   WBC 10.11  --   --  12.38 9.77   HGB  12.5  --   --  12.5 12.7   HCT 37.3  < > 26* 37.1 37.7   *  --   --  268 266   < > = values in this interval not displayed.    Recent Labs  Lab 10/03/17  1500   INR 1.0   APTT 22.1     Microbiology Results (last 7 days)     ** No results found for the last 168 hours. **          Significant Diagnostics:  I personally reviewed MRI Lumbar spine & agree with findings: Acute compression/burst fracture of the L2 vertebral body with extension into the left pedicle as detailed above. Retropulsion of fracture fragments with mass effect upon the thecal sac resulting in severe spinal canal stenosis at this level. Small amount of anterior epidural hemorrhage.    Nondisplaced fracture involving the L1 vertebral body, without significant height loss or retropulsion of fracture fragments.

## 2017-10-05 NOTE — TRANSFER OF CARE
"Anesthesia Transfer of Care Note    Patient: Candy Blakely    Procedure(s) Performed: Procedure(s) (LRB):  DIRECT LATERAL INTERBODY FUSION/Lateral L2 corpectomy with L1-3 fusion (N/A)    Patient location: PACU    Anesthesia Type: general    Transport from OR: Transported from OR on 6-10 L/min O2 by face mask with adequate spontaneous ventilation    Post pain: adequate analgesia    Post assessment: no apparent anesthetic complications and tolerated procedure well    Post vital signs: stable    Level of consciousness: sedated    Nausea/Vomiting: no nausea/vomiting    Complications: none    Transfer of care protocol was followed      Last vitals:   Visit Vitals  /67   Pulse 96   Temp 37.3 °C (99.1 °F) (Axillary)   Resp 18   Ht 5' 1" (1.549 m)   Wt 54.6 kg (120 lb 4.8 oz)   LMP 09/20/2017   SpO2 100%   Breastfeeding? No   BMI 22.73 kg/m²     "

## 2017-10-05 NOTE — ANESTHESIA POSTPROCEDURE EVALUATION
"Anesthesia Post Evaluation    Patient: Candy Blakely    Procedure(s) Performed: Procedure(s) (LRB):  DIRECT LATERAL INTERBODY FUSION/Lateral L2 corpectomy with L1-3 fusion (N/A)    Final Anesthesia Type: general  Patient location during evaluation: PACU  Patient participation: Yes- Able to Participate  Level of consciousness: awake and alert  Post-procedure vital signs: reviewed and stable  Pain management: adequate  Airway patency: patent  PONV status at discharge: No PONV  Anesthetic complications: no      Cardiovascular status: hemodynamically stable  Respiratory status: unassisted  Hydration status: euvolemic  Follow-up not needed.        Visit Vitals  /74 (BP Location: Left arm, Patient Position: Lying)   Pulse 108   Temp 37.6 °C (99.7 °F) (Oral)   Resp 14   Ht 5' 1" (1.549 m)   Wt 54.6 kg (120 lb 4.8 oz)   LMP 09/20/2017   SpO2 100%   Breastfeeding? No   BMI 22.73 kg/m²       Pain/Miguel Score: Pain Assessment Performed: Yes (10/5/2017  2:00 AM)  Presence of Pain: complains of pain/discomfort (10/5/2017  4:00 AM)  Pain Rating Prior to Med Admin: 8 (10/5/2017  4:05 AM)  Pain Rating Post Med Admin: 0 (10/4/2017  5:04 AM)  Miguel Score: 9 (10/4/2017  8:17 PM)      "

## 2017-10-05 NOTE — PLAN OF CARE
Problem: Physical Therapy Goal  Goal: Physical Therapy Goal  Goals to be met by: 10/15/2017     Patient will increase functional independence with mobility by performin. Supine to sit with Stand-by Assistance  2. Sit to supine with Stand-by Assistance  3. Sit to stand transfer with Contact Guard Assistance  4. Bed to chair transfer with Contact Guard Assistance  5. Gait  x 10 feet with Minimal Assistance using Rolling Walker.   6. Lower extremity exercise program x15 reps per handout, with independence  7. Propel w/c with B UE for 200ft Supervision.     Outcome: Ongoing (interventions implemented as appropriate)  Pt evaluation complete.    TRACE SEYMOUR, PT  10/5/2017

## 2017-10-05 NOTE — PLAN OF CARE
Problem: Patient Care Overview  Goal: Plan of Care Review  Outcome: Ongoing (interventions implemented as appropriate)  POC reviewed with the patient at 2230. Verbalized understanding. Fall precautions and safety maintained. No falls/injury this shift. No new skin impairments noted. Remains with incision to left back, dressing in place. Drains emptied Q 4 hr. AAOx4. Afebrile. VSS. Cardiac monitoring and IVF @ 100 ml/hr continued. Hastings to gravity. Required assistance with pain management overnight, PRN medication given. Pt progressing toward goals. No cognitive/physical changes noted overnight. Will continue to monitor.

## 2017-10-05 NOTE — NURSING TRANSFER
Nursing Transfer Note      10/4/2017     Transfer To: CT then 721    Transfer via stretcher    Transfer with IV fluids, cardiac monitor per centralized monitoring, back brace, marsh.    Transported by [email or web address]    Medicines sent: none    Chart send with patient: Yes    Notified: boyfriend Socrates Morales    Patient reassessed at: 10/4/2017  2117 (date, time)

## 2017-10-05 NOTE — PT/OT/SLP EVAL
Occupational Therapy  Evaluation    Candy Blakely   MRN: [id number]   Admitting Diagnosis: Burst fracture of lumbar vertebra    OT Date of Treatment: 10/05/17   OT Start Time: 0908  OT Stop Time: 0927  OT Total Time (min): 19 min    Billable Minutes:  Evaluation 19    Diagnosis: Burst fracture of lumbar vertebra (s/p fall)  S/p L2 corpectomy; L1-3 Fusion; R fracture to calcaneus and navicular     Past Medical History:   Diagnosis Date    ADHD (attention deficit hyperactivity disorder)     Anxiety       Past Surgical History:   Procedure Laterality Date    TUBAL LIGATION  2003       Referring physician: Roselyn  Date referred to OT: 10/4/2017    General Precautions: Standard, fall  Orthopedic Precautions: RLE non weight bearing  Braces: LSO; R LE cast    Patient History:  Living Environment  Living Environment Comment: Pt reported that she lives with her mother in 1  with 3 CIARAN and L hand rail. Tub/shower combo. Pt is not working. She reported mother would be able to assist upon d/c.   Equipment Currently Used at Home: none    Prior level of function:   Bed Mobility/Transfers: independent  Grooming: independent  Bathing: independent  Upper Body Dressing: independent  Lower Body Dressing: independent  Toileting: independent  Home Management Skills: independent  Homemaking Responsibilities: No  Occupation: Unemployed   Roles: Daughter, girlfriend, home and community dweller  Dominant hand: right    Subjective:  Communicated with RN prior to session.  Pt agreeable to therapy evaluation. Completed with PT  Chief Complaint: pain  Patient/Family stated goals: no reports    Pain/Comfort  Pain Rating 1: 6/10  Location 1: back  Pain Addressed 1: Reposition, Distraction, Pre-medicate for activity  Pain Rating Post-Intervention 1: 6/10    Objective:  Patient found with: peripheral IV, JANNETTE drain, telemetry, hemovac    Cognitive Exam:  Oriented to: Person, Place, Time and Situation  Follows Commands/attention: Follows  multistep  commands  Communication: clear/fluent  Memory:  No Deficits noted  Safety awareness/insight to disability: intact  Coping skills/emotional control: Appropriate to situation    Visual/perceptual:  Intact    Physical Exam:  Postural examination/scapula alignment: Rounded shoulder  Skin integrity: Visible skin intact  Edema: None noted     Sensation:   Intact B UE    Upper Extremity Range of Motion:  Right Upper Extremity: WFL  Left Upper Extremity: WFL    Upper Extremity Strength:  Right Upper Extremity: WFL  Left Upper Extremity: WFL   Strength: WFL    Fine motor coordination:   Intact    Gross motor coordination: WFL for B UE    Functional Mobility:  Bed Mobility:  Rolling/Turning to Left: Minimum assistance, With side rail  Supine to Sit: Minimum Assistance, WIth side rail    Transfers:  Bed <> Chair Technique: Stand Pivot  Bed <> Chair Transfer Assistance: Minimum Assistance (to bedside chair)  Bed <> Chair Assistive Device: No Assistive Device    *Completed stand pivot to stretcher with moderate assistance; required added assistance for NWB to R LE    Activities of Daily Living:  Feeding Level of Assistance: Activity did not occur  UE Dressing Level of Assistance: Minimum assistance (to don gown as jacket while seated)  LE Dressing Level of Assistance: Maximum assistance (don L sock)    Balance:   Dynamic Sit:CGA 2/2 pain    Additional:  -Pt alert and agreeable to therapy eval; edu on OT role in care  -Discussed spinal precautions using BLT method; edu on log rolling technique with tactile cues for enforce with task  -Edu pt/significant other for donning LSO while seated  -Communication board updated    *Discussed with RN assist level for EOB and LSO donning; discussed to encourage pt up to EOB for all meals    AM-PAC 6 CLICK ADL  How much help from another person does this patient currently need?  1 = Unable, Total/Dependent Assistance  2 = A lot, Maximum/Moderate Assistance  3 = A little,  "Minimum/Contact Guard/Supervision  4 = None, Modified [place]/Independent    Putting on and taking off regular lower body clothing? : 2  Bathing (including washing, rinsing, drying)?: 2  Toileting, which includes using toilet, bedpan, or urinal? : 3  Putting on and taking off regular upper body clothing?: 3  Taking care of personal grooming such as brushing teeth?: 3  Eating meals?: 4  Total Score: 17    AM-PAC Raw Score CMS "G-Code Modifier Level of Impairment Assistance   6 % Total / Unable   7 - 9 CM 80 - 100% Maximal Assist   10-14 CL 60 - 80% Moderate Assist   15 - 19 CK 40 - 60% Moderate Assist   20 - 22 CJ 20 - 40% Minimal Assist   23 CI 1-20% SBA / CGA   24 CH 0% Independent/ Mod I       Patient left on stretcher with all lines intact and RN notified    Assessment:  Candy Epstein) is a 47 y.o. female with a medical diagnosis of Burst fracture of lumbar vertebra and presents s/p fall. Pt  S/p L2 corpectomy; L1-3 Fusion. Moreover, pt also R fracture to calcaneus and navicular. She demo pain limiting further OOB assessment on this date. She demo overall decline in her safety with functional mobility, bed mobility and all previous level of functional tasks and activities. She will cont to benefit from skilled OT services for best functional outcome.     Rehab identified problem list/impairments: Rehab identified problem list/impairments: impaired endurance, impaired functional mobilty, gait instability, impaired balance, decreased lower extremity function, decreased safety awareness, pain, impaired self care skills, orthopedic precautions    Rehab potential is good.    Activity tolerance: Fair    Discharge recommendations: Discharge Facility/Level Of Care Needs: rehabilitation facility     Barriers to discharge: Barriers to Discharge: Decreased caregiver support, Inaccessible home environment    Equipment recommendations: wheelchair, bedside commode     GOALS:    Occupational Therapy Goals  "       Problem: Occupational Therapy Goal    Goal Priority Disciplines Outcome Interventions   Occupational Therapy Goal     OT, PT/OT Ongoing (interventions implemented as appropriate)    Description:  Goals to be met by: 10/12     Patient will increase functional independence with ADLs by performing:    Pt/CG demo understanding for spinal precautions with bed mobility.   Supine to sit with Stand-by Assistance.  Donning LSO while seated EOB with Set-up Assistance and Minimal Assistance.  LE Dressing with Minimal Assistance and Assistive Devices as needed.  Stand pivot to chair with CGA while maintaining R LE NWB status.   Grooming while standing at sink with Minimal Assistance while maintaining R LE NWB status.                      PLAN:  Patient to be seen 4 x/week to address the above listed problems via self-care/home management, therapeutic activities, therapeutic exercises, neuromuscular re-education  Plan of Care expires: 11/03/17  Plan of Care reviewed with: patient, significant other    JOÃO Yuen  10/05/2017

## 2017-10-05 NOTE — PT/OT/SLP EVAL
Physical Therapy  Evaluation    Candy Blakely   MRN: [id number]   Admitting Diagnosis: Burst fracture of lumbar vertebra    PT Received On: 10/05/17  PT Start Time: 0908     PT Stop Time: 0927    PT Total Time (min): 19 min       Billable Minutes:  Evaluation 19    Diagnosis: Burst fracture of lumbar vertebra, R navicular and calcaneal fx  S/p DIRECT LATERAL INTERBODY FUSION/Lateral L2 corpectomy with L1-3 fusion (N/A)    History: personal factors and/or comorbidities that impact the plan of care: anxiety  Evaluation of Body Systems: cardiovascular/pulmonary, integumentary, musculoskeletal, neuromuscular, cognition/communication  Functional Outcome Tools: AMPAC, ROM, MMT  Clinical Presentation: stable    Evaluation Complexity Level: Low    Past Medical History:   Diagnosis Date    ADHD (attention deficit hyperactivity disorder)     Anxiety       Past Surgical History:   Procedure Laterality Date    TUBAL LIGATION  2003       Referring physician: MELISSA Gasca  Date referred to PT: 10/04/2017    General Precautions: Standard, fall  Orthopedic Precautions: RLE non weight bearing (spinal)   Braces: LSO (R LE cast)            Patient History:  Lives With: parent(s)  Living Arrangements: house  Home Accessibility: stairs to enter home  Home Layout: Able to live on 1st floor  Number of Stairs to Enter Home: 3  Stair Railings at Home: outside, present on right side  Living Environment Comment: Pt lives with mother in 1-story house with 3 CIARAN. Pt reports (I) with ADLs and amb. Pt reports mother is able to provide 24hr A.   Equipment Currently Used at Home: none  DME owned (not currently used): none    Previous Level of Function:  Ambulation Skills: independent  Transfer Skills: independent  ADL Skills: independent  Work/Leisure Activity: independent    Subjective:  Communicated with RN prior to session.  Pt agreeable to therapy session.   Chief Complaint: back and R ankle pain  Patient goals: return to PLOF.      Pain/Comfort  Pain Rating 1: 6/10  Location - Side 1: Bilateral  Location - Orientation 1: generalized  Location 1: back  Pain Addressed 1: Reposition, Distraction  Pain Rating Post-Intervention 1: 6/10      Objective:   Patient found with: peripheral IV, hemovac, JANNETTE drain     Cognitive Exam:  Oriented to: Person, Place, Time and Situation    Follows Commands/attention: Follows multistep  commands  Communication: clear/fluent  Safety awareness/insight to disability: impaired    Physical Exam:  Postural examination/scapula alignment: Rounded shoulder    Skin integrity: Visible skin intact  Edema: None noted B LE, R LE cast    Sensation:   Intact  light/touch B LE    Lower Extremity Range of Motion:  Right Lower Extremity: WFL  Left Lower Extremity: WFL    Lower Extremity Strength:  Right Lower Extremity: WFL except R ankle unable to test  Left Lower Extremity: WFL     Gross motor coordination: WFL    Functional Mobility:  Bed Mobility:  Supine to Sit: Minimum Assistance   Sit Supine: Mod A    Transfers:  Bed <> Chair Technique: Squat Pivot  Bed <> Chair Assistance: Minimum Assistance, Moderate Assistance (min A EOB to bedside chair, max A bedside chair to stretcher )  Bed <> Chair Assistive Device: No Assistive Device    Gait:   Gait Distance: unable to perform    Balance:   Static Sit: GOOD-: Takes MODERATE challenges from all directions but inconsistently  Dynamic Sit: GOOD-: Maintains balance through MODERATE excursions of active trunk movement,     Static Stand: 0: Needs MAXIMAL assist to maintain   Dynamic stand: 0: N/A    Therapeutic Activities and Exercises:  Pt educated on role of PT/POC.  Pt educated on spinal precautions and R LE NWB.   Pt safe to perform transfers with RN staff.     AM-PAC 6 CLICK MOBILITY  How much help from another person does this patient currently need?   1 = Unable, Total/Dependent Assistance  2 = A lot, Maximum/Moderate Assistance  3 = A little, Minimum/Contact  Guard/Supervision  4 = None, Modified [place]/Independent    Turning over in bed (including adjusting bedclothes, sheets and blankets)?: 3  Sitting down on and standing up from a chair with arms (e.g., wheelchair, bedside commode, etc.): 2  Moving from lying on back to sitting on the side of the bed?: 3  Moving to and from a bed to a chair (including a wheelchair)?: 2  Need to walk in hospital room?: 1  Climbing 3-5 steps with a railing?: 1  Total Score: 12     AM-PAC Raw Score CMS G-Code Modifier Level of Impairment Assistance   6 % Total / Unable   7 - 9 CM 80 - 100% Maximal Assist   10 - 14 CL 60 - 80% Moderate Assist   15 - 19 CK 40 - 60% Moderate Assist   20 - 22 CJ 20 - 40% Minimal Assist   23 CI 1-20% SBA / CGA   24 CH 0% Independent/ Mod I     Patient left on stretcher with all lines intact and transport and significant other present.    Assessment:   Candy Blakely is a 47 y.o. female with a medical diagnosis of Burst fracture of lumbar vertebra and presents with increased pain and decreased strength, endurance, balance and overall functional mobility. Pt performed bed mobility min/mod A and SPT min/max A. Pt will benefit from skilled PT to improve deficits and increase overall functional mobility. Pt will benefit from IP Rehab to return to PLOF.     Rehab identified problem list/impairments: Rehab identified problem list/impairments: weakness, impaired functional mobilty, decreased safety awareness, gait instability, impaired endurance, impaired balance, decreased lower extremity function, orthopedic precautions, pain    Rehab potential is good.    Activity tolerance: Good    Discharge recommendations: Discharge Facility/Level Of Care Needs: rehabilitation facility     Barriers to discharge: Barriers to Discharge: Decreased caregiver support, Inaccessible home environment    Equipment recommendations: Equipment Needed After Discharge: other (see comments) (TBD)     GOALS:    Physical  Therapy Goals        Problem: Physical Therapy Goal    Goal Priority Disciplines Outcome Goal Variances Interventions   Physical Therapy Goal     PT/OT, PT Ongoing (interventions implemented as appropriate)     Description:  Goals to be met by: 10/15/2017     Patient will increase functional independence with mobility by performin. Supine to sit with Stand-by Assistance  2. Sit to supine with Stand-by Assistance  3. Sit to stand transfer with Contact Guard Assistance  4. Bed to chair transfer with Contact Guard Assistance  5. Gait  x 10 feet with Minimal Assistance using Rolling Walker.   6. Lower extremity exercise program x15 reps per handout, with independence  7. Propel w/c with B UE for 200ft Supervision.                       PLAN:    Patient to be seen 5 x/week to address the above listed problems via gait training, therapeutic activities, therapeutic exercises, neuromuscular re-education, wheelchair management/training  Plan of Care expires: 17  Plan of Care reviewed with: patient, significant other          TRACE SEYMOUR, PT  10/05/2017

## 2017-10-05 NOTE — PLAN OF CARE
Problem: Occupational Therapy Goal  Goal: Occupational Therapy Goal  Goals to be met by: 10/12     Patient will increase functional independence with ADLs by performing:    Pt/CG demo understanding for spinal precautions with bed mobility.   Supine to sit with Stand-by Assistance.  Donning LSO while seated EOB with Set-up Assistance and Minimal Assistance.  LE Dressing with Minimal Assistance and Assistive Devices as needed.  Stand pivot to chair with CGA while maintaining R LE NWB status.   Grooming while standing at sink with Minimal Assistance while maintaining R LE NWB status.    Outcome: Ongoing (interventions implemented as appropriate)  OT henrry completed. JOÃO Yuen 10/5/2017

## 2017-10-05 NOTE — BRIEF OP NOTE
[facility]  Neurosurgery  Operative Note    SUMMARY      Date of Procedure: 10/4/2017     Procedure: Procedure(s) (LRB):  DIRECT LATERAL INTERBODY FUSION/Lateral L2 corpectomy with L1-3 fusion (N/A)     Surgeon(s) and Role:     * Toni Gasca MD - Primary    Assisting Surgeon: Westley Salgado MD    Pre-Operative Diagnosis: Traumatic compression fracture of L2 lumbar vertebra, closed, initial encounter [S32.020A]    Post-Operative Diagnosis: Post-Op Diagnosis Codes:     * Traumatic compression fracture of L2 lumbar vertebra, closed, initial encounter [S32.020A]    Anesthesia: General    Technical Procedures Used: L2 lateral corpectomy and interbody fusion    Description of the Findings of the Procedure: see full op note    Complications: No    Estimated Blood Loss (EBL): 900 mL           Specimens:   Specimen (12h ago through future)    None           Implants:   Implant Name Type Inv. Item Serial No.  Lot No. LRB No. Used   PUTTY ACTIFUSE ABX 10ML - [id number]  PUTTY ACTIFUSE ABX 10ML  [employer] [id number] N/A 1   SPACER FORTIFY 16MM 36-57MM - [id number]  SPACER FORTIFY 16MM 36-57MM  [employer]  N/A 1   fortify 16 upper [id number]      N/A 1   fortify 16 lower endcap 35mm      N/A 1   truss plate 52mm      N/A 1   truss screws 5.5 x 36mm           N/A 4              Condition: Good    Disposition: PACU - hemodynamically stable.

## 2017-10-06 LAB
ALBUMIN SERPL BCP-MCNC: 2.1 G/DL
ALP SERPL-CCNC: 87 U/L
ALT SERPL W/O P-5'-P-CCNC: 12 U/L
ANION GAP SERPL CALC-SCNC: 8 MMOL/L
ANION GAP SERPL CALC-SCNC: 8 MMOL/L
APTT BLDCRRT: 28.1 SEC
AST SERPL-CCNC: 21 U/L
BACTERIA #/AREA URNS AUTO: NORMAL /HPF
BASOPHILS # BLD AUTO: 0.01 K/UL
BASOPHILS # BLD AUTO: 0.02 K/UL
BASOPHILS NFR BLD: 0.1 %
BASOPHILS NFR BLD: 0.1 %
BILIRUB SERPL-MCNC: 0.4 MG/DL
BILIRUB UR QL STRIP: NEGATIVE
BUN SERPL-MCNC: 6 MG/DL
BUN SERPL-MCNC: 7 MG/DL
CALCIUM SERPL-MCNC: 8.1 MG/DL
CALCIUM SERPL-MCNC: 8.5 MG/DL
CHLORIDE SERPL-SCNC: 101 MMOL/L
CHLORIDE SERPL-SCNC: 99 MMOL/L
CLARITY UR REFRACT.AUTO: ABNORMAL
CO2 SERPL-SCNC: 26 MMOL/L
CO2 SERPL-SCNC: 27 MMOL/L
COLOR UR AUTO: YELLOW
CREAT SERPL-MCNC: 0.6 MG/DL
CREAT SERPL-MCNC: 0.7 MG/DL
DIFFERENTIAL METHOD: ABNORMAL
DIFFERENTIAL METHOD: ABNORMAL
EOSINOPHIL # BLD AUTO: 0 K/UL
EOSINOPHIL # BLD AUTO: 0 K/UL
EOSINOPHIL NFR BLD: 0.1 %
EOSINOPHIL NFR BLD: 0.1 %
ERYTHROCYTE [DISTWIDTH] IN BLOOD BY AUTOMATED COUNT: 14.1 %
ERYTHROCYTE [DISTWIDTH] IN BLOOD BY AUTOMATED COUNT: 14.1 %
EST. GFR  (AFRICAN AMERICAN): >60 ML/MIN/1.73 M^2
EST. GFR  (AFRICAN AMERICAN): >60 ML/MIN/1.73 M^2
EST. GFR  (NON AFRICAN AMERICAN): >60 ML/MIN/1.73 M^2
EST. GFR  (NON AFRICAN AMERICAN): >60 ML/MIN/1.73 M^2
GLUCOSE SERPL-MCNC: 120 MG/DL
GLUCOSE SERPL-MCNC: 122 MG/DL
GLUCOSE UR QL STRIP: ABNORMAL
HCT VFR BLD AUTO: 33.3 %
HCT VFR BLD AUTO: 36 %
HGB BLD-MCNC: 11.4 G/DL
HGB BLD-MCNC: 12.6 G/DL
HGB UR QL STRIP: ABNORMAL
INR PPP: 1.1
KETONES UR QL STRIP: NEGATIVE
LACTATE SERPL-SCNC: 1.9 MMOL/L
LEUKOCYTE ESTERASE UR QL STRIP: NEGATIVE
LYMPHOCYTES # BLD AUTO: 1.9 K/UL
LYMPHOCYTES # BLD AUTO: 2 K/UL
LYMPHOCYTES NFR BLD: 11.8 %
LYMPHOCYTES NFR BLD: 12.7 %
MAGNESIUM SERPL-MCNC: 1.4 MG/DL
MCH RBC QN AUTO: 29.5 PG
MCH RBC QN AUTO: 30.3 PG
MCHC RBC AUTO-ENTMCNC: 34.2 G/DL
MCHC RBC AUTO-ENTMCNC: 35 G/DL
MCV RBC AUTO: 86 FL
MCV RBC AUTO: 87 FL
MICROSCOPIC COMMENT: NORMAL
MONOCYTES # BLD AUTO: 1.1 K/UL
MONOCYTES # BLD AUTO: 1.4 K/UL
MONOCYTES NFR BLD: 7.5 %
MONOCYTES NFR BLD: 8.6 %
NEUTROPHILS # BLD AUTO: 11.9 K/UL
NEUTROPHILS # BLD AUTO: 13.2 K/UL
NEUTROPHILS NFR BLD: 79.1 %
NEUTROPHILS NFR BLD: 79.2 %
NITRITE UR QL STRIP: NEGATIVE
PH UR STRIP: 7 [PH] (ref 5–8)
PHOSPHATE SERPL-MCNC: 1.2 MG/DL
PLATELET # BLD AUTO: 237 K/UL
PLATELET # BLD AUTO: 272 K/UL
PMV BLD AUTO: 9.7 FL
PMV BLD AUTO: 9.8 FL
POTASSIUM SERPL-SCNC: 3.1 MMOL/L
POTASSIUM SERPL-SCNC: 3.4 MMOL/L
PROCALCITONIN SERPL IA-MCNC: 0.12 NG/ML
PROT SERPL-MCNC: 5.7 G/DL
PROT UR QL STRIP: NEGATIVE
PROTHROMBIN TIME: 11.6 SEC
RBC # BLD AUTO: 3.86 M/UL
RBC # BLD AUTO: 4.16 M/UL
RBC #/AREA URNS AUTO: 4 /HPF (ref 0–4)
SODIUM SERPL-SCNC: 133 MMOL/L
SODIUM SERPL-SCNC: 136 MMOL/L
SP GR UR STRIP: 1 (ref 1–1.03)
SQUAMOUS #/AREA URNS AUTO: 28 /HPF
TROPONIN I SERPL DL<=0.01 NG/ML-MCNC: <0.006 NG/ML
URN SPEC COLLECT METH UR: ABNORMAL
UROBILINOGEN UR STRIP-ACNC: NEGATIVE EU/DL
WBC # BLD AUTO: 15.03 K/UL
WBC # BLD AUTO: 16.71 K/UL
WBC #/AREA URNS AUTO: 2 /HPF (ref 0–5)

## 2017-10-06 PROCEDURE — 97535 SELF CARE MNGMENT TRAINING: CPT

## 2017-10-06 PROCEDURE — 84484 ASSAY OF TROPONIN QUANT: CPT

## 2017-10-06 PROCEDURE — 97530 THERAPEUTIC ACTIVITIES: CPT

## 2017-10-06 PROCEDURE — 99232 SBSQ HOSP IP/OBS MODERATE 35: CPT | Mod: ,,, | Performed by: NURSE PRACTITIONER

## 2017-10-06 PROCEDURE — 80053 COMPREHEN METABOLIC PANEL: CPT

## 2017-10-06 PROCEDURE — 85730 THROMBOPLASTIN TIME PARTIAL: CPT

## 2017-10-06 PROCEDURE — 25000003 PHARM REV CODE 250: Performed by: NEUROLOGICAL SURGERY

## 2017-10-06 PROCEDURE — 93005 ELECTROCARDIOGRAM TRACING: CPT

## 2017-10-06 PROCEDURE — 85025 COMPLETE CBC W/AUTO DIFF WBC: CPT | Mod: 91

## 2017-10-06 PROCEDURE — 80048 BASIC METABOLIC PNL TOTAL CA: CPT

## 2017-10-06 PROCEDURE — 85610 PROTHROMBIN TIME: CPT

## 2017-10-06 PROCEDURE — 87086 URINE CULTURE/COLONY COUNT: CPT

## 2017-10-06 PROCEDURE — 84145 PROCALCITONIN (PCT): CPT

## 2017-10-06 PROCEDURE — 25000003 PHARM REV CODE 250: Performed by: STUDENT IN AN ORGANIZED HEALTH CARE EDUCATION/TRAINING PROGRAM

## 2017-10-06 PROCEDURE — 36415 COLL VENOUS BLD VENIPUNCTURE: CPT

## 2017-10-06 PROCEDURE — 87040 BLOOD CULTURE FOR BACTERIA: CPT | Mod: 59

## 2017-10-06 PROCEDURE — 84100 ASSAY OF PHOSPHORUS: CPT

## 2017-10-06 PROCEDURE — 83735 ASSAY OF MAGNESIUM: CPT

## 2017-10-06 PROCEDURE — 81001 URINALYSIS AUTO W/SCOPE: CPT

## 2017-10-06 PROCEDURE — 93010 ELECTROCARDIOGRAM REPORT: CPT | Mod: ,,, | Performed by: INTERNAL MEDICINE

## 2017-10-06 PROCEDURE — 83605 ASSAY OF LACTIC ACID: CPT

## 2017-10-06 PROCEDURE — 25000003 PHARM REV CODE 250: Performed by: PHYSICIAN ASSISTANT

## 2017-10-06 PROCEDURE — 97110 THERAPEUTIC EXERCISES: CPT

## 2017-10-06 PROCEDURE — 20600001 HC STEP DOWN PRIVATE ROOM

## 2017-10-06 PROCEDURE — 63600175 PHARM REV CODE 636 W HCPCS: Performed by: NEUROLOGICAL SURGERY

## 2017-10-06 RX ORDER — CEFEPIME HYDROCHLORIDE 2 G/50ML
2 INJECTION, SOLUTION INTRAVENOUS
Status: DISCONTINUED | OUTPATIENT
Start: 2017-10-06 | End: 2017-10-08

## 2017-10-06 RX ADMIN — SODIUM CHLORIDE 500 ML: 0.9 INJECTION, SOLUTION INTRAVENOUS at 03:10

## 2017-10-06 RX ADMIN — CALCIUM 500 MG: 500 TABLET ORAL at 10:10

## 2017-10-06 RX ADMIN — CEFAZOLIN SODIUM 1 G: 1 INJECTION, POWDER, FOR SOLUTION INTRAMUSCULAR; INTRAVENOUS at 06:10

## 2017-10-06 RX ADMIN — CEFAZOLIN SODIUM 1 G: 1 INJECTION, POWDER, FOR SOLUTION INTRAMUSCULAR; INTRAVENOUS at 02:10

## 2017-10-06 RX ADMIN — DIAZEPAM 5 MG: 5 TABLET ORAL at 01:10

## 2017-10-06 RX ADMIN — HYDROMORPHONE HYDROCHLORIDE 0.5 MG: 1 INJECTION, SOLUTION INTRAMUSCULAR; INTRAVENOUS; SUBCUTANEOUS at 04:10

## 2017-10-06 RX ADMIN — ACETAMINOPHEN 650 MG: 325 TABLET ORAL at 09:10

## 2017-10-06 RX ADMIN — CLONAZEPAM 2 MG: 1 TABLET ORAL at 09:10

## 2017-10-06 RX ADMIN — ACETAMINOPHEN 650 MG: 325 TABLET ORAL at 12:10

## 2017-10-06 RX ADMIN — OXYCODONE HYDROCHLORIDE AND ACETAMINOPHEN 1 TABLET: 5; 325 TABLET ORAL at 10:10

## 2017-10-06 NOTE — PLAN OF CARE
SW following for DC needs. SW in communication with CM.    SW discussed rehab with the pt's family at the bedside. SW provided list to family. Rehab preference for the pt is [facility].     CM placed consult to [facility] and sent referral in [facility].     Lorri Emmanuel, DIANA  [id number]

## 2017-10-06 NOTE — PT/OT/SLP PROGRESS
Occupational Therapy  Treatment    Candy Blakely   MRN: [id number]   Admitting Diagnosis: Burst fracture of lumbar vertebra    OT Date of Treatment: 10/06/17   OT Start Time: 1030  OT Stop Time: 1055  OT Total Time (min): 25 min    Billable Minutes:  Self Care/Home Management 08, Therapeutic Activity 07 and Therapeutic Exercise 10    General Precautions: Standard, fall  Orthopedic Precautions: RLE non weight bearing  Braces: LSO     Subjective:  Communicated with RN prior to session.    Pain/Comfort  Pain Rating 1:  (pt did not rate pain)    Objective:  Patient found with: peripheral IV, telemetry, JANNETTE drain     Functional Mobility:  Bed Mobility:  Scooting/Bridging: Stand by Assistance (to bridgle/scoot hips up towards HOB (bed in trendelenburg))  Sit to Supine: Minimum Assistance (for RLE lift)    Transfers:  Sit <> Stand Assistance: Minimum Assistance  Sit <> Stand Assistive Device: Standard Walker    Bed <> Chair Technique: Stand Pivot  Bed <> Chair Transfer Assistance: Minimum Assistance  Bed <> Chair Assistive Device: No Assistive Device    Activities of Daily Living:  OT initially planned to work on grooming task in standing with pt; pt stood with RW ~45sec before RN entered room to inform OT that pt's HR was elevated; therefore pt returned to seated position in bedside chair for completion of grooming tasks   Grooming Position: Seated  Grooming Level of Assistance: Independent    Therapeutic Activities and Exercises:  Pt completed UE therex as follows with HR monitored throughout ranging from 127-132 BPM:  Bicep curls x20 with 1# dowel  Chest press x15 with 1# dowel  Overhead press x15 with 1# dowel  Shoulder circles in flexion and abduction x20 each without added resistance     AM-PAC 6 CLICK ADL   How much help from another person does this patient currently need?   1 = Unable, Total/Dependent Assistance  2 = A lot, Maximum/Moderate Assistance  3 = A little, Minimum/Contact Guard/Supervision  4 = None,  "Modified [place]/Independent    Putting on and taking off regular lower body clothing? : 2  Bathing (including washing, rinsing, drying)?: 2  Toileting, which includes using toilet, bedpan, or urinal? : 3  Putting on and taking off regular upper body clothing?: 3  Taking care of personal grooming such as brushing teeth?: 4  Eating meals?: 4  Total Score: 18     AM-PAC Raw Score CMS "G-Code Modifier Level of Impairment Assistance   6 % Total / Unable   7 - 8 CM 80 - 100% Maximal Assist   9-13 CL 60 - 80% Moderate Assist   14 - 19 CK 40 - 60% Moderate Assist   20 - 22 CJ 20 - 40% Minimal Assist   23 CI 1-20% SBA / CGA   24 CH 0% Independent/ Mod I       Patient left supine with all lines intact, call button in reach and spouse present    ASSESSMENT:  Candy Blakely is a 47 y.o. female with a medical diagnosis of Burst fracture of lumbar vertebra and presents with good participation but limited ability to participate in standing tasks/ADLs due to elevated HR.  Pt stood with SW ~45sec in prep for ADL completion (Min A to stand and CGA to maintain balance in stance). Pt completed grooming tasks seated with independence and UE therex seated to improve strength/endurance for ADLs.  Functional chair->bed transfer completed with Min A and good adherence to NWB.    Pt is highly motivated and is a good candidate for IP rehab to maximize functional independence.       Rehab identified problem list/impairments: Rehab identified problem list/impairments: impaired endurance, impaired self care skills, impaired functional mobilty, gait instability, impaired balance, decreased lower extremity function, pain, orthopedic precautions    Rehab potential is good.    Activity tolerance: Good    Discharge recommendations: Discharge Facility/Level Of Care Needs: rehabilitation facility     Barriers to discharge: Barriers to Discharge: Decreased caregiver support, Inaccessible home environment    Equipment recommendations: " wheelchair, bedside commode, walker, rolling     GOALS:    Occupational Therapy Goals        Problem: Occupational Therapy Goal    Goal Priority Disciplines Outcome Interventions   Occupational Therapy Goal     OT, PT/OT Ongoing (interventions implemented as appropriate)    Description:  Goals to be met by: 10/12     Patient will increase functional independence with ADLs by performing:    Pt/CG demo understanding for spinal precautions with bed mobility.   Supine to sit with Stand-by Assistance.  Donning LSO while seated EOB with Minimal Assistance.  LE Dressing with Minimal Assistance and Assistive Devices as needed.  Stand pivot to chair with CGA while maintaining R LE NWB status.   Grooming while standing at sink with Minimal Assistance while maintaining R LE NWB status.                       Plan:  Patient to be seen 4 x/week to address the above listed problems via self-care/home management, therapeutic activities, therapeutic exercises, neuromuscular re-education  Plan of Care expires: 11/03/17  Plan of Care reviewed with: patient         JOÃO Alicea  10/06/2017

## 2017-10-06 NOTE — PT/OT/SLP PROGRESS
"Physical Therapy  Treatment    Candy Blakely   MRN: [id number]   Admitting Diagnosis: Burst fracture of lumbar vertebra    PT Received On: 10/06/17  PT Start Time: 1002     PT Stop Time: 1017    PT Total Time (min): 15 min       Billable Minutes:  Therapeutic Activity 15    Treatment Type: Treatment  PT/PTA: PT           General Precautions: Standard, fall  Orthopedic Precautions: RLE non weight bearing   Braces: LSO    Subjective:  Communicated with RN (Nam) prior to and after session.    "I am surprised by myself. I am glad I was able to do that so easily."    Pain/Comfort  Pain Rating 1: 5/10  Location - Orientation 1: generalized  Location 1: back  Pain Addressed 1: Reposition, Distraction, Cessation of Activity, Nurse notified  Pain Rating Post-Intervention 1: 5/10    Objective:   Patient found with: peripheral IV, telemetry, JANNETTE drain (hemovac; hard cast to R foot; LSO brace donned)    Functional Mobility:  Bed Mobility:   Rolling/Turning to Left: Minimum assistance (for mnmgnt of B LE and appropraite log roll technique)  Scooting/Bridging: Stand by Assistance (VCs for appropriate sequencing of task)  Supine to Sit: Minimum Assistance  Sit to Supine:  (pt remained sitting UIC)    Transfers:  Sit <> Stand Assistance: Contact Guard Assistance, Minimum Assistance (Min A to maintain R LE NWB)  Sit <> Stand Assistive Device: No Assistive Device  Bed <> Chair Technique: Squat Pivot  Bed <> Chair Assistance: Minimum Assistance  Bed <> Chair Assistive Device: No Assistive Device    Gait:   Gait Distance: Not appropriate given R LE NWB at this time    Balance:   Static Sit: GOOD-: Takes MODERATE challenges from all directions but inconsistently  Dynamic Sit: GOOD-: Maintains balance through MODERATE excursions of active trunk movement,     Static Stand: FAIR: Maintains without assist but unable to take challenges with B UE support  Dynamic stand: POOR: N/A     Therapeutic Activities and Exercises:  PT arrived to pt's " room to find pt resting quietly; agreeable to PT session. Pt performed transfer to and from chair x3 trials to assess both directions. Pt req VCs for safety with technique and sequencing of BUE to decrease work of task. PT reviewed mobility needs with pt and spouse. RN and PCTs entered; RN to administer meds. Pt agreeable to call for assist prior to return to bed level. Questions/concerns addressed within PT scope of practice; pt and RN with no further questions.    AM-PAC 6 CLICK MOBILITY  How much help from another person does this patient currently need?   1 = Unable, Total/Dependent Assistance  2 = A lot, Maximum/Moderate Assistance  3 = A little, Minimum/Contact Guard/Supervision  4 = None, Modified [place]/Independent    Turning over in bed (including adjusting bedclothes, sheets and blankets)?: 3  Sitting down on and standing up from a chair with arms (e.g., wheelchair, bedside commode, etc.): 2  Moving from lying on back to sitting on the side of the bed?: 3  Moving to and from a bed to a chair (including a wheelchair)?: 3  Need to walk in hospital room?: 2  Climbing 3-5 steps with a railing?: 1  Total Score: 14    AM-PAC Raw Score CMS G-Code Modifier Level of Impairment Assistance   6 % Total / Unable   7 - 9 CM 80 - 100% Maximal Assist   10 - 14 CL 60 - 80% Moderate Assist   15 - 19 CK 40 - 60% Moderate Assist   20 - 22 CJ 20 - 40% Minimal Assist   23 CI 1-20% SBA / CGA   24 CH 0% Independent/ Mod I     Patient left up in chair with all lines intact, call button in reach and RN notified. RN present.    Assessment:  Candy Blakely is a 47 y.o. female with a medical diagnosis of Burst fracture of lumbar vertebra and presents with good participation with PT this AM. Pt able to progress squat pivot transfers from MOD A to CGA with appropriate technique and good adherence to R LE NWB. Will progress as tolerated. Patient will benefit from continued skilled PT services to address the above and  below impairments:    Rehab identified problem list/impairments: Rehab identified problem list/impairments: impaired endurance, impaired self care skills, impaired functional mobilty, gait instability, impaired balance, decreased lower extremity function, pain, impaired skin, orthopedic precautions    Rehab potential is good.    Activity tolerance: Good    Discharge recommendations: Discharge Facility/Level Of Care Needs: rehabilitation facility     Barriers to discharge: Barriers to Discharge: Decreased caregiver support, Inaccessible home environment    Equipment recommendations: Equipment Needed After Discharge: wheelchair, bedside commode, walker, rolling     GOALS:    Physical Therapy Goals        Problem: Physical Therapy Goal    Goal Priority Disciplines Outcome Goal Variances Interventions   Physical Therapy Goal     PT/OT, PT Ongoing (interventions implemented as appropriate)     Description:  Goals to be met by: 10/15/2017     Patient will increase functional independence with mobility by performin. Supine to sit with Stand-by Assistance  2. Sit to supine with Stand-by Assistance  3. Sit to stand transfer with Contact Guard Assistance  4. Bed to chair transfer with SBA (CGA met 10/6)  5. Gait  x10 feet with Minimal Assistance using Rolling Walker.   6. Lower extremity exercise program x15 reps per handout, with independence  7. Propel w/c with B UE for 200ft Supervision                     PLAN:    Patient to be seen 5 x/week  to address the above listed problems via gait training, therapeutic activities, therapeutic exercises, neuromuscular re-education  Plan of Care expires: 17  Plan of Care reviewed with: patient, spouse     Natasha LINDA Arnold, PT, DPT  [phone]  10/6/2017

## 2017-10-06 NOTE — HOSPITAL COURSE
10/5/17:  Evaluated by therapy.  Bed mobility min-modA.  Transfers min-maxA.  No sit to stand or gait.  UBD Lilia and LBD maxA.  10/6/17:  Participated with therapy.  Bed mobility SBA-Lilia.  Sit to stand CGA-Lilia and transfers Lilia.  No gait.  10/8/17:  Participated with OT.  Bed mobility SBA.  Sit to stand SBA and transfers SBA.  Ambulated 5 ft SBA with RW.  Grooming SBA.

## 2017-10-06 NOTE — PLAN OF CARE
Problem: Patient Care Overview  Goal: Plan of Care Review  Outcome: Ongoing (interventions implemented as appropriate)  POC reviewed with the patient at 2100. Verbalized understanding. Fall precautions and safety maintained. No falls/injury this shift. No new skin impairments noted. Remains with incision to left back, dressing in place. AAOx4. Afebrile. VSS. Cardiac monitoring continued. Drains emptied Q 4 hours. Required assistance with pain management overnight, PRN medication given. Pt progressing toward goals. Will continue to monitor.

## 2017-10-06 NOTE — ASSESSMENT & PLAN NOTE
-  S/p fall  -  CT and X-ray of R ankle showed R navicular fracture and R calcaneus fracture.    -  CT pelvis without acute fracture.    -  Ortho consulted and no acute orthopedic surgical intervention necessary.  Recommended NWB of RLE and placed ankle in bulky calles splint.

## 2017-10-06 NOTE — PROGRESS NOTES
Paged neurosurgery in regards to pt with temp of 100.3 and HR sustaining in the 120s. Awaiting reply.    10/5/17 2035 - Spoke with neurosurgery in regards to above nsg note. No new orders at this time. Will continue to monitor.

## 2017-10-06 NOTE — PLAN OF CARE
Problem: Physical Therapy Goal  Goal: Physical Therapy Goal  Goals to be met by: 10/15/2017     Patient will increase functional independence with mobility by performin. Supine to sit with Stand-by Assistance  2. Sit to supine with Stand-by Assistance  3. Sit to stand transfer with Contact Guard Assistance  4. Bed to chair transfer with SBA (CGA met 10/6)  5. Gait  x10 feet with Minimal Assistance using Rolling Walker.   6. Lower extremity exercise program x15 reps per handout, with independence  7. Propel w/c with B UE for 200ft Supervision     Outcome: Ongoing (interventions implemented as appropriate)    Goals updated and appropriate to reflect pt's current mobility needs.    Natasha Arnold, PT, DPT  [phone]  10/6/2017

## 2017-10-06 NOTE — ASSESSMENT & PLAN NOTE
-  S/p fall  -  CT/MRI lumbar spine showed L2 burst fracture with central retropulsion resulting in severe spinal stenosis.    -  Neurosurgery consulted.  Now, s/p lateral L2 corpectomy with L1-L3 posterior fusion on 10/4/17.  -  Elevated WBC, tachycardic, hypotensive on 10/6/17--> Blood CX, UA, urine CX pending.     Recommendations  -  Encourage mobility, OOB in chair at least 3 hours per day, and early ambulation as appropriate  -  PT/OT evaluate and treat  -  Monitor for bowel and bladder dysfunction- marsh previously placed for urinary retention, now d/c  -  Monitor for spasticity  -  Monitor for and prevent skin breakdown and pressure ulcers  · Early mobility, repositioning/weight shifting every 20-30 minutes when sitting, turn patient every 2 hours, proper mattress/overlay and chair cushioning, pressure relief/heel protector boots  -  DVT prophylaxis:  AZEEM, SCD  -  Reviewed discharge options (IP rehab, SNF, HH therapy, and OP therapy)

## 2017-10-06 NOTE — PLAN OF CARE
Problem: Occupational Therapy Goal  Goal: Occupational Therapy Goal  Goals to be met by: 10/12     Patient will increase functional independence with ADLs by performing:    Pt/CG demo understanding for spinal precautions with bed mobility.   Supine to sit with Stand-by Assistance.  Donning LSO while seated EOB with Minimal Assistance.  LE Dressing with Minimal Assistance and Assistive Devices as needed.  Stand pivot to chair with CGA while maintaining R LE NWB status.   Grooming while standing at sink with Minimal Assistance while maintaining R LE NWB status.     Outcome: Ongoing (interventions implemented as appropriate)  OT goals remain appropriate.  JOÃO Alicea  10/6/2017

## 2017-10-06 NOTE — PROGRESS NOTES
Paged neurosurgery in regards to pt with HR sustaining in the 120s, hypotensive, low grade fever, and WBC of 16.71 this AM. Pt denies pain at this time. Awaiting reply.

## 2017-10-06 NOTE — CONSULTS
Inpatient consult to Physical Medicine Rehab  Consult performed by: EMMANUEL RICHMOND  Consult ordered by: RAFAEL MONGE  Reason for consult: rehab evaluation       Reviewed patient history and current admission.  Rehab team following.  Full consult to follow.    HIEU Herring, FNP-C  Physical Medicine & Rehabilitation   10/06/2017  Spectralink: [id number]

## 2017-10-06 NOTE — SUBJECTIVE & OBJECTIVE
Past Medical History:   Diagnosis Date    ADHD (attention deficit hyperactivity disorder)     Anxiety      Past Surgical History:   Procedure Laterality Date    TUBAL LIGATION  2003     Review of patient's allergies indicates:  No Known Allergies    Scheduled Medications:    bisacodyl  10 mg Rectal Daily    calcium carbonate  500 mg Oral Daily    ceFAZolin (ANCEF) IVPB  1 g Intravenous Q12H    clonazePAM  2 mg Oral QHS    sodium chloride 0.9%  500 mL Intravenous Once       PRN Medications: acetaminophen, aluminum-magnesium hydroxide-simethicone, dextrose 50%, dextrose 50%, diazePAM, glucagon (human recombinant), glucose, glucose, glucose, HYDROmorphone, labetalol, ondansetron, oxycodone-acetaminophen, promethazine (PHENERGAN) IVPB, senna-docusate 8.6-50 mg    Family History     None        Social History Main Topics    Smoking status: Current Every Day Smoker     Packs/day: 0.50     Types: Cigarettes    Smokeless tobacco: Never Used    Alcohol use No    Drug use:      Types: IV    Sexual activity: Not on file      Comment: Heroin occasionally     Review of Systems   Constitutional: Negative for chills, fatigue and fever.   HENT: Negative for drooling, hearing loss, trouble swallowing and voice change.    Eyes: Negative for pain and visual disturbance.   Respiratory: Negative for cough, shortness of breath and wheezing.    Cardiovascular: Negative for chest pain and palpitations.   Gastrointestinal: Negative for abdominal distention, nausea and vomiting.   Genitourinary: Negative for difficulty urinating and flank pain.   Musculoskeletal: Positive for back pain, gait problem and myalgias. Negative for arthralgias and neck pain.   Skin: Positive for wound. Negative for rash.   Neurological: Negative for dizziness, weakness, numbness and headaches.   Psychiatric/Behavioral: Negative for agitation and hallucinations. The patient is not nervous/anxious.      Objective:     Vital Signs (Most Recent):  Temp:  99.5 °F (37.5 °C) (10/06/17 032)  Pulse: (!) 117 (10/06/17 0700)  Resp: 16 (10/06/17 0320)  BP: (!) 107/55 (10/06/17 0320)  SpO2: 95 % (10/06/17 0320)    Vital Signs (24h Range):  Temp:  [98.7 °F (37.1 °C)-100.3 °F (37.9 °C)] 99.5 °F (37.5 °C)  Pulse:  [110-131] 117  Resp:  [16-18] 16  SpO2:  [95 %-98 %] 95 %  BP: (107-121)/(53-80) 107/55     Body mass index is 22.73 kg/m².    Physical Exam   Constitutional: She is oriented to person, place, and time. She appears well-developed and well-nourished. No distress.   Appears older than stated age   HENT:   Head: Normocephalic and atraumatic.   Right Ear: External ear normal.   Left Ear: External ear normal.   Nose: Nose normal.   Eyes: Right eye exhibits no discharge. Left eye exhibits no discharge. No scleral icterus.   Neck: Normal range of motion.   Cardiovascular: Normal rate, regular rhythm and intact distal pulses.    Pulmonary/Chest: Effort normal. No respiratory distress. She has no wheezes.   Abdominal: Soft. She exhibits no distension. There is no tenderness.   Musculoskeletal: She exhibits no edema.        Right ankle: She exhibits decreased range of motion. Tenderness.   RLE with calles splint intact.  Good color and sensation.    Neurological: She is alert and oriented to person, place, and time.   -  Mental Status:  AAOx3.  Follows commands.  Answers correct age and .  Recent and remote memory intact.   -  Speech and language:  no aphasia or dysarthria.    -  Motor:  RUE: 5/5.  LUE: 5/5.  RLE: limited by pain.  LLE: 5/5.  -  Tone:  Normal.  -  Sensory:  Intact to light touch and pin prick.   Skin: Skin is warm and dry. No rash noted.   Psychiatric: She has a normal mood and affect. Her behavior is normal. Thought content normal.   Vitals reviewed.    NEUROLOGICAL EXAMINATION:     MENTAL STATUS   Oriented to person, place, and time.       Diagnostic Results:   Labs: Reviewed  X-Ray: Reviewed  CT: Reviewed  MRI: Reviewed

## 2017-10-06 NOTE — ASSESSMENT & PLAN NOTE
-  Presented with low back and R foot pain after falling approximately 30 feet while climbing a tree in [place] anding directly on her back.    -  Denied head trauma or LOC.  CTH and cervical spine unremarkable.  -  Drug tox positive for benzodiazepines, opiates, and amphetamines.    -  Found to have R navicular fracture and R calcaneus fracture and L2 burst fracture with central retropulsion resulting in severe spinal stenosis s/p lateral L2 corpectomy with L1-L3 posterior fusion on 10/4/17.

## 2017-10-06 NOTE — CONSULTS
[facility]  Physical Medicine & Rehab  Consult Note    Patient Name: Candy Blakely  MRN: [id number]  Admission Date: 10/3/2017  Hospital Length of Stay: 3 days  Attending Physician: Toni Gasca MD   Primary Care Provider: Primary Doctor No     Inpatient consult to Physical Medicine & Rehabilitation  Consult performed by: Zahra Crandall NP  Consult requested by:  Toni Gasca MD    Collaborating Physician: Meena Alcazar MD  Reason for Consult:  Rehab evaluation    Consults  Subjective:     Principal Problem: Burst fracture of lumbar vertebra    HPI: Candy Blakely is a 47-year-old female with PMHx of ADHD, anxiety on chronic benzo with h/o of withdrawal seizures, h/o IVDU, and tobacco abuse.  Patient presented to [facility] on 10/3/17 with low back and R foot pain after falling approximately 30 feet while climbing a tree in [place].  Reported landing directly on her back.  Denied head trauma or LOC.  Drug tox positive for benzodiazepines, opiates, and amphetamines.  CT and X-ray of R ankle showed R navicular fracture and R calcaneus fracture.  CT pelvis without acute fracture.  Ortho consulted and no acute orthopedic surgical intervention necessary.  Recommended NWB of RLE and placed ankle in bulky calles splint.  CTH and cervical spine unremarkable.  CT/MRI lumbar spine showed L2 burst fracture with central retropulsion resulting in severe spinal stenosis.  Neurosurgery consulted.  Now, s/p lateral L2 corpectomy with L1-L3 posterior fusion on 10/4/17.      Functional History: Patient lives in [place] with her mother in a single story home with 3 steps to enter.  Prior to admission, she was independent with ADLs and mobility.  DME: none.    Hospital Course:  10/5/17:  Evaluated by therapy.  Bed mobility min-modA.  Transfers min-maxA.  No sit to stand or gait.  UBD Lilia and LBD maxA.  10/6/17:  Participated with therapy.  Bed mobility SBA-Lilia.  Sit to stand CGA-Lilia and transfers Lilia.  No  gait.     Past Medical History:   Diagnosis Date    ADHD (attention deficit hyperactivity disorder)     Anxiety      Past Surgical History:   Procedure Laterality Date    TUBAL LIGATION  2003     Review of patient's allergies indicates:  No Known Allergies    Scheduled Medications:    bisacodyl  10 mg Rectal Daily    calcium carbonate  500 mg Oral Daily    ceFAZolin (ANCEF) IVPB  1 g Intravenous Q12H    clonazePAM  2 mg Oral QHS    sodium chloride 0.9%  500 mL Intravenous Once       PRN Medications: acetaminophen, aluminum-magnesium hydroxide-simethicone, dextrose 50%, dextrose 50%, diazePAM, glucagon (human recombinant), glucose, glucose, glucose, HYDROmorphone, labetalol, ondansetron, oxycodone-acetaminophen, promethazine (PHENERGAN) IVPB, senna-docusate 8.6-50 mg    Family History     None        Social History Main Topics    Smoking status: Current Every Day Smoker     Packs/day: 0.50     Types: Cigarettes    Smokeless tobacco: Never Used    Alcohol use No    Drug use:      Types: IV    Sexual activity: Not on file      Comment: Heroin occasionally     Review of Systems   Constitutional: Negative for chills, fatigue and fever.   HENT: Negative for drooling, hearing loss, trouble swallowing and voice change.    Eyes: Negative for pain and visual disturbance.   Respiratory: Negative for cough, shortness of breath and wheezing.    Cardiovascular: Negative for chest pain and palpitations.   Gastrointestinal: Negative for abdominal distention, nausea and vomiting.   Genitourinary: Negative for difficulty urinating and flank pain.   Musculoskeletal: Positive for back pain, gait problem and myalgias. Negative for arthralgias and neck pain.   Skin: Positive for wound. Negative for rash.   Neurological: Negative for dizziness, weakness, numbness and headaches.   Psychiatric/Behavioral: Negative for agitation and hallucinations. The patient is not nervous/anxious.      Objective:     Vital Signs (Most  Recent):  Temp: 99.5 °F (37.5 °C) (10/06/17 0320)  Pulse: (!) 117 (10/06/17 0700)  Resp: 16 (10/06/17 0320)  BP: (!) 107/55 (10/06/17 032)  SpO2: 95 % (10/06/17 032)    Vital Signs (24h Range):  Temp:  [98.7 °F (37.1 °C)-100.3 °F (37.9 °C)] 99.5 °F (37.5 °C)  Pulse:  [110-131] 117  Resp:  [16-18] 16  SpO2:  [95 %-98 %] 95 %  BP: (107-121)/(53-80) 107/55     Body mass index is 22.73 kg/m².    Physical Exam   Constitutional: She is oriented to person, place, and time. She appears well-developed and well-nourished. No distress.   Appears older than stated age   HENT:   Head: Normocephalic and atraumatic.   Right Ear: External ear normal.   Left Ear: External ear normal.   Nose: Nose normal.   Eyes: Right eye exhibits no discharge. Left eye exhibits no discharge. No scleral icterus.   Neck: Normal range of motion.   Cardiovascular: Normal rate, regular rhythm and intact distal pulses.    Pulmonary/Chest: Effort normal. No respiratory distress. She has no wheezes.   Abdominal: Soft. She exhibits no distension. There is no tenderness.   Musculoskeletal: She exhibits no edema.        Right ankle: She exhibits decreased range of motion. Tenderness.   RLE with calles splint intact.  Good color and sensation.    Neurological: She is alert and oriented to person, place, and time.   -  Mental Status:  AAOx3.  Follows commands.  Answers correct age and .  Recent and remote memory intact.   -  Speech and language:  no aphasia or dysarthria.    -  Motor:  RUE: 5/5.  LUE: 5/5.  RLE: limited by pain.  LLE: 5/5.  -  Tone:  Normal.  -  Sensory:  Intact to light touch and pin prick.   Skin: Skin is warm and dry. No rash noted.   Psychiatric: She has a normal mood and affect. Her behavior is normal. Thought content normal.   Vitals reviewed.    Diagnostic Results:   Labs: Reviewed  X-Ray: Reviewed  CT: Reviewed  MRI: Reviewed    Assessment/Plan:     * Burst fracture of lumbar vertebra    -  S/p fall  -  CT/MRI lumbar spine showed L2  burst fracture with central retropulsion resulting in severe spinal stenosis.    -  Neurosurgery consulted.  Now, s/p lateral L2 corpectomy with L1-L3 posterior fusion on 10/4/17.  -  Elevated WBC, tachycardic, hypotensive on 10/6/17--> Blood CX, UA, urine CX pending.     Recommendations  -  Encourage mobility, OOB in chair at least 3 hours per day, and early ambulation as appropriate  -  PT/OT evaluate and treat  -  Monitor for bowel and bladder dysfunction- marsh previously placed for urinary retention, now d/c  -  Monitor for spasticity  -  Monitor for and prevent skin breakdown and pressure ulcers  · Early mobility, repositioning/weight shifting every 20-30 minutes when sitting, turn patient every 2 hours, proper mattress/overlay and chair cushioning, pressure relief/heel protector boots  -  DVT prophylaxis:  AZEEM, SCD  -  Reviewed discharge options (IP rehab, SNF, HH therapy, and OP therapy)        Trauma    -  Presented with low back and R foot pain after falling approximately 30 feet while climbing a tree in [place] anding directly on her back.    -  Denied head trauma or LOC.  CTH and cervical spine unremarkable.  -  Drug tox positive for benzodiazepines, opiates, and amphetamines.    -  Found to have R navicular fracture and R calcaneus fracture and L2 burst fracture with central retropulsion resulting in severe spinal stenosis s/p lateral L2 corpectomy with L1-L3 posterior fusion on 10/4/17.         Closed navicular fracture of right foot    -  S/p fall  -  CT and X-ray of R ankle showed R navicular fracture and R calcaneus fracture.    -  CT pelvis without acute fracture.    -  Ortho consulted and no acute orthopedic surgical intervention necessary.  Recommended NWB of RLE and placed ankle in bulky calles splint.          Patient with rehab goals.  Not ready for discharge (working up leukocytosis).  Will follow and discuss with rehab team for final rehab recommendation.    Thank you for your  consult.    JORGE Herring  Department of Physical Medicine & Rehab  [facility]

## 2017-10-06 NOTE — PLAN OF CARE
CM met with patient and boyfriend this am. Patient lying in bed. Planned discharge is Rehab - Plan (A) or home with family - Plan (B).     10/06/17 1213   Discharge Reassessment   Assessment Type Discharge Planning Reassessment   Provided patient/caregiver education on the expected discharge date and the discharge plan No   Do you have any problems affording any of your prescribed medications? No   Discharge Plan A Rehab   Discharge Plan B Home with family   Patient choice form signed by patient/caregiver N/A   Can the patient answer the patient profile reliably? Yes, cognitively intact   How does the patient rate their overall health at the present time? Good   Describe the patient's ability to walk at the present time. Major restrictions/daily assistance from another person   How often would a person be available to care for the patient? Often   Number of comorbid conditions (as recorded on the chart) Two   During the past month, has the patient often been bothered by feeling down, depressed or hopeless? No   During the past month, has the patient often been bothered by little interest or pleasure in doing things? No

## 2017-10-06 NOTE — ASSESSMENT & PLAN NOTE
47 y.o. female s/p 30-ft fall from tree with L2 burst fracture with central retropulsion, now s/p lateral L2 corpectomy and L1-3 posterior fusion.    - Bcx, UA, Ucx, CXR ordered for hypotension, tachycardia. O2 sats wnl. Afebrile, though reported 100.1F overnight. CTM.  - Will remove drains today.  - LSO brace when standing or OOB.  - Urinary retention: Resolved.  - Ortho: WBAT. Navicular fracture splinted, no surgical intervention warranted.  - General surgery following, no evidence for abd injury on CT & exam without concern for abd injury  - Pending rehab.

## 2017-10-06 NOTE — SUBJECTIVE & OBJECTIVE
"Interval History: Voiding s/p marsh removal. Hypotensive, tachycardic overnight. Afebrile. 1/2L bolus given. Bcx, UA, Ucx, CXR ordered. Will remove drains today.    Medications:  Continuous Infusions:     Scheduled Meds:   bisacodyl  10 mg Rectal Daily    calcium carbonate  500 mg Oral Daily    ceFAZolin (ANCEF) IVPB  1 g Intravenous Q12H    clonazePAM  2 mg Oral QHS    sodium chloride 0.9%  500 mL Intravenous Once     PRN Meds:acetaminophen, aluminum-magnesium hydroxide-simethicone, dextrose 50%, dextrose 50%, diazePAM, glucagon (human recombinant), glucose, glucose, glucose, HYDROmorphone, labetalol, ondansetron, oxycodone-acetaminophen, promethazine (PHENERGAN) IVPB, senna-docusate 8.6-50 mg     Review of Systems    Objective:     Weight: 54.6 kg (120 lb 4.8 oz)  Body mass index is 22.73 kg/m².  Vital Signs (Most Recent):  Temp: 99.1 °F (37.3 °C) (10/06/17 1113)  Pulse: (!) 116 (10/06/17 1113)  Resp: 17 (10/06/17 1113)  BP: (!) 114/58 (10/06/17 1113)  SpO2: (!) 94 % (10/06/17 1113) Vital Signs (24h Range):  Temp:  [98.7 °F (37.1 °C)-100.3 °F (37.9 °C)] 99.1 °F (37.3 °C)  Pulse:  [110-131] 116  Resp:  [16-18] 17  SpO2:  [94 %-98 %] 94 %  BP: (107-121)/(53-65) 114/58                           Urethral Catheter 10/04/17 0910 Non-latex 16 Fr. (Active)   Site Assessment Clean;Intact 10/4/2017 11:57 AM   Collection Container Leg bag 10/4/2017 11:57 AM   Securement Method secured to top of thigh w/ adhesive device 10/4/2017 11:57 AM   Catheter Care Performed yes 10/4/2017  9:10 AM   Reason for Continuing Urinary Catheterization Urinary retention 10/4/2017  9:10 AM   CAUTI Prevention Bundle StatLock in place w 1" slack;Intact seal between catheter & drainage tubing;Drainage bag off the floor;Green sheeting clip in use;No dependent loops or kinks;Drainage bag not overfilled (<2/3 full);Drainage bag below bladder 10/4/2017  9:10 AM   Output (mL) 575 mL 10/4/2017  9:10 AM       Neurosurgery Physical Exam     General: " well developed, well nourished, no distress  Head: normocephalic, atraumatic  Neurologic: Alert and oriented. Thought content appropriate  GCS: Motor: 6/Verbal: 5/Eyes: 4 GCS Total: 15  Mental Status: Awake, Alert, Oriented x 4  Language: No aphasia  Speech: No dysarthria  Cranial nerves: face symmetric, tongue midline, CN II-XII grossly intact.   Eyes: pupils equal, round, reactive to light with accommodation, EOMI  Pulmonary: normal respirations, not labored, no accessory muscles used  Abdomen: soft, non-distended, not tender to palpation  Sensory: intact to light touch throughout  Motor Strength: Moves all extremities spontaneously with good tone.   No abnormal movements seen.     Strength  Deltoids Triceps Biceps Wrist Extension Wrist Flexion Hand    Upper: R 5/5 5/5 5/5 5/5 5/5 5/5    L 5/5 5/5 5/5 5/5 5/5 5/5     Iliopsoas Quadriceps Knee  Flexion Tibialis  anterior Gastro- cnemius EHL   Lower: R 3/5 3/5 5/5 N/A N/A N/A    L 5/5 5/5 5/5 5/5 5/5 5/5     Unable to fully examine RLE due to large splint to knee, can wiggle toes & at least 3/5 HF  Pronator Drift: no drift noted  Finger-to-nose: Intact bilaterally  Lord: absent  Clonus: absent on left, unable to assess on right due to R splint  Babinski: absent on left, unable to assess on right due to R splint  Pulses: 2+ and symmetric radial and dorsalis pedis  Skin: warm, dry and intact, no rashes    Significant Labs:    Recent Labs  Lab 10/05/17  0358 10/06/17  0217   GLU 73 122*    136   K 3.5 3.1*    101   CO2 25 27   BUN 9 6   CREATININE 0.7 0.7   CALCIUM 7.9* 8.5*       Recent Labs  Lab 10/04/17  2052 10/05/17  0358 10/06/17  0217   WBC 12.38 9.77 16.71*   HGB 12.5 12.7 12.6   HCT 37.1 37.7 36.0*    266 272     No results for input(s): LABPT, INR, APTT in the last 48 hours.  Microbiology Results (last 7 days)     Procedure Component Value Units Date/Time    Blood culture [[id number]]     Order Status:  No result Specimen:  Blood      Blood culture [[id number]]     Order Status:  No result Specimen:  Blood     Urine culture [[id number]]     Order Status:  No result Specimen:  Urine           Significant Diagnostics:  I personally reviewed MRI Lumbar spine & agree with findings: Acute compression/burst fracture of the L2 vertebral body with extension into the left pedicle as detailed above. Retropulsion of fracture fragments with mass effect upon the thecal sac resulting in severe spinal canal stenosis at this level. Small amount of anterior epidural hemorrhage.    Nondisplaced fracture involving the L1 vertebral body, without significant height loss or retropulsion of fracture fragments.

## 2017-10-06 NOTE — PROGRESS NOTES
[facility]  Neurosurgery  Progress Note    Subjective:     History of Present Illness: Candy Blakely is a 47 y.o. female PMH substance abuse who presents s/p approximate 30-foot fall from a tree while intoxicated today. Pt presented with complaints of low back and pelvic pain. Plain imaging revealed fractures of the right inferior pubic ramus, right navicula, and severe burst fracture of L2. CT revealed aforementioned burst fracture with centrally displaced bone fragments and severe cord compression. Neurosurgery was consulted for evaluation.    Post-Op Info:  Procedure(s) (LRB):  DIRECT LATERAL INTERBODY FUSION/Lateral L2 corpectomy with L1-3 fusion (N/A)   2 Days Post-Op     Interval History: Voiding s/p marsh removal. Hypotensive, tachycardic overnight. Afebrile. 1/2L bolus given. Bcx, UA, Ucx, CXR ordered. Will remove drains today.    Medications:  Continuous Infusions:     Scheduled Meds:   bisacodyl  10 mg Rectal Daily    calcium carbonate  500 mg Oral Daily    ceFAZolin (ANCEF) IVPB  1 g Intravenous Q12H    clonazePAM  2 mg Oral QHS    sodium chloride 0.9%  500 mL Intravenous Once     PRN Meds:acetaminophen, aluminum-magnesium hydroxide-simethicone, dextrose 50%, dextrose 50%, diazePAM, glucagon (human recombinant), glucose, glucose, glucose, HYDROmorphone, labetalol, ondansetron, oxycodone-acetaminophen, promethazine (PHENERGAN) IVPB, senna-docusate 8.6-50 mg     Review of Systems    Objective:     Weight: 54.6 kg (120 lb 4.8 oz)  Body mass index is 22.73 kg/m².  Vital Signs (Most Recent):  Temp: 99.1 °F (37.3 °C) (10/06/17 1113)  Pulse: (!) 116 (10/06/17 1113)  Resp: 17 (10/06/17 1113)  BP: (!) 114/58 (10/06/17 1113)  SpO2: (!) 94 % (10/06/17 1113) Vital Signs (24h Range):  Temp:  [98.7 °F (37.1 °C)-100.3 °F (37.9 °C)] 99.1 °F (37.3 °C)  Pulse:  [110-131] 116  Resp:  [16-18] 17  SpO2:  [94 %-98 %] 94 %  BP: (107-121)/(53-65) 114/58                           Urethral Catheter  "10/04/17 0910 Non-latex 16 Fr. (Active)   Site Assessment Clean;Intact 10/4/2017 11:57 AM   Collection Container Leg bag 10/4/2017 11:57 AM   Securement Method secured to top of thigh w/ adhesive device 10/4/2017 11:57 AM   Catheter Care Performed yes 10/4/2017  9:10 AM   Reason for Continuing Urinary Catheterization Urinary retention 10/4/2017  9:10 AM   CAUTI Prevention Bundle StatLock in place w 1" slack;Intact seal between catheter & drainage tubing;Drainage bag off the floor;Green sheeting clip in use;No dependent loops or kinks;Drainage bag not overfilled (<2/3 full);Drainage bag below bladder 10/4/2017  9:10 AM   Output (mL) 575 mL 10/4/2017  9:10 AM       Neurosurgery Physical Exam     General: well developed, well nourished, no distress  Head: normocephalic, atraumatic  Neurologic: Alert and oriented. Thought content appropriate  GCS: Motor: 6/Verbal: 5/Eyes: 4 GCS Total: 15  Mental Status: Awake, Alert, Oriented x 4  Language: No aphasia  Speech: No dysarthria  Cranial nerves: face symmetric, tongue midline, CN II-XII grossly intact.   Eyes: pupils equal, round, reactive to light with accommodation, EOMI  Pulmonary: normal respirations, not labored, no accessory muscles used  Abdomen: soft, non-distended, not tender to palpation  Sensory: intact to light touch throughout  Motor Strength: Moves all extremities spontaneously with good tone.   No abnormal movements seen.     Strength  Deltoids Triceps Biceps Wrist Extension Wrist Flexion Hand    Upper: R 5/5 5/5 5/5 5/5 5/5 5/5    L 5/5 5/5 5/5 5/5 5/5 5/5     Iliopsoas Quadriceps Knee  Flexion Tibialis  anterior Gastro- cnemius EHL   Lower: R 3/5 3/5 5/5 N/A N/A N/A    L 5/5 5/5 5/5 5/5 5/5 5/5     Unable to fully examine RLE due to large splint to knee, can wiggle toes & at least 3/5 HF  Pronator Drift: no drift noted  Finger-to-nose: Intact bilaterally  Lord: absent  Clonus: absent on left, unable to assess on right due to R splint  Babinski: absent " on left, unable to assess on right due to R splint  Pulses: 2+ and symmetric radial and dorsalis pedis  Skin: warm, dry and intact, no rashes    Significant Labs:    Recent Labs  Lab 10/05/17  0358 10/06/17  0217   GLU 73 122*    136   K 3.5 3.1*    101   CO2 25 27   BUN 9 6   CREATININE 0.7 0.7   CALCIUM 7.9* 8.5*       Recent Labs  Lab 10/04/17  2052 10/05/17  0358 10/06/17  0217   WBC 12.38 9.77 16.71*   HGB 12.5 12.7 12.6   HCT 37.1 37.7 36.0*    266 272     No results for input(s): LABPT, INR, APTT in the last 48 hours.  Microbiology Results (last 7 days)     Procedure Component Value Units Date/Time    Blood culture [[id number]]     Order Status:  No result Specimen:  Blood     Blood culture [[id number]]     Order Status:  No result Specimen:  Blood     Urine culture [[id number]]     Order Status:  No result Specimen:  Urine           Significant Diagnostics:  I personally reviewed MRI Lumbar spine & agree with findings: Acute compression/burst fracture of the L2 vertebral body with extension into the left pedicle as detailed above. Retropulsion of fracture fragments with mass effect upon the thecal sac resulting in severe spinal canal stenosis at this level. Small amount of anterior epidural hemorrhage.    Nondisplaced fracture involving the L1 vertebral body, without significant height loss or retropulsion of fracture fragments.     Assessment/Plan:     * Burst fracture of lumbar vertebra    47 y.o. female s/p 30-ft fall from tree with L2 burst fracture with central retropulsion, now s/p lateral L2 corpectomy and L1-3 posterior fusion.    - Bcx, UA, Ucx, CXR ordered for hypotension, tachycardia. O2 sats wnl. Afebrile, though reported 100.1F overnight. CTM.  - Will remove drains today.  - LSO brace when standing or OOB.  - Urinary retention: Resolved.  - Ortho: WBAT. Navicular fracture splinted, no surgical intervention warranted.  - General surgery following, no evidence for abd injury on CT  & exam without concern for abd injury  - Pending rehab.              Nancy Manning PA-C  Neurosurgery  [facility]

## 2017-10-06 NOTE — HPI
Candy Blakely is a 47-year-old female with PMHx of ADHD, anxiety on chronic benzo with h/o of withdrawal seizures, h/o IVDU, and tobacco abuse.  Patient presented to [facility] on 10/3/17 with low back and R foot pain after falling approximately 30 feet while [place]  Reported landing directly on her back.  Denied head trauma or LOC.  Drug tox positive for benzodiazepines, opiates, and amphetamines.  CT and X-ray of R ankle showed R navicular fracture and R calcaneus fracture.  CT pelvis without acute fracture.  Ortho consulted and no acute orthopedic surgical intervention necessary.  Recommended NWB of RLE and placed ankle in bulky calles splint.  CTH and cervical spine unremarkable.  CT/MRI lumbar spine showed L2 burst fracture with central retropulsion resulting in severe spinal stenosis.  Neurosurgery consulted.  Now, s/p lateral L2 corpectomy with L1-L3 posterior fusion on 10/4/17.      Functional History: Patient lives in [place] with her mother in a single story home with 3 steps to enter.  Prior to admission, she was independent with ADLs and mobility.  DME: none.

## 2017-10-07 LAB
ANION GAP SERPL CALC-SCNC: 8 MMOL/L
BASOPHILS # BLD AUTO: 0.02 K/UL
BASOPHILS NFR BLD: 0.1 %
BLD PROD TYP BPU: NORMAL
BLOOD UNIT EXPIRATION DATE: NORMAL
BLOOD UNIT TYPE CODE: 5100
BLOOD UNIT TYPE: NORMAL
BUN SERPL-MCNC: 6 MG/DL
CALCIUM SERPL-MCNC: 8.2 MG/DL
CHLORIDE SERPL-SCNC: 100 MMOL/L
CO2 SERPL-SCNC: 25 MMOL/L
CODING SYSTEM: NORMAL
CREAT SERPL-MCNC: 0.6 MG/DL
DIFFERENTIAL METHOD: ABNORMAL
DISPENSE STATUS: NORMAL
EOSINOPHIL # BLD AUTO: 0 K/UL
EOSINOPHIL NFR BLD: 0.3 %
ERYTHROCYTE [DISTWIDTH] IN BLOOD BY AUTOMATED COUNT: 14.1 %
EST. GFR  (AFRICAN AMERICAN): >60 ML/MIN/1.73 M^2
EST. GFR  (NON AFRICAN AMERICAN): >60 ML/MIN/1.73 M^2
GLUCOSE SERPL-MCNC: 191 MG/DL
HCT VFR BLD AUTO: 34.7 %
HGB BLD-MCNC: 11.8 G/DL
LACTATE SERPL-SCNC: 0.9 MMOL/L
LACTATE SERPL-SCNC: 2.1 MMOL/L
LYMPHOCYTES # BLD AUTO: 1.7 K/UL
LYMPHOCYTES NFR BLD: 11.8 %
MCH RBC QN AUTO: 30.3 PG
MCHC RBC AUTO-ENTMCNC: 34 G/DL
MCV RBC AUTO: 89 FL
MONOCYTES # BLD AUTO: 1.4 K/UL
MONOCYTES NFR BLD: 9.7 %
NEUTROPHILS # BLD AUTO: 10.9 K/UL
NEUTROPHILS NFR BLD: 77.6 %
PLATELET # BLD AUTO: 268 K/UL
PMV BLD AUTO: 9.8 FL
POTASSIUM SERPL-SCNC: 3.3 MMOL/L
RBC # BLD AUTO: 3.89 M/UL
SODIUM SERPL-SCNC: 133 MMOL/L
TRANS ERYTHROCYTES VOL PATIENT: NORMAL ML
WBC # BLD AUTO: 14.09 K/UL

## 2017-10-07 PROCEDURE — 83605 ASSAY OF LACTIC ACID: CPT | Mod: 91

## 2017-10-07 PROCEDURE — 25000003 PHARM REV CODE 250: Performed by: STUDENT IN AN ORGANIZED HEALTH CARE EDUCATION/TRAINING PROGRAM

## 2017-10-07 PROCEDURE — 20600001 HC STEP DOWN PRIVATE ROOM

## 2017-10-07 PROCEDURE — 93005 ELECTROCARDIOGRAM TRACING: CPT

## 2017-10-07 PROCEDURE — 36415 COLL VENOUS BLD VENIPUNCTURE: CPT

## 2017-10-07 PROCEDURE — 63600175 PHARM REV CODE 636 W HCPCS: Performed by: STUDENT IN AN ORGANIZED HEALTH CARE EDUCATION/TRAINING PROGRAM

## 2017-10-07 PROCEDURE — 85025 COMPLETE CBC W/AUTO DIFF WBC: CPT

## 2017-10-07 PROCEDURE — 63600175 PHARM REV CODE 636 W HCPCS: Performed by: NEUROLOGICAL SURGERY

## 2017-10-07 PROCEDURE — 87040 BLOOD CULTURE FOR BACTERIA: CPT

## 2017-10-07 PROCEDURE — 25000003 PHARM REV CODE 250: Performed by: NEUROLOGICAL SURGERY

## 2017-10-07 PROCEDURE — 80048 BASIC METABOLIC PNL TOTAL CA: CPT

## 2017-10-07 PROCEDURE — 25000003 PHARM REV CODE 250: Performed by: PHYSICIAN ASSISTANT

## 2017-10-07 PROCEDURE — 93010 ELECTROCARDIOGRAM REPORT: CPT | Mod: ,,, | Performed by: INTERNAL MEDICINE

## 2017-10-07 RX ORDER — POTASSIUM CHLORIDE 20 MEQ/15ML
20 SOLUTION ORAL ONCE
Status: COMPLETED | OUTPATIENT
Start: 2017-10-07 | End: 2017-10-07

## 2017-10-07 RX ADMIN — CEFEPIME HYDROCHLORIDE 2 G: 2 INJECTION, SOLUTION INTRAVENOUS at 12:10

## 2017-10-07 RX ADMIN — OXYCODONE HYDROCHLORIDE AND ACETAMINOPHEN 1 TABLET: 5; 325 TABLET ORAL at 04:10

## 2017-10-07 RX ADMIN — VANCOMYCIN HYDROCHLORIDE 750 MG: 750 INJECTION, POWDER, LYOPHILIZED, FOR SOLUTION INTRAVENOUS at 11:10

## 2017-10-07 RX ADMIN — CEFEPIME HYDROCHLORIDE 2 G: 2 INJECTION, SOLUTION INTRAVENOUS at 09:10

## 2017-10-07 RX ADMIN — CEFEPIME HYDROCHLORIDE 2 G: 2 INJECTION, SOLUTION INTRAVENOUS at 04:10

## 2017-10-07 RX ADMIN — OXYCODONE HYDROCHLORIDE AND ACETAMINOPHEN 1 TABLET: 5; 325 TABLET ORAL at 11:10

## 2017-10-07 RX ADMIN — CALCIUM 500 MG: 500 TABLET ORAL at 09:10

## 2017-10-07 RX ADMIN — VANCOMYCIN HYDROCHLORIDE 750 MG: 750 INJECTION, POWDER, LYOPHILIZED, FOR SOLUTION INTRAVENOUS at 12:10

## 2017-10-07 RX ADMIN — CEFEPIME HYDROCHLORIDE 2 G: 2 INJECTION, SOLUTION INTRAVENOUS at 11:10

## 2017-10-07 RX ADMIN — HYDROMORPHONE HYDROCHLORIDE 0.5 MG: 1 INJECTION, SOLUTION INTRAMUSCULAR; INTRAVENOUS; SUBCUTANEOUS at 05:10

## 2017-10-07 RX ADMIN — POTASSIUM CHLORIDE 20 MEQ: 20 SOLUTION ORAL at 09:10

## 2017-10-07 RX ADMIN — BISACODYL 10 MG: 10 SUPPOSITORY RECTAL at 09:10

## 2017-10-07 RX ADMIN — CLONAZEPAM 2 MG: 1 TABLET ORAL at 09:10

## 2017-10-07 RX ADMIN — SODIUM CHLORIDE 1000 ML: 0.9 INJECTION, SOLUTION INTRAVENOUS at 12:10

## 2017-10-07 RX ADMIN — ACETAMINOPHEN 650 MG: 325 TABLET ORAL at 11:10

## 2017-10-07 NOTE — PROGRESS NOTES
[facility]  Neurosurgery  Progress Note    Subjective:     History of Present Illness: Candy Blakely is a 47 y.o. female PMH substance abuse who presents s/p approximate 30-foot fall from a tree while intoxicated today. Pt presented with complaints of low back and pelvic pain. Plain imaging revealed fractures of the right inferior pubic ramus, right navicula, and severe burst fracture of L2. CT revealed aforementioned burst fracture with centrally displaced bone fragments and severe cord compression. Neurosurgery was consulted for evaluation.    Post-Op Info:  Procedure(s) (LRB):  DIRECT LATERAL INTERBODY FUSION/Lateral L2 corpectomy with L1-3 fusion (N/A)   3 Days Post-Op     Febrile overnight, started on broad spectrum antibiotics, pt remains neurologically intact on exam with no subjective complaints.    Assessment/Plan:     * Burst fracture of lumbar vertebra    47 y.o. female s/p 30-ft fall from tree with L2 burst fracture with central retropulsion, now s/p lateral L2 corpectomy and L1-3 posterior fusion.    - Bcx, UA, Ucx, CXR ordered for hypotension, tachycardia. O2 sats wnl. Afebrile, though reported 100.1F overnight. CTM.  - Will remove drains today.  - LSO brace when standing or OOB.  - Urinary retention: Resolved.  - Ortho: WBAT. Navicular fracture splinted, no surgical intervention warranted.  - General surgery following, no evidence for abd injury on CT & exam without concern for abd injury  - Pending rehab.          --Will fu infectious workup    Horace Islas MD  Neurosurgery  [facility]

## 2017-10-07 NOTE — PLAN OF CARE
Problem: Patient Care Overview  Goal: Plan of Care Review  Poc reviewed with pt. Including safety precautions to reduce risk for falls, measures to reduce risk for pressure ulcers such as repositioning every 2 hours, and pain management.  Pt. Verbalized understanding. Pt.'s pain controlled well with pain medications ordered. Will cont. To monitor.

## 2017-10-08 LAB
ANION GAP SERPL CALC-SCNC: 8 MMOL/L
BACTERIA UR CULT: NO GROWTH
BASOPHILS # BLD AUTO: 0.02 K/UL
BASOPHILS NFR BLD: 0.3 %
BUN SERPL-MCNC: 9 MG/DL
CALCIUM SERPL-MCNC: 8.9 MG/DL
CHLORIDE SERPL-SCNC: 101 MMOL/L
CO2 SERPL-SCNC: 29 MMOL/L
CREAT SERPL-MCNC: 0.7 MG/DL
DIFFERENTIAL METHOD: ABNORMAL
EOSINOPHIL # BLD AUTO: 0.1 K/UL
EOSINOPHIL NFR BLD: 1.1 %
ERYTHROCYTE [DISTWIDTH] IN BLOOD BY AUTOMATED COUNT: 14.1 %
EST. GFR  (AFRICAN AMERICAN): >60 ML/MIN/1.73 M^2
EST. GFR  (NON AFRICAN AMERICAN): >60 ML/MIN/1.73 M^2
GLUCOSE SERPL-MCNC: 97 MG/DL
HCT VFR BLD AUTO: 35.1 %
HGB BLD-MCNC: 11.7 G/DL
LYMPHOCYTES # BLD AUTO: 1.2 K/UL
LYMPHOCYTES NFR BLD: 18 %
MCH RBC QN AUTO: 29.5 PG
MCHC RBC AUTO-ENTMCNC: 33.3 G/DL
MCV RBC AUTO: 88 FL
MONOCYTES # BLD AUTO: 0.7 K/UL
MONOCYTES NFR BLD: 11.1 %
NEUTROPHILS # BLD AUTO: 4.4 K/UL
NEUTROPHILS NFR BLD: 69.2 %
PLATELET # BLD AUTO: 266 K/UL
PMV BLD AUTO: 9.5 FL
POTASSIUM SERPL-SCNC: 4.2 MMOL/L
RBC # BLD AUTO: 3.97 M/UL
SODIUM SERPL-SCNC: 138 MMOL/L
VANCOMYCIN TROUGH SERPL-MCNC: 5.6 UG/ML
WBC # BLD AUTO: 6.4 K/UL

## 2017-10-08 PROCEDURE — 80202 ASSAY OF VANCOMYCIN: CPT

## 2017-10-08 PROCEDURE — 63600175 PHARM REV CODE 636 W HCPCS: Performed by: STUDENT IN AN ORGANIZED HEALTH CARE EDUCATION/TRAINING PROGRAM

## 2017-10-08 PROCEDURE — 36415 COLL VENOUS BLD VENIPUNCTURE: CPT

## 2017-10-08 PROCEDURE — 25000003 PHARM REV CODE 250: Performed by: NEUROLOGICAL SURGERY

## 2017-10-08 PROCEDURE — 20600001 HC STEP DOWN PRIVATE ROOM

## 2017-10-08 PROCEDURE — 85025 COMPLETE CBC W/AUTO DIFF WBC: CPT

## 2017-10-08 PROCEDURE — 80048 BASIC METABOLIC PNL TOTAL CA: CPT

## 2017-10-08 PROCEDURE — 25000003 PHARM REV CODE 250: Performed by: STUDENT IN AN ORGANIZED HEALTH CARE EDUCATION/TRAINING PROGRAM

## 2017-10-08 PROCEDURE — 97535 SELF CARE MNGMENT TRAINING: CPT

## 2017-10-08 PROCEDURE — 63600175 PHARM REV CODE 636 W HCPCS: Performed by: NEUROLOGICAL SURGERY

## 2017-10-08 PROCEDURE — 25000003 PHARM REV CODE 250: Performed by: PHYSICIAN ASSISTANT

## 2017-10-08 PROCEDURE — 97530 THERAPEUTIC ACTIVITIES: CPT

## 2017-10-08 RX ORDER — PANTOPRAZOLE SODIUM 40 MG/1
40 TABLET, DELAYED RELEASE ORAL DAILY
Status: DISCONTINUED | OUTPATIENT
Start: 2017-10-09 | End: 2017-10-10 | Stop reason: HOSPADM

## 2017-10-08 RX ORDER — SODIUM CHLORIDE 9 MG/ML
INJECTION, SOLUTION INTRAVENOUS CONTINUOUS
Status: DISCONTINUED | OUTPATIENT
Start: 2017-10-08 | End: 2017-10-08

## 2017-10-08 RX ORDER — HEPARIN SODIUM 5000 [USP'U]/ML
5000 INJECTION, SOLUTION INTRAVENOUS; SUBCUTANEOUS EVERY 8 HOURS
Status: DISCONTINUED | OUTPATIENT
Start: 2017-10-08 | End: 2017-10-10 | Stop reason: HOSPADM

## 2017-10-08 RX ADMIN — HEPARIN SODIUM 5000 UNITS: 5000 INJECTION, SOLUTION INTRAVENOUS; SUBCUTANEOUS at 09:10

## 2017-10-08 RX ADMIN — OXYCODONE HYDROCHLORIDE AND ACETAMINOPHEN 1 TABLET: 5; 325 TABLET ORAL at 09:10

## 2017-10-08 RX ADMIN — SODIUM CHLORIDE: 0.9 INJECTION, SOLUTION INTRAVENOUS at 07:10

## 2017-10-08 RX ADMIN — CLONAZEPAM 2 MG: 1 TABLET ORAL at 08:10

## 2017-10-08 RX ADMIN — CEFEPIME HYDROCHLORIDE 2 G: 2 INJECTION, SOLUTION INTRAVENOUS at 08:10

## 2017-10-08 RX ADMIN — CEFEPIME HYDROCHLORIDE 2 G: 2 INJECTION, SOLUTION INTRAVENOUS at 04:10

## 2017-10-08 RX ADMIN — HYDROMORPHONE HYDROCHLORIDE 0.5 MG: 1 INJECTION, SOLUTION INTRAMUSCULAR; INTRAVENOUS; SUBCUTANEOUS at 06:10

## 2017-10-08 RX ADMIN — VANCOMYCIN HYDROCHLORIDE 750 MG: 750 INJECTION, POWDER, LYOPHILIZED, FOR SOLUTION INTRAVENOUS at 01:10

## 2017-10-08 RX ADMIN — CALCIUM 500 MG: 500 TABLET ORAL at 08:10

## 2017-10-08 NOTE — PROGRESS NOTES
Paged neurosurgery in regards to patients SpO2 dropping below 90% while on NC and fever of 101.8. STAT chest XRAY and EKG ordered. Tylenol administered for fever. Patient maintaining SpO2 93% on 3L NC, no SOB reported. Will continue to monitor.

## 2017-10-08 NOTE — PLAN OF CARE
Problem: Occupational Therapy Goal  Goal: Occupational Therapy Goal  Goals to be met by: 10/12     Patient will increase functional independence with ADLs by performing:    Pt/CG demo understanding for spinal precautions with bed mobility.   Supine to sit with Stand-by Assistance.  MET 10/8/2017  -Revised: Supine to sit with supervision  Donning LSO while seated EOB with Minimal Assistance.  LE Dressing with Minimal Assistance and Assistive Devices as needed.  MET 10/8/2017 (for socks)  Stand pivot to chair with CGA while maintaining R LE NWB status.   Grooming while standing at sink with Minimal Assistance while maintaining R LE NWB status.  MET 10/8/2017  -Revised:  Grooming while standing at sink with supervision        Pt met three goals this date and is making progress towards others.    JOÃO Love  10/8/2017

## 2017-10-08 NOTE — PT/OT/SLP PROGRESS
"Occupational Therapy  Treatment    Candy Blakely   MRN: [id number]   Admitting Diagnosis: Burst fracture of lumbar vertebra    OT Date of Treatment: 10/08/17   OT Start Time: 0851  OT Stop Time: 0915  OT Total Time (min): 24 min    Billable Minutes:  Self Care/Home Management 10 and Therapeutic Activity 14    General Precautions: Standard, fall  Orthopedic Precautions: RLE non weight bearing  Braces: LSO       Subjective:  Communicated with RN prior to session.  "I'm feeling a lot stronger today."    Pain/Comfort  Pain Rating 1: 6/10  Location - Side 1:  (back and right hip)  Pain Addressed 1: Reposition, Distraction  Pain Rating Post-Intervention 1: 6/10    Objective:  Patient found with: peripheral IV, telemetry; LSO brace on     Functional Mobility:  Bed Mobility:  Rolling/Turning to Left: Stand by assistance (with HOB elevated)  Scooting/Bridging: Stand by Assistance  Supine to Sit: Stand by Assistance  Sit to Supine: Stand by Assistance    Transfers:  Sit <> Stand Assistance: Stand By Assistance x 2 trials from EOB  Sit <> Stand Assistive Device: Rolling Walker  Toilet Transfer Assistance: Stand By Assistance x 1 trial from C  Toilet Transfer Assistive Device: Rolling Walker    Functional Ambulation: Pt ambulated 5 ft within room with SBA and RW.  No instances of LOB noted.      Activities of Daily Living:  Grooming:  SBA for brushing teeth and washing face.  Cues given to bring rinsing dish up towards mouth as opposed to bending forward to allow adherence to spinal precautions.  Pt verbalized understanding.      Balance:   Static Sit: NORMAL: No deviations seen in posture held statically  Dynamic Sit: GOOD+: Maintains balance through MAXIMAL excursions of active trunk motion  Static Stand: FAIR+: Takes MINIMAL challenges from all directions  Dynamic stand: FAIR+: Needs CLOSE SUPERVISION during gait and is able to right self with minor LOB    Therapeutic Activities and Exercises:  *Pt stood at sink to " "perform grooming ADLs as noted above.    *Pt practiced transferring from bed to BSC.  After returning to seated position pt expressed that she was feeling fatigued, but was agreeable to performing a few exercises before lying down.   *Pt sat EOB and performed 3 bilateral UE exercises to address endurance needed for ADLs: 2 sets x 20 reps.  In the mid  -Shoulder flexion to ~90 degrees  -Forward Rows  -Arm bike  *POC reviewed with pt    AM-PAC 6 CLICK ADL   How much help from another person does this patient currently need?   1 = Unable, Total/Dependent Assistance  2 = A lot, Maximum/Moderate Assistance  3 = A little, Minimum/Contact Guard/Supervision  4 = None, Modified [place]/Independent    Putting on and taking off regular lower body clothing? : 3  Bathing (including washing, rinsing, drying)?: 2  Toileting, which includes using toilet, bedpan, or urinal? : 3  Putting on and taking off regular upper body clothing?: 3  Taking care of personal grooming such as brushing teeth?: 4  Eating meals?: 4  Total Score: 19     AM-PAC Raw Score CMS "G-Code Modifier Level of Impairment Assistance   6 % Total / Unable   7 - 8 CM 80 - 100% Maximal Assist   9-13 CL 60 - 80% Moderate Assist   14 - 19 CK 40 - 60% Moderate Assist   20 - 22 CJ 20 - 40% Minimal Assist   23 CI 1-20% SBA / CGA   24 CH 0% Independent/ Mod I       Patient left supine with all lines intact and call button in reach    ASSESSMENT:  Candy Blakely is a 47 y.o. female with a medical diagnosis of Burst fracture of lumbar vertebra and presents with decreased endurance and pain in back and left hip impacting performance with ADLs and mobility.  Pt demonstrated improvement with bed mobility, grooming ADLs in standing, and with all transfers performing all with SBA.  Pt is making significant progress towards goals, but continues to display decreased activity tolerance and would continue to benefit from skilled OT services to address problems listed " below and increase independence with ADLs.  Pt remains strong candidate for rehab 2* highly motivated and able to tolerate 3 hours of therapy a day.      Rehab identified problem list/impairments: Rehab identified problem list/impairments: impaired endurance, impaired self care skills, impaired functional mobilty, orthopedic precautions, impaired balance    Rehab potential is good.    Activity tolerance: Good    Discharge recommendations: Discharge Facility/Level Of Care Needs: rehabilitation facility     Barriers to discharge: Barriers to Discharge: Inaccessible home environment, Decreased caregiver support    Equipment recommendations: wheelchair, bedside commode, walker, rolling     GOALS:    Occupational Therapy Goals        Problem: Occupational Therapy Goal    Goal Priority Disciplines Outcome Interventions   Occupational Therapy Goal     OT, PT/OT Ongoing (interventions implemented as appropriate)    Description:  Goals to be met by: 10/12     Patient will increase functional independence with ADLs by performing:    Pt/CG demo understanding for spinal precautions with bed mobility.   Supine to sit with Stand-by Assistance.  MET 10/8/2017  -Revised: Supine to sit with supervision  Donning LSO while seated EOB with Minimal Assistance.  LE Dressing with Minimal Assistance and Assistive Devices as needed.  MET 10/8/2017 (for socks)  Stand pivot to chair with CGA while maintaining R LE NWB status.   Grooming while standing at sink with Minimal Assistance while maintaining R LE NWB status.  MET 10/8/2017  -Revised:  Grooming while standing at sink with supervision                        Plan:  Patient to be seen 4 x/week to address the above listed problems via self-care/home management, therapeutic activities, neuromuscular re-education, therapeutic exercises  Plan of Care expires: 11/03/17  Plan of Care reviewed with: patient         JOÃO Love  10/08/2017

## 2017-10-08 NOTE — PLAN OF CARE
Problem: Patient Care Overview  Goal: Plan of Care Review  POC reviewed with patient, understanding verbalized. Patient had two episodes overnight where SpO2 dropped into 60's, patient denied SOB, did not present with labored breathing. Room cold and acrylics on nails, attempted pulse ox on ear and 2LNC, maintained sats rest of night. Using bedside commode, requires assistx1. Pain medication given once for hip pain, treatment effective. Fever spiked to 103, tylenol given and effective, STAT chest XRAY and EKG ordered.

## 2017-10-08 NOTE — PROGRESS NOTES
[facility]  Neurosurgery  Progress Note    Subjective:     History of Present Illness: Candy Blakely is a 47 y.o. female PMH substance abuse who presents s/p approximate 30-foot fall from a tree while intoxicated today. Pt presented with complaints of low back and pelvic pain. Plain imaging revealed fractures of the right inferior pubic ramus, right navicula, and severe burst fracture of L2. CT revealed aforementioned burst fracture with centrally displaced bone fragments and severe cord compression. Neurosurgery was consulted for evaluation.    Post-Op Info:  Procedure(s) (LRB):  DIRECT LATERAL INTERBODY FUSION/Lateral L2 corpectomy with L1-3 fusion (N/A)   4 Days Post-Op     Febrile overnight, started on broad spectrum antibiotics, pt remains neurologically intact on exam with no subjective complaints.    Assessment/Plan:     * Burst fracture of lumbar vertebra    47 y.o. female s/p 30-ft fall from tree with L2 burst fracture with central retropulsion, now s/p lateral L2 corpectomy and L1-3 posterior fusion.    - Bcx, UA, Ucx, CXR ordered for hypotension, tachycardia. O2 sats wnl. Afebrile, though reported 100.1F overnight. CTM.  - Will remove drains today.  - LSO brace when standing or OOB.  - Urinary retention: Resolved.  - Ortho: WBAT. Navicular fracture splinted, no surgical intervention warranted.  - General surgery following, no evidence for abd injury on CT & exam without concern for abd injury  - Pending rehab.            --Acetaminophen responsive fever overnight.  --Pt remains neurologically stable.  --Infectious workup thus far negative.  --Will discontinue broad spectrum abx.  --Will discontinue IJ CVC.  --We will continue to monitor closely, please contact us with any questions or concerns.    Horace Islas MD  Neurosurgery  [facility]

## 2017-10-08 NOTE — PROGRESS NOTES
"Alerted by telemetry that patients HR was in the 130s and SpO2 65%.   Went to room immediately, patient on bedside commode having BM. Denied SOB. Checked SpO2 with different device on different finger, reading remained 65%, hands only slightly cool. 2L NC applied, SpO2 increased to 98%. Patient said she had "coughing fit" and believes "that is what caused her oxygen to drop". Will slowly wean O2, continuing to monitor.   "

## 2017-10-09 LAB
ANION GAP SERPL CALC-SCNC: 9 MMOL/L
BASOPHILS # BLD AUTO: 0.02 K/UL
BASOPHILS NFR BLD: 0.4 %
BUN SERPL-MCNC: 7 MG/DL
CALCIUM SERPL-MCNC: 8.6 MG/DL
CHLORIDE SERPL-SCNC: 102 MMOL/L
CO2 SERPL-SCNC: 26 MMOL/L
CREAT SERPL-MCNC: 0.6 MG/DL
DIFFERENTIAL METHOD: ABNORMAL
EOSINOPHIL # BLD AUTO: 0.2 K/UL
EOSINOPHIL NFR BLD: 3.7 %
ERYTHROCYTE [DISTWIDTH] IN BLOOD BY AUTOMATED COUNT: 14.2 %
EST. GFR  (AFRICAN AMERICAN): >60 ML/MIN/1.73 M^2
EST. GFR  (NON AFRICAN AMERICAN): >60 ML/MIN/1.73 M^2
GLUCOSE SERPL-MCNC: 98 MG/DL
HCT VFR BLD AUTO: 33.5 %
HGB BLD-MCNC: 11.1 G/DL
LYMPHOCYTES # BLD AUTO: 1.8 K/UL
LYMPHOCYTES NFR BLD: 31.5 %
MCH RBC QN AUTO: 29.8 PG
MCHC RBC AUTO-ENTMCNC: 33.1 G/DL
MCV RBC AUTO: 90 FL
MONOCYTES # BLD AUTO: 0.7 K/UL
MONOCYTES NFR BLD: 11.7 %
NEUTROPHILS # BLD AUTO: 2.9 K/UL
NEUTROPHILS NFR BLD: 52.2 %
PLATELET # BLD AUTO: 303 K/UL
PMV BLD AUTO: 9.3 FL
POTASSIUM SERPL-SCNC: 3.6 MMOL/L
RBC # BLD AUTO: 3.72 M/UL
SODIUM SERPL-SCNC: 137 MMOL/L
WBC # BLD AUTO: 5.62 K/UL

## 2017-10-09 PROCEDURE — 25000003 PHARM REV CODE 250: Performed by: STUDENT IN AN ORGANIZED HEALTH CARE EDUCATION/TRAINING PROGRAM

## 2017-10-09 PROCEDURE — 87040 BLOOD CULTURE FOR BACTERIA: CPT

## 2017-10-09 PROCEDURE — 36415 COLL VENOUS BLD VENIPUNCTURE: CPT

## 2017-10-09 PROCEDURE — 94640 AIRWAY INHALATION TREATMENT: CPT

## 2017-10-09 PROCEDURE — 97530 THERAPEUTIC ACTIVITIES: CPT

## 2017-10-09 PROCEDURE — 20600001 HC STEP DOWN PRIVATE ROOM

## 2017-10-09 PROCEDURE — 63600175 PHARM REV CODE 636 W HCPCS: Performed by: STUDENT IN AN ORGANIZED HEALTH CARE EDUCATION/TRAINING PROGRAM

## 2017-10-09 PROCEDURE — 80048 BASIC METABOLIC PNL TOTAL CA: CPT

## 2017-10-09 PROCEDURE — 25000003 PHARM REV CODE 250: Performed by: PHYSICIAN ASSISTANT

## 2017-10-09 PROCEDURE — 94799 UNLISTED PULMONARY SVC/PX: CPT

## 2017-10-09 PROCEDURE — 94761 N-INVAS EAR/PLS OXIMETRY MLT: CPT

## 2017-10-09 PROCEDURE — 25000242 PHARM REV CODE 250 ALT 637 W/ HCPCS: Performed by: PHYSICIAN ASSISTANT

## 2017-10-09 PROCEDURE — 99900035 HC TECH TIME PER 15 MIN (STAT)

## 2017-10-09 PROCEDURE — 85025 COMPLETE CBC W/AUTO DIFF WBC: CPT

## 2017-10-09 PROCEDURE — 63600175 PHARM REV CODE 636 W HCPCS: Performed by: NEUROLOGICAL SURGERY

## 2017-10-09 PROCEDURE — 25000003 PHARM REV CODE 250: Performed by: NEUROLOGICAL SURGERY

## 2017-10-09 PROCEDURE — 99232 SBSQ HOSP IP/OBS MODERATE 35: CPT | Mod: ,,, | Performed by: NURSE PRACTITIONER

## 2017-10-09 RX ORDER — IPRATROPIUM BROMIDE AND ALBUTEROL SULFATE 2.5; .5 MG/3ML; MG/3ML
3 SOLUTION RESPIRATORY (INHALATION)
Status: DISCONTINUED | OUTPATIENT
Start: 2017-10-09 | End: 2017-10-10 | Stop reason: HOSPADM

## 2017-10-09 RX ADMIN — OXYCODONE HYDROCHLORIDE AND ACETAMINOPHEN 1 TABLET: 5; 325 TABLET ORAL at 09:10

## 2017-10-09 RX ADMIN — PANTOPRAZOLE SODIUM 40 MG: 40 TABLET, DELAYED RELEASE ORAL at 09:10

## 2017-10-09 RX ADMIN — CALCIUM 500 MG: 500 TABLET ORAL at 09:10

## 2017-10-09 RX ADMIN — HEPARIN SODIUM 5000 UNITS: 5000 INJECTION, SOLUTION INTRAVENOUS; SUBCUTANEOUS at 05:10

## 2017-10-09 RX ADMIN — HEPARIN SODIUM 5000 UNITS: 5000 INJECTION, SOLUTION INTRAVENOUS; SUBCUTANEOUS at 02:10

## 2017-10-09 RX ADMIN — HYDROMORPHONE HYDROCHLORIDE 0.5 MG: 1 INJECTION, SOLUTION INTRAMUSCULAR; INTRAVENOUS; SUBCUTANEOUS at 11:10

## 2017-10-09 RX ADMIN — IPRATROPIUM BROMIDE AND ALBUTEROL SULFATE 3 ML: .5; 3 SOLUTION RESPIRATORY (INHALATION) at 08:10

## 2017-10-09 RX ADMIN — CLONAZEPAM 2 MG: 1 TABLET ORAL at 09:10

## 2017-10-09 RX ADMIN — HYDROMORPHONE HYDROCHLORIDE 0.5 MG: 1 INJECTION, SOLUTION INTRAMUSCULAR; INTRAVENOUS; SUBCUTANEOUS at 02:10

## 2017-10-09 RX ADMIN — DIAZEPAM 5 MG: 5 TABLET ORAL at 09:10

## 2017-10-09 RX ADMIN — HYDROMORPHONE HYDROCHLORIDE 0.5 MG: 1 INJECTION, SOLUTION INTRAMUSCULAR; INTRAVENOUS; SUBCUTANEOUS at 12:10

## 2017-10-09 RX ADMIN — IPRATROPIUM BROMIDE AND ALBUTEROL SULFATE 3 ML: .5; 3 SOLUTION RESPIRATORY (INHALATION) at 03:10

## 2017-10-09 RX ADMIN — HEPARIN SODIUM 5000 UNITS: 5000 INJECTION, SOLUTION INTRAVENOUS; SUBCUTANEOUS at 09:10

## 2017-10-09 RX ADMIN — BISACODYL 10 MG: 10 SUPPOSITORY RECTAL at 09:10

## 2017-10-09 RX ADMIN — HYDROMORPHONE HYDROCHLORIDE 0.5 MG: 1 INJECTION, SOLUTION INTRAMUSCULAR; INTRAVENOUS; SUBCUTANEOUS at 09:10

## 2017-10-09 RX ADMIN — OXYCODONE HYDROCHLORIDE AND ACETAMINOPHEN 1 TABLET: 5; 325 TABLET ORAL at 05:10

## 2017-10-09 NOTE — PLAN OF CARE
Problem: Physical Therapy Goal  Goal: Physical Therapy Goal  Goals to be met by: 10/15/2017     Patient will increase functional independence with mobility by performin. Supine to sit with Mod (I) (SBA met 10/9)  2. Sit to supine with Stand-by Assistance  3. Sit to stand transfer with Contact Guard Assistance  4. Bed to chair transfer with SBA (CGA met 10/6)  5. Gait x40 feet with Minimal Assistance using Rolling Walker (x10 meet with RW and Min A met 10/9)  6. Lower extremity exercise program x15 reps per handout, with independence  7. Propel w/c with B UE for 200ft Supervision      Outcome: Ongoing (interventions implemented as appropriate)    Goals updated and appropriate to reflect pt's current mobility needs. Pt is progressing quickly. Recommend CM/SW discuss options for D/C with pt and pt's significant other.    Natasha Arnold, PT, DPT  [phone]  10/9/2017

## 2017-10-09 NOTE — ASSESSMENT & PLAN NOTE
-  S/p fall  -  CT/MRI lumbar spine showed L2 burst fracture with central retropulsion resulting in severe spinal stenosis.    -  Neurosurgery consulted.  Now, s/p lateral L2 corpectomy with L1-L3 posterior fusion on 10/4/17.  -  Elevated WBC, tachycardic, hypotensive on 10/6/17--> Blood CX, UA, urine CX unremarkable (10/6/17).  Infectious workup negative, fever resolved, WBC normal- discontinued empiric antibiotics.     Recommendations  -  Encourage mobility, OOB in chair at least 3 hours per day, and early ambulation as appropriate  -  PT/OT evaluate and treat  -  Monitor for bowel and bladder dysfunction- marsh previously placed for urinary retention, now d/c  -  Monitor for spasticity  -  Monitor for and prevent skin breakdown and pressure ulcers  · Early mobility, repositioning/weight shifting every 20-30 minutes when sitting, turn patient every 2 hours, proper mattress/overlay and chair cushioning, pressure relief/heel protector boots  -  DVT prophylaxis:  AZEEM, SCD  -  Reviewed discharge options (IP rehab, SNF, HH therapy, and OP therapy)

## 2017-10-09 NOTE — PLAN OF CARE
Planned discharge is Rehab - Plan (A) or home with family - Plan (B).     10/09/17 1206   Discharge Reassessment   Assessment Type Discharge Planning Reassessment   Provided patient/caregiver education on the expected discharge date and the discharge plan No   Do you have any problems affording any of your prescribed medications? No   Discharge Plan A Rehab   Discharge Plan B Home with family   Patient choice form signed by patient/caregiver N/A   Can the patient answer the patient profile reliably? Yes, cognitively intact   How does the patient rate their overall health at the present time? Good   Describe the patient's ability to walk at the present time. Major restrictions/daily assistance from another person   How often would a person be available to care for the patient? Often   Number of comorbid conditions (as recorded on the chart) Two   During the past month, has the patient often been bothered by feeling down, depressed or hopeless? No   During the past month, has the patient often been bothered by little interest or pleasure in doing things? No

## 2017-10-09 NOTE — SUBJECTIVE & OBJECTIVE
"Interval History: Afebrile overnight. Continues to do well with therapy. Unable to obtain sputum cx due to nonproductive cough that has been present since 2 weeks prior to admission. However, cough has improved with chest physio and duonebs.     Medications:  Continuous Infusions:     Scheduled Meds:   bisacodyl  10 mg Rectal Daily    calcium carbonate  500 mg Oral Daily    clonazePAM  2 mg Oral QHS    heparin (porcine)  5,000 Units Subcutaneous Q8H    pantoprazole  40 mg Oral Daily     PRN Meds:acetaminophen, aluminum-magnesium hydroxide-simethicone, dextrose 50%, dextrose 50%, diazePAM, glucagon (human recombinant), glucose, glucose, glucose, HYDROmorphone, labetalol, ondansetron, oxycodone-acetaminophen, promethazine (PHENERGAN) IVPB, senna-docusate 8.6-50 mg     Review of Systems    Objective:     Weight: 54.6 kg (120 lb 4.8 oz)  Body mass index is 22.73 kg/m².  Vital Signs (Most Recent):  Temp: 97.7 °F (36.5 °C) (10/09/17 1044)  Pulse: 103 (10/09/17 1100)  Resp: 18 (10/09/17 1044)  BP: 113/64 (10/09/17 1044)  SpO2: (!) 90 % (10/09/17 1044) Vital Signs (24h Range):  Temp:  [97.7 °F (36.5 °C)-99.8 °F (37.7 °C)] 97.7 °F (36.5 °C)  Pulse:  [] 103  Resp:  [16-18] 18  SpO2:  [90 %-97 %] 90 %  BP: (113-124)/(56-69) 113/64                           Urethral Catheter 10/04/17 0910 Non-latex 16 Fr. (Active)   Site Assessment Clean;Intact 10/4/2017 11:57 AM   Collection Container Leg bag 10/4/2017 11:57 AM   Securement Method secured to top of thigh w/ adhesive device 10/4/2017 11:57 AM   Catheter Care Performed yes 10/4/2017  9:10 AM   Reason for Continuing Urinary Catheterization Urinary retention 10/4/2017  9:10 AM   CAUTI Prevention Bundle StatLock in place w 1" slack;Intact seal between catheter & drainage tubing;Drainage bag off the floor;Green sheeting clip in use;No dependent loops or kinks;Drainage bag not overfilled (<2/3 full);Drainage bag below bladder 10/4/2017  9:10 AM   Output (mL) 575 mL " 10/4/2017  9:10 AM       Neurosurgery Physical Exam     General: well developed, well nourished, no distress  Head: normocephalic, atraumatic  Neurologic: Alert and oriented. Thought content appropriate  GCS: Motor: 6/Verbal: 5/Eyes: 4 GCS Total: 15  Mental Status: Awake, Alert, Oriented x 4  Language: No aphasia  Speech: No dysarthria  Cranial nerves: face symmetric, tongue midline, CN II-XII grossly intact.   Eyes: pupils equal, round, reactive to light with accommodation, EOMI  Pulmonary: normal respirations, not labored, no accessory muscles used  Abdomen: soft, non-distended, not tender to palpation  Sensory: intact to light touch throughout  Motor Strength: Moves all extremities spontaneously with good tone.   No abnormal movements seen.     Strength  Deltoids Triceps Biceps Wrist Extension Wrist Flexion Hand    Upper: R 5/5 5/5 5/5 5/5 5/5 5/5    L 5/5 5/5 5/5 5/5 5/5 5/5     Iliopsoas Quadriceps Knee  Flexion Tibialis  anterior Gastro- cnemius EHL   Lower: R 4/5 4+/5 5/5 N/A N/A N/A    L 5/5 5/5 5/5 5/5 5/5 5/5     Unable to fully examine RLE due to large splint to knee, can wiggle toes & at least 3/5 HF  Pronator Drift: no drift noted  Finger-to-nose: Intact bilaterally  Lord: absent  Clonus: absent on left, unable to assess on right due to R splint  Babinski: absent on left, unable to assess on right due to R splint  Pulses: 2+ and symmetric radial and dorsalis pedis  Skin: warm, dry and intact, no rashes    Significant Labs:    Recent Labs  Lab 10/08/17  0527 10/09/17  0515   GLU 97 98    137   K 4.2 3.6    102   CO2 29 26   BUN 9 7   CREATININE 0.7 0.6   CALCIUM 8.9 8.6*       Recent Labs  Lab 10/08/17  0527 10/09/17  0515   WBC 6.40 5.62   HGB 11.7* 11.1*   HCT 35.1* 33.5*    303     No results for input(s): LABPT, INR, APTT in the last 48 hours.  Microbiology Results (last 7 days)     Procedure Component Value Units Date/Time    Blood culture [[id number]] Collected:  10/09/17  1046    Order Status:  Sent Specimen:  Blood Updated:  10/09/17 1052    Blood culture [[id number]] Collected:  10/09/17 1047    Order Status:  Sent Specimen:  Blood Updated:  10/09/17 1052    Blood culture [[id number]] Collected:  10/06/17 2245    Order Status:  Completed Specimen:  Blood from Line, Jugular, Internal Right Updated:  10/09/17 0612     Blood Culture, Routine No Growth to date     Blood Culture, Routine No Growth to date     Blood Culture, Routine No Growth to date    Blood culture [[id number]] Collected:  10/06/17 2243    Order Status:  Completed Specimen:  Blood Updated:  10/09/17 0612     Blood Culture, Routine No Growth to date     Blood Culture, Routine No Growth to date     Blood Culture, Routine No Growth to date    Narrative:       Aerobic and anaerobic, draw from peripheral and ij cvc please  Collection has been rescheduled by CAB2 at 10/6/2017 22:05   Collection has been rescheduled by CAB2 at 10/6/2017 22:06 Reason:   Nurse Draw  Collection has been rescheduled by CAB2 at 10/6/2017 22:05   Collection has been rescheduled by CAB2 at 10/6/2017 22:06 Reason:   Nurse Draw    Blood culture [[id number]] Collected:  10/07/17 0018    Order Status:  Completed Specimen:  Blood Updated:  10/09/17 0452     Blood Culture, Routine Gram stain aer bottle: Gram positive rods      Blood Culture, Routine Results called to and read back by:Xena Sherwood RN 10/09/2017  04:51    Narrative:       Aerobic and anaerobic, draw from peripheral and ij cvc please  Collection has been rescheduled by CAB2 at 10/6/2017 22:05   Collection has been rescheduled by CAB2 at 10/6/2017 22:06 Reason:   Nurse Draw  Collection has been rescheduled by CDP at 10/7/2017 00:16   Collection has been rescheduled by CAB2 at 10/6/2017 22:05   Collection has been rescheduled by CAB2 at 10/6/2017 22:06 Reason:   Nurse Draw  Collection has been rescheduled by CDP at 10/7/2017 00:16     Blood culture [[id number]] Collected:  10/06/17 1543    Order  Status:  Completed Specimen:  Blood Updated:  10/08/17 2012     Blood Culture, Routine No Growth to date     Blood Culture, Routine No Growth to date     Blood Culture, Routine No Growth to date    Culture, Respiratory with Gram Stain [[id number]]     Order Status:  No result Specimen:  Respiratory     Urine culture [[id number]] Collected:  10/06/17 2244    Order Status:  Completed Specimen:  Urine from Urine, Clean Catch Updated:  10/08/17 0000     Urine Culture, Routine No growth    Urine culture [[id number]] Collected:  10/06/17 2248    Order Status:  Sent Specimen:  Urine from Urine, Clean Catch Updated:  10/06/17 2248    Blood culture [[id number]]     Order Status:  Canceled Specimen:  Blood           Significant Diagnostics:  I personally reviewed MRI Lumbar spine & agree with findings: Acute compression/burst fracture of the L2 vertebral body with extension into the left pedicle as detailed above. Retropulsion of fracture fragments with mass effect upon the thecal sac resulting in severe spinal canal stenosis at this level. Small amount of anterior epidural hemorrhage.    Nondisplaced fracture involving the L1 vertebral body, without significant height loss or retropulsion of fracture fragments.

## 2017-10-09 NOTE — PLAN OF CARE
Problem: Patient Care Overview  Goal: Plan of Care Review  Outcome: Ongoing (interventions implemented as appropriate)  POC reviewed with patient, understanding verbalized. Patient only slightly febrile throughout shift, did not require tylenol. PRN pain medications utilized, treatment effective. Brace and cast remain intact and in place, patient compliant with proper rolling technique and body mechanics to protect back, leg and pelvis. SpO2 remained above 92% on room air, did not complain of cough thus far this shift. VSS, all safety precautions maintained, will continue to monitor.

## 2017-10-09 NOTE — PROGRESS NOTES
Paged neurosurgery in regards to positive blood culture result. Received call from lab at 0450, gram positive rods in culture drawn 10/7/17
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
## 2017-10-09 NOTE — PT/OT/SLP PROGRESS
"Physical Therapy  Treatment    Candy Blakely   MRN: [id number]   Admitting Diagnosis: Burst fracture of lumbar vertebra    PT Received On: 10/09/17  PT Start Time: 0833     PT Stop Time: 0900    PT Total Time (min): 27 min       Billable Minutes:  Therapeutic Activity 24    Treatment Type: Treatment  PT/PTA: PT       General Precautions: Standard, fall  Orthopedic Precautions: RLE non weight bearing   Braces: LSO    Subjective:  Communicated with RN (Ymei) prior to session.    "I was having some pain this morning. I would love to put on some deodorant. I will sit up to eat. I know I should be up and practicing."    Pain/Comfort  Pain Rating 1: 6/10  Location 1: back  Pain Addressed 1: Reposition, Distraction, Cessation of Activity  Pain Rating Post-Intervention 1: 6/10    Objective:   Patient found with: telemetry    Functional Mobility:  Bed Mobility:   Rolling/Turning to Left: Stand by assistance  Scooting/Bridging: Stand by Assistance  Supine to Sit: Stand by Assistance  Sit to Supine:  (pt remained UIC)    Transfers:  Sit <> Stand Assistance: Minimum Assistance  Sit <> Stand Assistive Device: Rolling Walker  Bed <> Chair Technique: Stand Pivot  Bed <> Chair Assistance: Contact Guard Assistance  Bed <> Chair Assistive Device: Rolling Walker    Gait:   Gait Distance: Pt ambulated x20 feet in hospital room from EOB to bathroom door; pt req standing rest break following x10 feet 2/2 fatigue to B UE due to maintain R LE NWB. Pt able to return to sitting in bedside chair with minimal SOB.  Assistance 1: Contact Guard Assistance, Minimum assistance  Gait Assistive Device: Rolling walker  Gait Pattern: non-weightbearing (to R LE)  Gait Deviation(s): decreased bam, decreased weight-shifting ability, forward lean    Balance:   Static Sit: GOOD-: Takes MODERATE challenges from all directions but inconsistently  Dynamic Sit: GOOD-: Maintains balance through MODERATE excursions of active trunk movement,     Static " Stand: FAIR: Maintains without assist but unable to take challenges  Dynamic stand: FAIR: Needs CONTACT GUARD during gait while using RW    Therapeutic Activities and Exercises:  PT arrived to pt's room to find pt resting quietly; agreeable to PT session. Pt performed mobility as above. Agreeable to sit EOB for changing gown/self care. PT and pt reviewed donning and doffing brace safely including technique, placement, and adjustments. Pt educated on proper use of bedrail/RW to come to standing to decrease work of task. PT reviewed recommendations for rehab and need for assist with transfers in room. RN and PCT alerted of pt's status. Questions/concerns addressed within PT scope of practice; pt and RN with no further questions.    AM-PAC 6 CLICK MOBILITY  How much help from another person does this patient currently need?   1 = Unable, Total/Dependent Assistance  2 = A lot, Maximum/Moderate Assistance  3 = A little, Minimum/Contact Guard/Supervision  4 = None, Modified [place]/Independent    Turning over in bed (including adjusting bedclothes, sheets and blankets)?: 4  Sitting down on and standing up from a chair with arms (e.g., wheelchair, bedside commode, etc.): 3  Moving from lying on back to sitting on the side of the bed?: 4  Moving to and from a bed to a chair (including a wheelchair)?: 3  Need to walk in hospital room?: 3  Climbing 3-5 steps with a railing?: 2  Total Score: 19    AM-PAC Raw Score CMS G-Code Modifier Level of Impairment Assistance   6 % Total / Unable   7 - 9 CM 80 - 100% Maximal Assist   10 - 14 CL 60 - 80% Moderate Assist   15 - 19 CK 40 - 60% Moderate Assist   20 - 22 CJ 20 - 40% Minimal Assist   23 CI 1-20% SBA / CGA   24 CH 0% Independent/ Mod I     Patient left up in chair with all lines intact, call button in reach and RN notified.    Assessment:  Candy Blakely is a 47 y.o. female with a medical diagnosis of Burst fracture of lumbar vertebra and presents with good  participation with PT. Pt continues to req assist from another person for OOB activity 2/2 high fall risk given NWB to R LE and need for B UE for all transitional tasks at this time. Pt tolerated ambulation x20 feet this date prior to reporting need to return to sitting. Patient will benefit from continued skilled PT services to address the above and below impairments:    Rehab identified problem list/impairments: Rehab identified problem list/impairments: weakness, impaired endurance, impaired self care skills, impaired functional mobilty, gait instability, impaired balance, pain, decreased lower extremity function, decreased upper extremity function, impaired skin, impaired coordination    Rehab potential is good.    Activity tolerance: Good    Discharge Facility/Level Of Care Needs: rehabilitation facility(pt is progressing quickly; recommend CM/SW discuss options for discharge with pt)    Barriers to discharge: Barriers to Discharge: Inaccessible home environment, Decreased caregiver support    Equipment recommendations: Equipment Needed After Discharge: bedside commode, wheelchair, walker, rolling     GOALS:    Physical Therapy Goals        Problem: Physical Therapy Goal    Goal Priority Disciplines Outcome Goal Variances Interventions   Physical Therapy Goal     PT/OT, PT Ongoing (interventions implemented as appropriate)     Description:  Goals to be met by: 10/15/2017     Patient will increase functional independence with mobility by performin. Supine to sit with Mod (I) (SBA met 10/9)  2. Sit to supine with Stand-by Assistance  3. Sit to stand transfer with Contact Guard Assistance  4. Bed to chair transfer with SBA (CGA met 10/6)  5. Gait x40 feet with Minimal Assistance using Rolling Walker (x10 meet with RW and Min A met 10/9)  6. Lower extremity exercise program x15 reps per handout, with independence  7. Propel w/c with B UE for 200ft Supervision                        PLAN:    Patient to be  seen 5 x/week  to address the above listed problems via gait training, therapeutic activities, therapeutic exercises, neuromuscular re-education  Plan of Care expires: 11/05/17  Plan of Care reviewed with: patient, friend     Natasha Arnold, PT, DPT  [phone]  10/9/2017
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
## 2017-10-09 NOTE — SUBJECTIVE & OBJECTIVE
Interval History: (10/09/2017) Patient is seen for follow-up rehab evaluation and recommendations.  No acute events over night.  Pain controlled.  Progressing with therapy.    HPI, Past Medical, Surgical, Family, and Social History remains the same as documented in the initial encounter.    Scheduled Medications:    bisacodyl  10 mg Rectal Daily    calcium carbonate  500 mg Oral Daily    clonazePAM  2 mg Oral QHS    heparin (porcine)  5,000 Units Subcutaneous Q8H    pantoprazole  40 mg Oral Daily       PRN Medications: acetaminophen, aluminum-magnesium hydroxide-simethicone, dextrose 50%, dextrose 50%, diazePAM, glucagon (human recombinant), glucose, glucose, glucose, HYDROmorphone, labetalol, ondansetron, oxycodone-acetaminophen, promethazine (PHENERGAN) IVPB, senna-docusate 8.6-50 mg    Review of Systems   Constitutional: Negative for chills, fatigue and fever.   HENT: Negative for drooling, hearing loss, trouble swallowing and voice change.    Eyes: Negative for pain and visual disturbance.   Respiratory: Negative for cough, shortness of breath and wheezing.    Cardiovascular: Negative for chest pain and palpitations.   Gastrointestinal: Negative for abdominal distention, nausea and vomiting.   Genitourinary: Negative for difficulty urinating and flank pain.   Musculoskeletal: Positive for back pain, gait problem and myalgias. Negative for arthralgias and neck pain.   Skin: Positive for wound. Negative for rash.   Neurological: Negative for dizziness, weakness, numbness and headaches.   Psychiatric/Behavioral: Negative for agitation and hallucinations. The patient is not nervous/anxious.      Objective:     Vital Signs (Most Recent):  Temp: 98.2 °F (36.8 °C) (10/09/17 0731)  Pulse: 107 (10/09/17 0731)  Resp: 17 (10/09/17 0731)  BP: 122/66 (10/09/17 0731)  SpO2: 97 % (10/09/17 0731)    Vital Signs (24h Range):  Temp:  [98 °F (36.7 °C)-100.6 °F (38.1 °C)] 98.2 °F (36.8 °C)  Pulse:  [] 107  Resp:  [16-18]  17  SpO2:  [92 %-97 %] 97 %  BP: (107-124)/(56-69) 122/66     Physical Exam   Constitutional: She is oriented to person, place, and time. She appears well-developed and well-nourished. No distress.   Appears older than stated age   HENT:   Head: Normocephalic and atraumatic.   Right Ear: External ear normal.   Left Ear: External ear normal.   Nose: Nose normal.   Eyes: Right eye exhibits no discharge. Left eye exhibits no discharge. No scleral icterus.   Neck: Normal range of motion.   Cardiovascular: Normal rate, regular rhythm and intact distal pulses.    Pulmonary/Chest: Effort normal. No respiratory distress. She has no wheezes.   Abdominal: Soft. She exhibits no distension. There is no tenderness.   Musculoskeletal: She exhibits no edema.        Right ankle: She exhibits decreased range of motion. Tenderness.   RLE with calles splint intact.  Good color and sensation.    Neurological: She is alert and oriented to person, place, and time.   -  Mental Status:  AAOx3.  Follows commands.  Answers correct age and .  Recent and remote memory intact.   -  Speech and language:  no aphasia or dysarthria.    -  Motor:  RUE: 5/5.  LUE: 5/5.  RLE: limited by pain.  LLE: 5/5.  -  Tone:  Normal.  -  Sensory:  Intact to light touch and pin prick.   Skin: Skin is warm and dry. No rash noted.   Psychiatric: She has a normal mood and affect. Her behavior is normal. Thought content normal.   Vitals reviewed.    Diagnostic Results:   Labs: Reviewed  X-Ray: Reviewed  CT: Reviewed  MRI: Reviewed    NEUROLOGICAL EXAMINATION:     MENTAL STATUS   Oriented to person, place, and time.

## 2017-10-09 NOTE — PLAN OF CARE
VY following for DC needs. VY in communication with CM.    VY spoke to Kayy with [facility]. Kayy stated that they are recommending outpatient therapy for the pt.     Per team, pt is not medically stable for DC today.     Lorri Emmanuel, DIANA  [id number]

## 2017-10-09 NOTE — PROGRESS NOTES
[facility]  Physical Medicine & Rehab  Progress Note    Patient Name: Candy Blakely  MRN: [id number]  Admission Date: 10/3/2017  Length of Stay: 6 days  Attending Physician: Toni Gasca MD  Primary Care Provider: Primary Doctor No    Subjective:     Principal Problem:Burst fracture of lumbar vertebra    Hospital Course:   10/5/17:  Evaluated by therapy.  Bed mobility min-modA.  Transfers min-maxA.  No sit to stand or gait.  UBD Lilia and LBD maxA.  10/6/17:  Participated with therapy.  Bed mobility SBA-Lilia.  Sit to stand CGA-Lilia and transfers Lilia.  No gait.  10/8/17:  Participated with OT.  Bed mobility SBA.  Sit to stand SBA and transfers SBA.  Ambulated 5 ft SBA with RW.  Grooming SBA.     Interval History: (10/09/2017) Patient is seen for follow-up rehab evaluation and recommendations.  No acute events over night.  Pain controlled.  Progressing with therapy.    HPI, Past Medical, Surgical, Family, and Social History remains the same as documented in the initial encounter.    Scheduled Medications:    bisacodyl  10 mg Rectal Daily    calcium carbonate  500 mg Oral Daily    clonazePAM  2 mg Oral QHS    heparin (porcine)  5,000 Units Subcutaneous Q8H    pantoprazole  40 mg Oral Daily       PRN Medications: acetaminophen, aluminum-magnesium hydroxide-simethicone, dextrose 50%, dextrose 50%, diazePAM, glucagon (human recombinant), glucose, glucose, glucose, HYDROmorphone, labetalol, ondansetron, oxycodone-acetaminophen, promethazine (PHENERGAN) IVPB, senna-docusate 8.6-50 mg    Review of Systems   Constitutional: Negative for chills, fatigue and fever.   HENT: Negative for drooling, hearing loss, trouble swallowing and voice change.    Eyes: Negative for pain and visual disturbance.   Respiratory: Negative for cough, shortness of breath and wheezing.    Cardiovascular: Negative for chest pain and palpitations.   Gastrointestinal: Negative for abdominal distention, nausea and vomiting.    Genitourinary: Negative for difficulty urinating and flank pain.   Musculoskeletal: Positive for back pain, gait problem and myalgias. Negative for arthralgias and neck pain.   Skin: Positive for wound. Negative for rash.   Neurological: Negative for dizziness, weakness, numbness and headaches.   Psychiatric/Behavioral: Negative for agitation and hallucinations. The patient is not nervous/anxious.      Objective:     Vital Signs (Most Recent):  Temp: 98.2 °F (36.8 °C) (10/09/17 0731)  Pulse: 107 (10/09/17 0731)  Resp: 17 (10/09/17 0731)  BP: 122/66 (10/09/17 0731)  SpO2: 97 % (10/09/17 0731)    Vital Signs (24h Range):  Temp:  [98 °F (36.7 °C)-100.6 °F (38.1 °C)] 98.2 °F (36.8 °C)  Pulse:  [] 107  Resp:  [16-18] 17  SpO2:  [92 %-97 %] 97 %  BP: (107-124)/(56-69) 122/66     Physical Exam   Constitutional: She is oriented to person, place, and time. She appears well-developed and well-nourished. No distress.   Appears older than stated age   HENT:   Head: Normocephalic and atraumatic.   Right Ear: External ear normal.   Left Ear: External ear normal.   Nose: Nose normal.   Eyes: Right eye exhibits no discharge. Left eye exhibits no discharge. No scleral icterus.   Neck: Normal range of motion.   Cardiovascular: Normal rate, regular rhythm and intact distal pulses.    Pulmonary/Chest: Effort normal. No respiratory distress. She has no wheezes.   Abdominal: Soft. She exhibits no distension. There is no tenderness.   Musculoskeletal: She exhibits no edema.        Right ankle: She exhibits decreased range of motion. Tenderness.   RLE with calles splint intact.  Good color and sensation.    Neurological: She is alert and oriented to person, place, and time.   -  Mental Status:  AAOx3.  Follows commands.  Answers correct age and .  Recent and remote memory intact.   -  Speech and language:  no aphasia or dysarthria.    -  Motor:  RUE: 5/5.  LUE: 5/5.  RLE: limited by pain.  LLE: 5/5.  -  Tone:  Normal.  -   Sensory:  Intact to light touch and pin prick.   Skin: Skin is warm and dry. No rash noted.   Psychiatric: She has a normal mood and affect. Her behavior is normal. Thought content normal.   Vitals reviewed.    Diagnostic Results:   Labs: Reviewed  X-Ray: Reviewed  CT: Reviewed  MRI: Reviewed    Assessment/Plan:      * Burst fracture of lumbar vertebra    -  S/p fall  -  CT/MRI lumbar spine showed L2 burst fracture with central retropulsion resulting in severe spinal stenosis.    -  Neurosurgery consulted.  Now, s/p lateral L2 corpectomy with L1-L3 posterior fusion on 10/4/17.  -  Elevated WBC, tachycardic, hypotensive on 10/6/17--> Blood CX, UA, urine CX unremarkable (10/6/17).  Infectious workup negative, fever resolved, WBC normal- discontinued empiric antibiotics.     Recommendations  -  Encourage mobility, OOB in chair at least 3 hours per day, and early ambulation as appropriate  -  PT/OT evaluate and treat  -  Monitor for bowel and bladder dysfunction- marsh previously placed for urinary retention, now d/c  -  Monitor for spasticity  -  Monitor for and prevent skin breakdown and pressure ulcers  · Early mobility, repositioning/weight shifting every 20-30 minutes when sitting, turn patient every 2 hours, proper mattress/overlay and chair cushioning, pressure relief/heel protector boots  -  DVT prophylaxis:  AZEEM, SCD  -  Reviewed discharge options (IP rehab, SNF, HH therapy, and OP therapy)        Trauma    -  Presented with low back and R foot pain after falling approximately 30 feet while climbing a tree in [place] anding directly on her back.    -  Denied head trauma or LOC.  CTH and cervical spine unremarkable.  -  Drug tox positive for benzodiazepines, opiates, and amphetamines.    -  Found to have R navicular fracture and R calcaneus fracture and L2 burst fracture with central retropulsion resulting in severe spinal stenosis s/p lateral L2 corpectomy with L1-L3 posterior fusion on 10/4/17.          Closed navicular fracture of right foot    -  S/p fall  -  CT and X-ray of R ankle showed R navicular fracture and R calcaneus fracture.    -  CT pelvis without acute fracture.    -  Ortho consulted and no acute orthopedic surgical intervention necessary.  Recommended NWB of RLE and placed ankle in bulky calles splint.          Progressing quickly with therapy.  Now, SBA with mobility and ADLs.  Patient without goals for inpatient rehab.  Recommend outpatient therapy.  Will sign off.  Please call with questions/concerns or re-consult if situation changes.    JORGE Herring  Department of Physical Medicine & Rehab   [facility]

## 2017-10-10 VITALS
SYSTOLIC BLOOD PRESSURE: 125 MMHG | OXYGEN SATURATION: 97 % | DIASTOLIC BLOOD PRESSURE: 62 MMHG | WEIGHT: 120.31 LBS | HEART RATE: 100 BPM | BODY MASS INDEX: 22.71 KG/M2 | RESPIRATION RATE: 17 BRPM | HEIGHT: 61 IN | TEMPERATURE: 97 F

## 2017-10-10 LAB
ANION GAP SERPL CALC-SCNC: 11 MMOL/L
BASOPHILS # BLD AUTO: 0.02 K/UL
BASOPHILS NFR BLD: 0.3 %
BUN SERPL-MCNC: 8 MG/DL
CALCIUM SERPL-MCNC: 8.5 MG/DL
CHLORIDE SERPL-SCNC: 103 MMOL/L
CO2 SERPL-SCNC: 24 MMOL/L
CREAT SERPL-MCNC: 0.6 MG/DL
DIFFERENTIAL METHOD: ABNORMAL
EOSINOPHIL # BLD AUTO: 0.3 K/UL
EOSINOPHIL NFR BLD: 4.3 %
ERYTHROCYTE [DISTWIDTH] IN BLOOD BY AUTOMATED COUNT: 14.2 %
EST. GFR  (AFRICAN AMERICAN): >60 ML/MIN/1.73 M^2
EST. GFR  (NON AFRICAN AMERICAN): >60 ML/MIN/1.73 M^2
GLUCOSE SERPL-MCNC: 112 MG/DL
HCT VFR BLD AUTO: 33.9 %
HGB BLD-MCNC: 11 G/DL
LYMPHOCYTES # BLD AUTO: 2 K/UL
LYMPHOCYTES NFR BLD: 30.8 %
MCH RBC QN AUTO: 29.5 PG
MCHC RBC AUTO-ENTMCNC: 32.4 G/DL
MCV RBC AUTO: 91 FL
MONOCYTES # BLD AUTO: 0.6 K/UL
MONOCYTES NFR BLD: 9.9 %
NEUTROPHILS # BLD AUTO: 3.5 K/UL
NEUTROPHILS NFR BLD: 54.2 %
PLATELET # BLD AUTO: 348 K/UL
PMV BLD AUTO: 9.6 FL
POTASSIUM SERPL-SCNC: 3.7 MMOL/L
RBC # BLD AUTO: 3.73 M/UL
SODIUM SERPL-SCNC: 138 MMOL/L
WBC # BLD AUTO: 6.49 K/UL

## 2017-10-10 PROCEDURE — 97530 THERAPEUTIC ACTIVITIES: CPT

## 2017-10-10 PROCEDURE — 25000003 PHARM REV CODE 250: Performed by: STUDENT IN AN ORGANIZED HEALTH CARE EDUCATION/TRAINING PROGRAM

## 2017-10-10 PROCEDURE — 25000003 PHARM REV CODE 250: Performed by: PHYSICIAN ASSISTANT

## 2017-10-10 PROCEDURE — 97116 GAIT TRAINING THERAPY: CPT

## 2017-10-10 PROCEDURE — 25000003 PHARM REV CODE 250: Performed by: NEUROLOGICAL SURGERY

## 2017-10-10 PROCEDURE — 36415 COLL VENOUS BLD VENIPUNCTURE: CPT

## 2017-10-10 PROCEDURE — 63600175 PHARM REV CODE 636 W HCPCS: Performed by: NEUROLOGICAL SURGERY

## 2017-10-10 PROCEDURE — 94640 AIRWAY INHALATION TREATMENT: CPT

## 2017-10-10 PROCEDURE — 97535 SELF CARE MNGMENT TRAINING: CPT

## 2017-10-10 PROCEDURE — 63600175 PHARM REV CODE 636 W HCPCS: Performed by: STUDENT IN AN ORGANIZED HEALTH CARE EDUCATION/TRAINING PROGRAM

## 2017-10-10 PROCEDURE — 80048 BASIC METABOLIC PNL TOTAL CA: CPT

## 2017-10-10 PROCEDURE — 85025 COMPLETE CBC W/AUTO DIFF WBC: CPT

## 2017-10-10 PROCEDURE — 94761 N-INVAS EAR/PLS OXIMETRY MLT: CPT

## 2017-10-10 PROCEDURE — 25000242 PHARM REV CODE 250 ALT 637 W/ HCPCS: Performed by: PHYSICIAN ASSISTANT

## 2017-10-10 RX ORDER — OXYCODONE AND ACETAMINOPHEN 5; 325 MG/1; MG/1
1 TABLET ORAL EVERY 4 HOURS PRN
Qty: 60 TABLET | Refills: 0 | Status: SHIPPED | OUTPATIENT
Start: 2017-10-10 | End: 2017-10-31 | Stop reason: SDUPTHER

## 2017-10-10 RX ORDER — BACITRACIN 500 [USP'U]/G
OINTMENT TOPICAL 2 TIMES DAILY
Status: DISCONTINUED | OUTPATIENT
Start: 2017-10-10 | End: 2017-10-10 | Stop reason: HOSPADM

## 2017-10-10 RX ORDER — DIAZEPAM 5 MG/1
5 TABLET ORAL EVERY 6 HOURS PRN
Qty: 60 TABLET | Refills: 0 | Status: SHIPPED | OUTPATIENT
Start: 2017-10-10 | End: 2017-11-07

## 2017-10-10 RX ADMIN — PANTOPRAZOLE SODIUM 40 MG: 40 TABLET, DELAYED RELEASE ORAL at 08:10

## 2017-10-10 RX ADMIN — OXYCODONE HYDROCHLORIDE AND ACETAMINOPHEN 1 TABLET: 5; 325 TABLET ORAL at 05:10

## 2017-10-10 RX ADMIN — IPRATROPIUM BROMIDE AND ALBUTEROL SULFATE 3 ML: .5; 3 SOLUTION RESPIRATORY (INHALATION) at 08:10

## 2017-10-10 RX ADMIN — HYDROMORPHONE HYDROCHLORIDE 0.5 MG: 1 INJECTION, SOLUTION INTRAMUSCULAR; INTRAVENOUS; SUBCUTANEOUS at 12:10

## 2017-10-10 RX ADMIN — HEPARIN SODIUM 5000 UNITS: 5000 INJECTION, SOLUTION INTRAVENOUS; SUBCUTANEOUS at 05:10

## 2017-10-10 RX ADMIN — CALCIUM 500 MG: 500 TABLET ORAL at 08:10

## 2017-10-10 RX ADMIN — OXYCODONE HYDROCHLORIDE AND ACETAMINOPHEN 1 TABLET: 5; 325 TABLET ORAL at 09:10

## 2017-10-10 NOTE — PLAN OF CARE
SW following for DC needs. SW in communication with CM.      Lorri Emmanuel, [facility]  [id number]

## 2017-10-10 NOTE — PLAN OF CARE
Problem: Physical Therapy Goal  Goal: Physical Therapy Goal  Goals to be met by: 10/15/2017     Patient will increase functional independence with mobility by performin. Supine to sit with Mod (I)  2. Sit to supine with Stand-by Assistance  3. Sit to stand transfer with Contact Guard Assistance-met   Revised: sit to stand transfer with mod (I)  4. Bed to chair transfer with SBA (CGA met 10/6)  5. Gait x40 feet with Minimal Assistance using Rolling Walker-met   Revised: Gait x 80ft with Supervision using RW   6. Lower extremity exercise program x15 reps per handout, with independence  7. Propel w/c with B UE for 200ft Supervision  8. Ascend/descend 4 steps with B HR SBA with or without appropriate AD.        Outcome: Ongoing (interventions implemented as appropriate)  Pt progressing towards goals.     TRACE SEYMOUR, PT  10/10/2017

## 2017-10-10 NOTE — PT/OT/SLP PROGRESS
Occupational Therapy  Treatment/Discharge    Candy Blakely   MRN: [id number]   Admitting Diagnosis: Burst fracture of lumbar vertebra    OT Date of Treatment: 10/10/17   OT Start Time: 1400  OT Stop Time: 1424  OT Total Time (min): 24 min    Billable Minutes:  Self Care/Home Management 15 and Therapeutic Activity 09    General Precautions: Standard, fall  Orthopedic Precautions: RLE non weight bearing  Braces: LSO    Subjective:  Communicated with RN prior to session.    Pain/Comfort  Pain Rating 1:  (no c/o pain)    Objective:  Patient found with: telemetry     Functional Mobility:  Bed Mobility:  Supine to Sit: Supervision  Sit to Supine: Supervision    Transfers:  Sit <> Stand Assistance: Stand By Assistance  Sit <> Stand Assistive Device: Rolling Walker    Toilet Transfer Technique: Stand Pivot  Toilet Transfer Assistance: Contact Guard Assistance  Toilet Transfer Assistive Device: Rolling Walker, grab bar    Functional Ambulation: bed<->bathroom SBA with RW    Activities of Daily Living:  LE Dressing Level of Assistance: Set-up Assistance to don shorts from EOB    Pt doffed/donned LSO brace EOB without cues or assistance required     Education:  Reviewed DME recommendations for home; pt reports her friend she is staying with at d/c owns a RW, bath bench, and w/c that she will be able to use  Environmental safety precautions/modifications discussed; pt verbalized understanding    AM-PAC 6 CLICK ADL   How much help from another person does this patient currently need?   1 = Unable, Total/Dependent Assistance  2 = A lot, Maximum/Moderate Assistance  3 = A little, Minimum/Contact Guard/Supervision  4 = None, Modified [place]/Independent    Putting on and taking off regular lower body clothing? : 3  Bathing (including washing, rinsing, drying)?: 3  Toileting, which includes using toilet, bedpan, or urinal? : 3  Putting on and taking off regular upper body clothing?: 4  Taking care of personal grooming such as  "brushing teeth?: 4  Eating meals?: 4  Total Score: 21     AM-PAC Raw Score CMS "G-Code Modifier Level of Impairment Assistance   6 % Total / Unable   7 - 8 CM 80 - 100% Maximal Assist   9-13 CL 60 - 80% Moderate Assist   14 - 19 CK 40 - 60% Moderate Assist   20 - 22 CJ 20 - 40% Minimal Assist   23 CI 1-20% SBA / CGA   24 CH 0% Independent/ Mod I       Patient left supine with all lines intact and call button in reach    ASSESSMENT:  Candy Blakely is a 47 y.o. female with a medical diagnosis of Burst fracture of lumbar vertebra and presents with good progress towards goals.  Pt is appropriate for d/c home with no OP OT needs.  Safety/DME education provided.  Pt verbalized understanding.    Rehab identified problem list/impairments: Rehab identified problem list/impairments: orthopedic precautions, impaired functional mobilty, gait instability, impaired self care skills, impaired balance, weakness, impaired endurance    Discharge recommendations: Discharge Facility/Level Of Care Needs: home     Barriers to discharge: Barriers to Discharge: Decreased caregiver support    Equipment recommendations: none     GOALS:    Occupational Therapy Goals        Problem: Occupational Therapy Goal    Goal Priority Disciplines Outcome Interventions   Occupational Therapy Goal     OT, PT/OT Ongoing (interventions implemented as appropriate)    Description:  Goals to be met by: 10/12     Patient will increase functional independence with ADLs by performing:    Pt/CG demo understanding for spinal precautions with bed mobility.   Supine to sit with Stand-by Assistance.  MET 10/8/2017  -Revised: Supine to sit with supervision MET  Donning LSO while seated EOB with Minimal Assistance. MET  LE Dressing with Minimal Assistance and Assistive Devices as needed.  MET 10/8/2017 (for socks)  Stand pivot to chair with CGA while maintaining R LE NWB status.   Grooming while standing at sink with Minimal Assistance while maintaining R LE " NWB status.  MET 10/8/2017  -Revised:  Grooming while standing at sink with supervision                Problem: Occupational Therapy Goal    Goal Priority Disciplines Outcome Interventions   Occupational Therapy Goal     OT, PT/OT                     Plan:  Patient to be seen 4 x/week to address the above listed problems via self-care/home management, therapeutic activities, therapeutic exercises, neuromuscular re-education  Plan of Care expires: 11/03/17  Plan of Care reviewed with: patient         JOÃO Alicea  10/10/2017

## 2017-10-10 NOTE — PROGRESS NOTES
[facility]  Neurosurgery  Progress Note    Subjective:     History of Present Illness: Candy Blakely is a 47 y.o. female PMH substance abuse who presents s/p approximate 30-foot fall from a tree while intoxicated today. Pt presented with complaints of low back and pelvic pain. Plain imaging revealed fractures of the right inferior pubic ramus, right navicula, and severe burst fracture of L2. CT revealed aforementioned burst fracture with centrally displaced bone fragments and severe cord compression. Neurosurgery was consulted for evaluation.    Post-Op Info:  Procedure(s) (LRB):  DIRECT LATERAL INTERBODY FUSION/Lateral L2 corpectomy with L1-3 fusion (N/A)   6 Days Post-Op     Interval History: Afebrile overnight. Continues to do well with therapy. Unable to obtain sputum cx due to nonproductive cough that has been present since 2 weeks prior to admission. However, cough has improved with chest physio and duonebs.     Medications:  Continuous Infusions:     Scheduled Meds:   bisacodyl  10 mg Rectal Daily    calcium carbonate  500 mg Oral Daily    clonazePAM  2 mg Oral QHS    heparin (porcine)  5,000 Units Subcutaneous Q8H    pantoprazole  40 mg Oral Daily     PRN Meds:acetaminophen, aluminum-magnesium hydroxide-simethicone, dextrose 50%, dextrose 50%, diazePAM, glucagon (human recombinant), glucose, glucose, glucose, HYDROmorphone, labetalol, ondansetron, oxycodone-acetaminophen, promethazine (PHENERGAN) IVPB, senna-docusate 8.6-50 mg     Review of Systems    Objective:     Weight: 54.6 kg (120 lb 4.8 oz)  Body mass index is 22.73 kg/m².  Vital Signs (Most Recent):  Temp: 97.7 °F (36.5 °C) (10/09/17 1044)  Pulse: 103 (10/09/17 1100)  Resp: 18 (10/09/17 1044)  BP: 113/64 (10/09/17 1044)  SpO2: (!) 90 % (10/09/17 1044) Vital Signs (24h Range):  Temp:  [97.7 °F (36.5 °C)-99.8 °F (37.7 °C)] 97.7 °F (36.5 °C)  Pulse:  [] 103  Resp:  [16-18] 18  SpO2:  [90 %-97 %] 90 %  BP:  "(126-124)/(35-07) 711/64                           Urethral Catheter 10/04/17 0910 Non-latex 16 Fr. (Active)   Site Assessment Clean;Intact 10/4/2017 11:57 AM   Collection Container Leg bag 10/4/2017 11:57 AM   Securement Method secured to top of thigh w/ adhesive device 10/4/2017 11:57 AM   Catheter Care Performed yes 10/4/2017  9:10 AM   Reason for Continuing Urinary Catheterization Urinary retention 10/4/2017  9:10 AM   CAUTI Prevention Bundle StatLock in place w 1" slack;Intact seal between catheter & drainage tubing;Drainage bag off the floor;Green sheeting clip in use;No dependent loops or kinks;Drainage bag not overfilled (<2/3 full);Drainage bag below bladder 10/4/2017  9:10 AM   Output (mL) 575 mL 10/4/2017  9:10 AM       Neurosurgery Physical Exam     General: well developed, well nourished, no distress  Head: normocephalic, atraumatic  Neurologic: Alert and oriented. Thought content appropriate  GCS: Motor: 6/Verbal: 5/Eyes: 4 GCS Total: 15  Mental Status: Awake, Alert, Oriented x 4  Language: No aphasia  Speech: No dysarthria  Cranial nerves: face symmetric, tongue midline, CN II-XII grossly intact.   Eyes: pupils equal, round, reactive to light with accommodation, EOMI  Pulmonary: normal respirations, not labored, no accessory muscles used  Abdomen: soft, non-distended, not tender to palpation  Sensory: intact to light touch throughout  Motor Strength: Moves all extremities spontaneously with good tone.   No abnormal movements seen.     Strength  Deltoids Triceps Biceps Wrist Extension Wrist Flexion Hand    Upper: R 5/5 5/5 5/5 5/5 5/5 5/5    L 5/5 5/5 5/5 5/5 5/5 5/5     Iliopsoas Quadriceps Knee  Flexion Tibialis  anterior Gastro- cnemius EHL   Lower: R 4/5 4+/5 5/5 N/A N/A N/A    L 5/5 5/5 5/5 5/5 5/5 5/5     Unable to fully examine RLE due to large splint to knee, can wiggle toes & at least 3/5 HF  Pronator Drift: no drift noted  Finger-to-nose: Intact bilaterally  Lord: absent  Clonus: " absent on left, unable to assess on right due to R splint  Babinski: absent on left, unable to assess on right due to R splint  Pulses: 2+ and symmetric radial and dorsalis pedis  Skin: warm, dry and intact, no rashes    Significant Labs:    Recent Labs  Lab 10/08/17  0527 10/09/17  0515   GLU 97 98    137   K 4.2 3.6    102   CO2 29 26   BUN 9 7   CREATININE 0.7 0.6   CALCIUM 8.9 8.6*       Recent Labs  Lab 10/08/17  0527 10/09/17  0515   WBC 6.40 5.62   HGB 11.7* 11.1*   HCT 35.1* 33.5*    303     No results for input(s): LABPT, INR, APTT in the last 48 hours.  Microbiology Results (last 7 days)     Procedure Component Value Units Date/Time    Blood culture [[id number]] Collected:  10/09/17 1046    Order Status:  Sent Specimen:  Blood Updated:  10/09/17 1052    Blood culture [[id number]] Collected:  10/09/17 1047    Order Status:  Sent Specimen:  Blood Updated:  10/09/17 1052    Blood culture [[id number]] Collected:  10/06/17 2245    Order Status:  Completed Specimen:  Blood from Line, Jugular, Internal Right Updated:  10/09/17 0612     Blood Culture, Routine No Growth to date     Blood Culture, Routine No Growth to date     Blood Culture, Routine No Growth to date    Blood culture [[id number]] Collected:  10/06/17 2243    Order Status:  Completed Specimen:  Blood Updated:  10/09/17 0612     Blood Culture, Routine No Growth to date     Blood Culture, Routine No Growth to date     Blood Culture, Routine No Growth to date    Narrative:       Aerobic and anaerobic, draw from peripheral and ij cvc please  Collection has been rescheduled by CAB2 at 10/6/2017 22:05   Collection has been rescheduled by CAB2 at 10/6/2017 22:06 Reason:   Nurse Draw  Collection has been rescheduled by CAB2 at 10/6/2017 22:05   Collection has been rescheduled by CAB2 at 10/6/2017 22:06 Reason:   Nurse Draw    Blood culture [[id number]] Collected:  10/07/17 0018    Order Status:  Completed Specimen:  Blood Updated:  10/09/17  0452     Blood Culture, Routine Gram stain aer bottle: Gram positive rods      Blood Culture, Routine Results called to and read back by:Xena Sherwood RN 10/09/2017  04:51    Narrative:       Aerobic and anaerobic, draw from peripheral and ij cvc please  Collection has been rescheduled by CAB2 at 10/6/2017 22:05   Collection has been rescheduled by CAB2 at 10/6/2017 22:06 Reason:   Nurse Draw  Collection has been rescheduled by CDP at 10/7/2017 00:16   Collection has been rescheduled by CAB2 at 10/6/2017 22:05   Collection has been rescheduled by CAB2 at 10/6/2017 22:06 Reason:   Nurse Draw  Collection has been rescheduled by CDP at 10/7/2017 00:16     Blood culture [[id number]] Collected:  10/06/17 1543    Order Status:  Completed Specimen:  Blood Updated:  10/08/17 2012     Blood Culture, Routine No Growth to date     Blood Culture, Routine No Growth to date     Blood Culture, Routine No Growth to date    Culture, Respiratory with Gram Stain [[id number]]     Order Status:  No result Specimen:  Respiratory     Urine culture [[id number]] Collected:  10/06/17 2244    Order Status:  Completed Specimen:  Urine from Urine, Clean Catch Updated:  10/08/17 0000     Urine Culture, Routine No growth    Urine culture [[id number]] Collected:  10/06/17 2248    Order Status:  Sent Specimen:  Urine from Urine, Clean Catch Updated:  10/06/17 2248    Blood culture [[id number]]     Order Status:  Canceled Specimen:  Blood           Significant Diagnostics:  I personally reviewed MRI Lumbar spine & agree with findings: Acute compression/burst fracture of the L2 vertebral body with extension into the left pedicle as detailed above. Retropulsion of fracture fragments with mass effect upon the thecal sac resulting in severe spinal canal stenosis at this level. Small amount of anterior epidural hemorrhage.    Nondisplaced fracture involving the L1 vertebral body, without significant height loss or retropulsion of fracture fragments.      Assessment/Plan:     * Burst fracture of lumbar vertebra    47 y.o. female s/p 30-ft fall from tree with L2 burst fracture with central retropulsion, now s/p lateral L2 corpectomy and L1-3 posterior fusion.    - ID: Afebrile overnight. No leukocytosis. Bcx 10/6 with GPRs. CXR with continued/slightly increased L pleural effusion. Sputum culture not collected due to nonproductive cough. Repeat bcx 10/9 are NGTD.   - Will obtain BLE US today to r/o DVT.  - IS Q hour. Duonebs, chest physio.  - LSO brace when standing or OOB.  - Urinary retention: Resolved.  - Ortho: WBAT. Navicular fracture splinted, no surgical intervention warranted. Will provide referral for follow up at [facility] upon discharge.  - General surgery: no evidence for abd injury on CT & exam without concern for abd injury  - Will DC home with outpatient therapy today if BLE US negative for DVT.  - Instructions were given verbally and written to patient and family. They voiced understanding. They were instructed to contact the clinic with any questions they might have prior to his follow up appointments.            Discussed with Dr. Gasca.    Nancy Manning PA-C  Neurosurgery  [facility]

## 2017-10-10 NOTE — PLAN OF CARE
"Problem: Patient Care Overview  Goal: Plan of Care Review  Outcome: Ongoing (interventions implemented as appropriate)  POC reviewed with patient and friend at bedside, understanding verbalized. Patient would like team to "double" Percocet dose, says it is "too low to do anything". Dilaudid given and effective. Brace in place, re-educated on proper body mechanics, demonstrated understanding by teach back/demonstration. Respiratory treatments continued, patient not having productive cough to unable to obtain specimen. VSS, afebrile throughout shift. All safety precautions maintained, will continue to monitor.       "

## 2017-10-10 NOTE — DISCHARGE INSTRUCTIONS
Please follow ONLY the instructions that are checked below.    Activity Restrictions:  [x]  Return to work will be determined on an individual basis.  [x]  No lifting greater than 10 pounds.  [x]  Avoid bending and twisting the area of your surgery more than 45 degrees from neutral position in any direction.  [x]  No driving or operating machinery:  [x]  until cleared by your surgeon.  [x]  while taking narcotic pain medications or muscle relaxants.  []  No cervical collar, soft collar, or lumbar brace required.  []  Wear your brace at all times. You may be given an extra brace or soft collar to wear when showering.  [x]  Wear your brace at all times except when flat in bed.  []  Wear brace for comfort only.  [x]  Increase ambulation over the next 2 weeks so that you are walking 2 miles per day at 2 weeks post-operatively.  [x]  Walk on paved surfaces only. It is okay to walk up and down stairs while holding onto a side rail.  [x]  No sexual activity for 2-3 weeks.    Discharge Medication/Follow-up:  [x]  Please refer to discharge medication reconciliation form.  [x]  Do not take ANY non-steroidal anti-inflammatory drugs (NSAIDS), including the following: ibuprofen, naprosyn, Aleve, Advil, Indocin, Mobic, or Celebrex for:  []  4 weeks  [x]  8 weeks  []  6 months  [x]  Prescriptions for appropriate medication will be given upon discharge.   [x]  Pain control:             [x]  Muscle relaxer:            [x]  Take docusate (Colace 100 mg): take one capsule a day as needed for constipation. You can get this over the counter.  [x]  Follow-up appointment:  [x]  10-14 days post-op for wound check by physician assistant/nurse  [x]  4-6 weeks with MD:  [x]  with x-rays  []  without x-rays  [x]  An appointment will be mailed to you.    Wound Care:  []  Remove dressing or bandaid in    days.  [x]  No bandage required. Keep your incision open to the air.  [x]  You may shower on the 2nd day after your surgery. Have the force of  water hit you opposite from the incision. Pat the incision dry after your shower; do not scrub the incision.  [x]  You cannot take a bath until 8 weeks after surgery.  [x]  Apply bacitracin to incision twice a day for 7   more days.    Call your doctor or go to the Emergency Room for any signs of infection, including: increased redness, drainage, pain, or fever (temperature ?101.5 for 24 hours). Call your doctor or go to the Emergency Room if there are any localized neurological changes; problems with speech, vision, numbness, tingling, weakness, or severe headache; or for other concerns.    Special Instructions:  [x]  No use of tobacco products.  [x]  Diet: Please eat a regular diet as tolerated.  []  Other diet:              Specific physician instructions:           Physicians need 3 days' notice for pain medicine to be refilled. Pain medicine will only be refilled between 8 AM and 5 PM, Monday through Friday, due to Food and Drug Administration regulation of documentation.    If you have any questions about this form, please call [phone].    Form No. 81230 (Revised 10/31/2013)

## 2017-10-10 NOTE — PLAN OF CARE
Problem: Occupational Therapy Goal  Goal: Occupational Therapy Goal  Goals to be met by: 10/12     Patient will increase functional independence with ADLs by performing:    Pt/CG demo understanding for spinal precautions with bed mobility.   Supine to sit with Stand-by Assistance.  MET 10/8/2017  -Revised: Supine to sit with supervision MET  [place] LSO while seated EOB with Minimal Assistance. MET  LE Dressing with Minimal Assistance and Assistive Devices as needed.  MET 10/8/2017 (for socks)  Stand pivot to chair with CGA while maintaining R LE NWB status.   Grooming while standing at sink with Minimal Assistance while maintaining R LE NWB status.  MET 10/8/2017  -Revised:  Grooming while standing at sink with supervision       Outcome: Ongoing (interventions implemented as appropriate)  OT goals remain appropriate.  Pt progressing.  JOÃO Alicea  10/10/2017

## 2017-10-10 NOTE — PLAN OF CARE
Patient to be discharged home. The patient will have Outpatient Therapy. Family to provide transportation.    Neurosurgery clinic to schedule follow up appointment.     10/10/17 1437   Final Note   Assessment Type Final Discharge Note   Discharge Disposition Home   Hospital Follow Up  Appt(s) scheduled? (Neurosurgery clinic to schedule follow up appointment.)   Discharge plans and expectations educations in teach back method with documentation complete? Yes

## 2017-10-10 NOTE — PLAN OF CARE
Problem: Patient Care Overview  Goal: Plan of Care Review  POC reviewed with pt; verbalized understanding. AAOx4. VSS. No acute changes. Safety precautions maintained. Pt to be D/C'd home with a friend. D/C instructions given; verbalized understanding. Pt left unit with transport in wheelchair.

## 2017-10-10 NOTE — PT/OT/SLP PROGRESS
Physical Therapy  Treatment    Candy Blakely   MRN: [id number]   Admitting Diagnosis: Burst fracture of lumbar vertebra    PT Received On: 10/10/17  PT Start Time: 0810     PT Stop Time: 0842    PT Total Time (min): 32 min       Billable Minutes:  Gait [id number] and Therapeutic Activity 16    Treatment Type: Treatment  PT/PTA: PT     PTA Visit Number: 0       General Precautions: Standard, fall  Orthopedic Precautions: RLE non weight bearing   Braces: LSO         Subjective:  Communicated with RN prior to session.  Pt agreeable to therapy session.     Pain/Comfort  Pain Rating 1: other (see comments) (pt did not rate pain)  Location - Side 1: Bilateral  Location 1: back  Pain Addressed 1: Distraction, Cessation of Activity, Reposition  Pain Rating Post-Intervention 1: other (see comments) (pt did not rate pain)    Objective:   Patient found with: telemetry (LSO)    Functional Mobility:  Bed Mobility:   Supine to Sit: Supervision    Transfers:  Sit <> Stand Assistance: Stand By Assistance, Supervision (vc's for handplacement; from EOB and bedside chair)  Sit <> Stand Assistive Device: Rolling Walker    Gait:   Gait Distance: 4 steps to chair with seated rest break then ~40ft, vc's for AD management; pt able to maintain R LE NWB   Assistance 1: Stand by Assistance  Gait Assistive Device: Rolling walker  Gait Pattern: 3-point gait    Stairs:  Pt ascended/descend 1 stair(s) with R HHA with left handrails with Minimal Assistance; pt unable to maintain R LE NWB.     Balance:   Static Sit: GOOD: Takes MODERATE challenges from all directions  Dynamic Sit: GOOD: Maintains balance through MODERATE excursions of active trunk movement  Static Stand: GOOD-: Takes MODERATE challenges from all directions inconsistently  Dynamic stand: FAIR: Needs CONTACT GUARD during gait     Therapeutic Activities and Exercises:  Pt educated on safety with transfers, amb and stair negotiation.    Pt safe to amb to bathroom with RN staff with  RW; RN reports ordering RW.     AM-PAC 6 CLICK MOBILITY  How much help from another person does this patient currently need?   1 = Unable, Total/Dependent Assistance  2 = A lot, Maximum/Moderate Assistance  3 = A little, Minimum/Contact Guard/Supervision  4 = None, Modified [place]/Independent    Turning over in bed (including adjusting bedclothes, sheets and blankets)?: 4  Sitting down on and standing up from a chair with arms (e.g., wheelchair, bedside commode, etc.): 3  Moving from lying on back to sitting on the side of the bed?: 3  Moving to and from a bed to a chair (including a wheelchair)?: 3  Need to walk in hospital room?: 3  Climbing 3-5 steps with a railing?: 2  Total Score: 18    AM-PAC Raw Score CMS G-Code Modifier Level of Impairment Assistance   6 % Total / Unable   7 - 9 CM 80 - 100% Maximal Assist   10 - 14 CL 60 - 80% Moderate Assist   15 - 19 CK 40 - 60% Moderate Assist   20 - 22 CJ 20 - 40% Minimal Assist   23 CI 1-20% SBA / CGA   24 CH 0% Independent/ Mod I     Patient left up in chair with all lines intact, call button in reach and RN notified.    Assessment:  Candy Blakely is a 47 y.o. female with a medical diagnosis of Burst fracture of lumbar vertebra and presents with decreased strength, endurance, balance and overall functional mobility. Pt performed bed mobility S and transfers SBA/S. Pt amb ~40ft SBA with RW, vc's for AD management; pt able to maintain R LE NWB. Pt ascended/descend 1 step with R HHA with L handrails with Minimal Assistance; pt unable to maintain R LE NWB. Pt will continue to benefit from skilled PT to improve deficits and increase overall functional mobility.     Rehab identified problem list/impairments: Rehab identified problem list/impairments: weakness, impaired functional mobilty, decreased safety awareness, gait instability, impaired endurance, impaired balance, pain, decreased lower extremity function, orthopedic precautions    Rehab potential is  good.    Activity tolerance: Good    Discharge recommendations: Discharge Facility/Level Of Care Needs: outpatient PT (family assist )     Barriers to discharge: Barriers to Discharge: Inaccessible home environment, Decreased caregiver support    Equipment recommendations: Equipment Needed After Discharge: bedside commode, walker, rolling, wheelchair     GOALS:    Physical Therapy Goals        Problem: Physical Therapy Goal    Goal Priority Disciplines Outcome Goal Variances Interventions   Physical Therapy Goal     PT/OT, PT Ongoing (interventions implemented as appropriate)     Description:  Goals to be met by: 10/15/2017     Patient will increase functional independence with mobility by performin. Supine to sit with Mod (I)  2. Sit to supine with Stand-by Assistance  3. Sit to stand transfer with Contact Guard Assistance-met   Revised: sit to stand transfer with mod (I)  4. Bed to chair transfer with SBA (CGA met 10/6)  5. Gait x40 feet with Minimal Assistance using Rolling Walker-met   Revised: Gait x 80ft with Supervision using RW   6. Lower extremity exercise program x15 reps per handout, with independence  7. Propel w/c with B UE for 200ft Supervision  8. Ascend/descend 4 steps with B HR SBA with or without appropriate AD.                          PLAN:    Patient to be seen 4 x/week  to address the above listed problems via gait training, therapeutic exercises, neuromuscular re-education, therapeutic activities  Plan of Care expires: 17  Plan of Care reviewed with: patient         TRACE DAWN, PT  10/10/2017

## 2017-10-11 LAB — BACTERIA BLD CULT: NORMAL

## 2017-10-12 ENCOUNTER — TELEPHONE (OUTPATIENT)
Dept: NEUROSURGERY | Facility: CLINIC | Age: 47
End: 2017-10-12

## 2017-10-12 ENCOUNTER — NURSE TRIAGE (OUTPATIENT)
Dept: ADMINISTRATIVE | Facility: CLINIC | Age: 47
End: 2017-10-12

## 2017-10-12 DIAGNOSIS — Z98.1 S/P LUMBAR SPINAL FUSION: Primary | ICD-10-CM

## 2017-10-12 LAB
BACTERIA BLD CULT: NORMAL

## 2017-10-12 NOTE — TELEPHONE ENCOUNTER
Additional Information   Negative: Wound doesn't sound infected, or only mild redness     Patient regarding dressing change due and technique. Incision has no s/s infection and dressing c/d/i.    Protocols used: ST WOUND INFECTION-A-[place]

## 2017-10-12 NOTE — TELEPHONE ENCOUNTER
patient was wondering what to do with the drain wounds . I advised to leave steri strips let them fall off on their own. Still do not get wet. Made 2 week f/u appt for 10/16/2017 at 1000. Notified . Will have xrays and 6 week post op scheduled for when they come monday

## 2017-10-12 NOTE — TELEPHONE ENCOUNTER
----- Message from Collette A Carson, RN sent at 10/12/2017  4:31 PM CDT -----  Please contact patient regarding dressing change due and technique. Incision has no s/s infection and dressing c/d/i.   Thank you.

## 2017-10-13 NOTE — PT/OT/SLP DISCHARGE
Physical Therapy Discharge Summary    Candy Blakely  MRN: [id number]   Burst fracture of lumbar vertebra   Patient Discharged from acute Physical Therapy on 10/10/2017.  Please refer to prior PT noted date on 10/10/2017 for functional status.     Assessment:   Patient appropriate for care in another setting.  GOALS:    Physical Therapy Goals     Not on file          Multidisciplinary Problems (Resolved)        Problem: Physical Therapy Goal    Goal Priority Disciplines Outcome Goal Variances Interventions   Physical Therapy Goal   (Resolved)     PT/OT, PT Outcome(s) achieved     Description:  Goals to be met by: 10/15/2017     Patient will increase functional independence with mobility by performin. Supine to sit with Mod (I)  2. Sit to supine with Stand-by Assistance  3. Sit to stand transfer with Contact Guard Assistance-met   Revised: sit to stand transfer with mod (I)  4. Bed to chair transfer with SBA (CGA met 10/6)  5. Gait x40 feet with Minimal Assistance using Rolling Walker-met   Revised: Gait x 80ft with Supervision using RW   6. Lower extremity exercise program x15 reps per handout, with independence  7. Propel w/c with B UE for 200ft Supervision  8. Ascend/descend 4 steps with B HR SBA with or without appropriate AD.                        Reasons for Discontinuation of Therapy Services  Transfer to alternate level of care.      Plan:  Patient Discharged to: Outpatient Therapy Services.    TRACE SEYMOUR, PT  10/13/2017

## 2017-10-13 NOTE — DISCHARGE SUMMARY
[facility]  Neurosurgery  Discharge Summary      Patient Name: Candy Blakely  MRN: [id number]  Admission Date: 10/3/2017  Hospital Length of Stay: 7 days  Discharge Date and Time: 10/10/2017  3:39 PM  Attending Physician: No att. providers found   Discharging Provider: Nancy Manning PA-C  Primary Care Provider: Primary Doctor Verna    HPI:   Candy Blakely is a 47 y.o. female PMH substance abuse who presents s/p approximate 30-foot fall from a tree while intoxicated today. Pt presented with complaints of low back and pelvic pain. Plain imaging revealed fractures of the right inferior pubic ramus, right navicula, and severe burst fracture of L2. CT revealed aforementioned burst fracture with centrally displaced bone fragments and severe cord compression. Neurosurgery was consulted for evaluation.    Procedure(s) (LRB):  DIRECT LATERAL INTERBODY FUSION/Lateral L2 corpectomy with L1-3 fusion (N/A)     Hospital Course: 10/3: OR for L2 corpectomy & fusion.  10/5: Required straight cath x 1 for retention. PT/OT recommend rehab. Left and right drain outputs are 80 and 55 overnight, respectively.  10/6: Voiding s/p marsh removal. Hypotensive, tachycardic overnight. Afebrile. 1/2L bolus given. Bcx, UA, Ucx, CXR ordered. Will remove drains today.  10/8: Febrile over the weekend. Broad spectrum abx started, then stopped as workup was negative. However, bcx from 10/6 with GPRs today. Will check 2 bcx to rule out contaminant.  10/9: Afebrile overnight. Has progressed to outpatient therapy when appropriate.  10/10: Afebrile overnight. Continues to do well with therapy. Unable to obtain sputum cx due to nonproductive cough. However, cough has improved with chest physio and duonebs.     Consults:   Consults         Status Ordering Provider     Inpatient consult to General Surgery  Once     Provider:  (Not yet assigned)    ROMEO Diggs     Inpatient consult to [facility] Medicine-General  Once      Provider:  (Not yet assigned)    Completed YANET GILES     Inpatient consult to Neurosurgery  Once     Provider:  (Not yet assigned)    Completed ROMEO WASHINGTON     Inpatient consult to Orthopedic Surgery  Once     Provider:  (Not yet assigned)    Completed ROMEO WASHINGTON     Inpatient consult to Physical Medicine Rehab  Once     Provider:  (Not yet assigned)    Completed RAFAEL MONGE          Significant Diagnostic Studies: CBC, BMP, PT, INR, type and screen, xray lumbar spine, CT lumbar spine, MRI lumbar spine, xray pelvis and hip, xray R foot    Pending Diagnostic Studies:     None        Final Active Diagnoses:    Diagnosis Date Noted POA    PRINCIPAL PROBLEM:  Burst fracture of lumbar vertebra [S32.001A] 10/03/2017 Yes    Trauma [T14.90XA] 10/05/2017 Yes    Closed displaced fracture of navicular bone of right foot [S92.251A] 10/04/2017 Yes    Closed navicular fracture of right foot [S92.251A] 10/03/2017 Yes    Pre-op evaluation [Z01.818] 10/03/2017 Not Applicable    Fracture of right calcaneus [S92.001A] 10/03/2017 Yes      Problems Resolved During this Admission:    Diagnosis Date Noted Date Resolved POA      Discharged Condition: stable    Disposition: Home or Self Care    Follow Up: See the patient instruction tab for detailed discharge instructions and follow up information.    Patient Instructions:     Ambulatory Referral to Orthopedics   Referral Priority: Routine Referral Type: Consultation   Referral Reason: Specialty Services Required    Requested Specialty: Orthopedic Surgery    Number of Visits Requested: 1      Ambulatory Referral to Physical/Occupational Therapy   Referral Priority: Routine Referral Type: Physical Medicine   Referral Reason: Specialty Services Required    Requested Specialty: Physical Therapy    Number of Visits Requested: 1        Medications:  Reconciled Home Medications:   Discharge Medication List as of 10/10/2017  1:59 PM      START taking these  medications    Details   diazePAM (VALIUM) 5 MG tablet Take 1 tablet (5 mg total) by mouth every 6 (six) hours as needed (muscle spasm)., Starting Tue 10/10/2017, Until Thu 11/9/2017, Print      oxycodone-acetaminophen (PERCOCET) 5-325 mg per tablet Take 1 tablet by mouth every 4 (four) hours as needed., Starting Tue 10/10/2017, Print         CONTINUE these medications which have NOT CHANGED    Details   clonazePAM (KLONOPIN) 1 MG tablet Take 2 mg by mouth every evening., Until Discontinued, Historical Med      dextroamphetamine-amphetamine (AMPHETAMINE SALT COMBO) 30 mg Tab Take by mouth once daily., Historical Med             Nancy Manning PA-C  Neurosurgery  [facility]

## 2017-10-14 LAB
BACTERIA BLD CULT: NORMAL
BACTERIA BLD CULT: NORMAL

## 2017-10-16 ENCOUNTER — CLINICAL SUPPORT (OUTPATIENT)
Dept: NEUROSURGERY | Facility: CLINIC | Age: 47
End: 2017-10-16
Payer: MEDICAID

## 2017-10-16 VITALS
DIASTOLIC BLOOD PRESSURE: 67 MMHG | SYSTOLIC BLOOD PRESSURE: 118 MMHG | TEMPERATURE: 98 F | BODY MASS INDEX: 22.67 KG/M2 | HEART RATE: 107 BPM | WEIGHT: 120 LBS

## 2017-10-16 PROCEDURE — 99999 PR PBB SHADOW E&M-EST. PATIENT-LVL III: CPT | Mod: PBBFAC,,,

## 2017-10-16 PROCEDURE — 99213 OFFICE O/P EST LOW 20 MIN: CPT | Mod: PBBFAC

## 2017-10-16 RX ORDER — OXYCODONE AND ACETAMINOPHEN 7.5; 325 MG/1; MG/1
1 TABLET ORAL EVERY 4 HOURS PRN
Qty: 60 TABLET | Refills: 0 | Status: SHIPPED | OUTPATIENT
Start: 2017-10-16 | End: 2017-11-07

## 2017-10-16 NOTE — PROGRESS NOTES
Patient seen in clinic for 2 week post-op L1-3 fusion with Dr. Gasca 10/04/2017 accompanied by boyfriend; In wheelchair ; brace intact. States pain is a 10/10 at worst and 6/10 at best in the last week. Denies numbness, tingling. Left lateral Incision assessed ;staples removed  Advised to stop bacitracin. No swelling or purulent drainage; edges well approximated; healing well. Post-op restrictions reviewed and enforced with an emphasis on body mechanics and keeping incision dry. 6 week appt reviewed. PHB completed. Refilled percocet 7.5/325 #60. Verbalized understanding and agreed to comply with all instructions. Encouraged to call the clinic for any further questions or concerns.

## 2017-10-18 ENCOUNTER — TELEPHONE (OUTPATIENT)
Dept: ORTHOPEDICS | Facility: CLINIC | Age: 47
End: 2017-10-18

## 2017-10-18 NOTE — TELEPHONE ENCOUNTER
----- Message from Semaj Nolasco MD sent at 10/11/2017 10:51 AM CDT -----  Patient will be following up with [facility]. Please make sure he was given appropriate contact info for [facility]. Dr. Heath has a consult note with diagnosis.

## 2017-10-18 NOTE — TELEPHONE ENCOUNTER
Spoke with Socrates, pt's friend.  Provided him with the phone for [facility] ortho clinic for follow up of foot.  Socrates verbalized understanding

## 2017-10-20 ENCOUNTER — CLINICAL SUPPORT (OUTPATIENT)
Dept: REHABILITATION | Facility: HOSPITAL | Age: 47
End: 2017-10-20
Attending: PHYSICIAN ASSISTANT
Payer: MEDICAID

## 2017-10-20 DIAGNOSIS — R53.1 WEAKNESS: Primary | ICD-10-CM

## 2017-10-20 DIAGNOSIS — R26.2 DIFFICULTY WALKING: ICD-10-CM

## 2017-10-20 PROCEDURE — 97162 PT EVAL MOD COMPLEX 30 MIN: CPT

## 2017-10-20 PROCEDURE — 97110 THERAPEUTIC EXERCISES: CPT

## 2017-10-20 PROCEDURE — G8978 MOBILITY CURRENT STATUS: HCPCS | Mod: CM

## 2017-10-20 PROCEDURE — G8979 MOBILITY GOAL STATUS: HCPCS | Mod: CK

## 2017-10-20 NOTE — PLAN OF CARE
Physical Therapy Evaluation    Name: Candy Blakely  [facility] Number: [id number]    Diagnosis:   Encounter Diagnoses   Name Primary?    Weakness Yes    Difficulty walking      Physician: Nancy Manning PA-C  Treatment Orders: PT Eval and Treat    History     Past Medical History:   Diagnosis Date    ADHD (attention deficit hyperactivity disorder)     Anxiety      Current Outpatient Prescriptions   Medication Sig    clonazePAM (KLONOPIN) 1 MG tablet Take 2 mg by mouth every evening.    dextroamphetamine-amphetamine (AMPHETAMINE SALT COMBO) 30 mg Tab Take by mouth once daily.    diazePAM (VALIUM) 5 MG tablet Take 1 tablet (5 mg total) by mouth every 6 (six) hours as needed (muscle spasm).    oxycodone-acetaminophen (PERCOCET) 5-325 mg per tablet Take 1 tablet by mouth every 4 (four) hours as needed.    oxycodone-acetaminophen (PERCOCET) 7.5-325 mg per tablet Take 1 tablet by mouth every 4 (four) hours as needed for Pain.     No current facility-administered medications for this visit.      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 female that presents to [facility] secondary to s/p L1-3 fusion. Injury/surgery occurred approximately: 10/3/17; sx: 10/4/17. Pt. presents with the following co-morbidities and personal factors that directly impact her  plan of care: ADHD.  X-ray was taken and revealed Postoperative changes of L1/L3 spinal fusion procedure.  No unusual postoperative findings noted. Pt denies numbness and tingling in bilateral LEs (can feel front 1/2 of foot not sure of posterior calcaneus due to cast).     Red flags:  Pt. denies bowel/bladder symptoms (urinary retention/fecal incontinence). Recent weight loss? No; Constant/Night pain that is unchanging with change of position? No; PMH of CA? No    Onset/TARA: sudden: pt. was climbing a tree on 10/3/17 when she slipped on the parra of the tree; fell out of tree landing directly on her back. Pt. went to the ER:     Previous treatment: sx    Pain Scale: Candy rates pain on a scale of 0-10 to be 2 currently with medication; 2 at best; 8 at worst .    Aggravating factors: extreme difficulty: stair negotiation, using broom, lifting a box of groceries from floor, grooming/dressing, donning shoes/socks, driving 1hr, and unable to perform household tasks  Relieving factors: rest, medication    ADLs: Pt has a decreased ability to perform ADLs such as see above.    Patient Goals: Pt would like to decrease pain and increase function so she can return to her normal daily activities: work/household tasks    Objective     Observation: ectomorph, wearing LSO and right foot cast; pt. entered clinic with RW; incision site: mild puckering of incision closed no drainage    Posture: subcranial hyperextension, cervical flattened lordosis, FHP, mild Tspine kyphosis, rolled anterior/IR shoulders, shoulder protraction    Lumbar ROM: (measured in degrees)    Degrees Quality   Flexion   NT    Extension NT      Left Side Bending NT    Right Side Bending NT    Left rotation NT    Right Rotation NT      Dermatomes: (impaired/normal)     RLE LLE   L2 Intact Intact   L3 Intact Intact   L4 Intact Intact   L5 Intact Intact   S1 Intact Intact     Reflexes: L3: 2 bilaterally; S1: left 2    Lower Extremity Strength (graded 0-5 out  of 5)   RLE LLE   Hip flexion: 4+/5 4+/5   Hip ER 4+/5 4+/5   Hip IR 4+/5 4+/5   Knee extension: 4+/5 4+/5   Ankle dorsiflexion: NT 5/5   Great toe extension: NT 4+/5   Posterior fibers of Gluteus medius 3-/5 4+/5   Knee flexion 4/5 4+/5   Ankle plantarflexion NT 4+/5   Hip extension: 3/5 3/5     Special Tests: ((+): pos.; (-): neg.)   · SLR Test: bilateral: 80 deg.   · Pirformis Test: -    Flexibility:   · Popliteal Angle: R = 65 degrees ; L = 55 degrees    Palpation for condition:   · Position: right knee in slight flexion due to cast  · Warmth: normal  · Swelling: none  · Texture: hypertonic bilateral hamstrings    Joint Mobility: (graded 0-6 out of 6) NT    Functional Status Measures:    Intake Score     Pts Physical FS Primary Measure      19                          Risk Adjustment Statistical FOTO     43        PT reviewed FOTO scores for Candy Blakely on 10/20/2017.   FOTO scores were entered into EPIC - see media section.    History  Co-morbidities and personal factors that may impact the plan of care Examination  Body Structures and Functions, activity limitations and participation restrictions that may impact the plan of care Clinical Presentation   Decision Making/ Complexity Score   Co-morbidities:   ADHD/anxiety            Personal Factors:   PMH of substance abuse Body Regions: lumbar and SIJ    Body Systems: Decreased AROM; pelvic dysfunction; core hip lumbopelvic weakness; poor posture; pain with transitional activities, decrease exercise ability, and pain with ADLs.     Activity limitations: prolonged sitting/standing, community ambulation, household/work related activities, lifting, bending/stooping, twisting, self care skills, and transitional activities      Participation Restrictions: activities outside of home; mobility with LSO   evolving with changing clinical characteristics   moderate     Clinical Presentation/complexity category  Moderate complexity category: pt. has 1-2 personal  factors and/or co-morbidities directly  impacting POC, 3 or more body system impairments/functional limitations/participation restrictions; as well as, condition is evolving with changing clinical characteristics.    PT Evaluation Completed? Yes  Discussed Plan of Care with patient: Yes    TREATMENT:  Therapeutic exercise: Candy received therapeutic exercises to develop strength and endurance, flexibility for 10 minutes including: [id number]: diaphragmatic breathing, log roll bed mobility, and posture correction; brace usage, and review of proper body mechanics    Pt. Education: Instructed pt. regarding:body mechanics, posture, activity modification/avoidance, and proper technique with all exercises. Pt. to demonstrate good understanding of the education provided. Candy demonstrated good return demonstration of activities. No cultural, environmental, or spiritual barriers identified to treatment or learning.    Medical necessity is demonstrated by the following IMPAIRMENTS/PROBLEM LIST:   1) Pain limiting function   2) Posture dysfunction   3) Core/Lumbar/LE weakness   4) Decreased thoracic/lumbar joint mobility   5) Decreased Lumbar ROM   6) Decreased soft tissue extensibility/fascia restriction   7) Decreased LE flexibility: bilateral hamstrings/sciatic nerve   8) Lack of HEP   9) Abnormal gait    GOALS:   Short Term Goals:  4 weeks  1. Report decreased lumbar pain </= 5/10 at worst to increase tolerance for prolonged sitting/standing  2. Pt. to demonstrate proper cervical and scapula retraction requiring min. to no verbal cues from PT  3. Pt. to demonstrate increased MMT for core/lumbar paraspinals to 3/5 to increase endurance with prolonged sitting/standing.  4. Pt. to be independent with symptom management requiring minimal verbal cues from PT  5. Pt to tolerate HEP to improve ROM and independence with ADL's    Long Term Goals: 8 weeks  1. Report decreased lumbar pain </=  2/10 at worst to increase  tolerance for prolonged sitting/standing/community ambulation  2. Pt. to demonstrate proper cervical and scapula retraction requiring no verbal cues from PT  3. Pt. to demonstrate increased MMT for core/lumbar paraspinals to 3+/5 to increase endurance with prolonged sitting.   4. Pt. to demonstrate increased MMT for right bilateral gluteus medius to 4+/5 to increase stability during ambulation on uneven surfaces.  5. Pt. to demonstrate increased MMT for bilateral hamstrings to 4+/5 to increase tolerance for ADL and work activities.   6. Pt to be independent with HEP to improve ROM and independence with ADL's  7. Pt. to ambulate community distances with LRD    Assessment   This is a 47 y.o. female referred to outpatient physical therapy who presents with a medical diagnosis of s/p burst fx of lumbar spine and PT diagnosis of weakness and difficulty walking demonstrating joint dysfunction and functional limitation as described above. Level of complexity is moderate;  based on patient's past medical history including the above co-morbidities and personal factors; functional limitations, and clinical presentation directly impacting his/her plan of care.     Patient was in agreement with set goals and plan of care. Pt was given a HEP consisting of posture reeducation, diaphragmatic breathing, instruction on body mechanics, activity modification/avoidance, and core/lumbar/LE strengthening regimen. Pt. verbally understood instructions and demonstrated proper form/technique. Pt was advised to perform these exercises free of pain, and discontinue use if symptoms persist/worsen. Pt will benefit from physical therapy services in order to maximize pain free functional independence.     Plan     Pt will be treated by physical therapy 1-3 times a week for 8 weeks for pt. education, HEP, aquatic therapy if land based regimen is not tolerable,therapeutic exercises, neuromuscular re-education, soft tissue and joint mobilizations;  and modalities prn to achieve established goals. Candy may at times be seen by a PTA as part of the Rehab Team.     I certify the need for these services furnished under this plan of treatment and while under my care.______________________________ Physician/Referring Practitioner  Date of Signature

## 2017-10-31 RX ORDER — OXYCODONE AND ACETAMINOPHEN 5; 325 MG/1; MG/1
1 TABLET ORAL EVERY 4 HOURS PRN
Qty: 60 TABLET | Refills: 0 | Status: SHIPPED | OUTPATIENT
Start: 2017-10-31 | End: 2017-11-14 | Stop reason: SDUPTHER

## 2017-10-31 NOTE — TELEPHONE ENCOUNTER
----- Message from Nam Zhou sent at 10/31/2017 10:44 AM CDT -----  Contact: Patient @ [phone]  Patient needs a refill on (oxycodone-acetaminophen (PERCOCET) 7.5-325 mg per tablet )     [facility]/pharmacy #[id number] - [place] - [address]  [address]  [place] [address]  Phone: [phone] Fax: [phone]

## 2017-11-01 ENCOUNTER — CLINICAL SUPPORT (OUTPATIENT)
Dept: REHABILITATION | Facility: HOSPITAL | Age: 47
End: 2017-11-01
Attending: PHYSICIAN ASSISTANT
Payer: MEDICAID

## 2017-11-01 DIAGNOSIS — R53.1 WEAKNESS: ICD-10-CM

## 2017-11-01 DIAGNOSIS — R26.2 DIFFICULTY WALKING: ICD-10-CM

## 2017-11-01 PROCEDURE — 97110 THERAPEUTIC EXERCISES: CPT

## 2017-11-01 NOTE — PROGRESS NOTES
"                                                    Physical Therapy Progress Note     Name: Candy [facility] Number: [id number]  Diagnosis:   Encounter Diagnoses   Name Primary?    Weakness     Difficulty walking      Physician: Nancy Manning PA-C  Treatment Orders: Therapeutic exercise  Past Medical History:   Diagnosis Date    ADHD (attention deficit hyperactivity disorder)     Anxiety        Precautions: standard    Evaluation Date: 10/20/17  Visit # authorized: 2/20  Authorization period: 12/31/17  Plan of care expiration: 1/19/17  Referring Provider: Nancy Manning PA-C    Subjective     Pt reports: on Monday she went to see Dr. Rober Moura to have cast removed, but the Saturday before the cast broke due to getting wet when it rained. Since cast was falling off she had her boyfriend remove the cast. When she went on Monday the  was overbooked, so she had to leave. Pt. was rescheduled for an appointment on 12/21/17.     Pain Scale: before treatment: n/a currently due to medication; after treatment: n/a    Objective     ROM: before treatment: ; after treatment: NT    Patient received individual therapy to increase strength, endurance, ROM, flexibility, posture and core stabilization with 1 patients with activities as follows:     Therapeutic exercise: Candy received therapeutic exercises to develop strength, endurance, ROM, flexibility, posture and core stabilization for 30 minutes including:     Supine:   · modified push/pull: 3x5"  · H.L. diaphragmatic breathing with tactile cuing for lateral costal expansion 2x8  · TA brace: 2x10  · TA brace with hip adduction ball squeeze: 2x8 with 3 sec. holds  · TA brace with knee fall out: 2x8  Sidelying:     Sitting:  Standing:    Manual therapy: Candy received the following manual therapy techniques: Joint mobilizations and Soft tissue Mobilization were applied to: left flank for 10 minutes.    Manual techniques include:  Soft tissue " mobilization:  · CFM incision sites  Joint mobilization:    Written Home Exercises Provided: [id number]: modified push/pull, brace knee fall out, and hip adduction with TA recruitment  Pt demo good understanding of the education provided. Candy demonstrated good return demonstration of activities.     Pt. education:  · Posture reeducation, body mechanics, HEP,activity modification/avoidance  · No spiritual or educational barriers to learning provided  · Pt has no cultural, educational or language barriers to learning provided.    Assessment     Good response to treatment denying increased pain. Pt. required recurrent verbal/tactile cues for proper diaphragmatic breathing and TA recruitment. Pt will continue to benefit from skilled outpatient physical therapy to address the remaining functional deficits, provide pt/family education, and to maximize pt's level of independence in the home and community environment.     Anticipated barriers to physical therapy: complicating factors and right navicular fx     · Pt's spiritual, cultural and educational needs considered and pt agreeable to plan of care and goals as stated below:      Medical necessity is demonstrated by the following IMPAIRMENTS/PROBLEM LIST:              1) Pain limiting function              2) Posture dysfunction              3) Core/Lumbar/LE weakness              4) Decreased thoracic/lumbar joint mobility              5) Decreased Lumbar ROM              6) Decreased soft tissue extensibility/fascia restriction              7) Decreased LE flexibility: bilateral hamstrings/sciatic nerve              8) Lack of HEP              9) Abnormal gait     GOALS:   Short Term Goals:  4 weeks  1. Report decreased lumbar pain </= 5/10 at worst to increase tolerance for prolonged sitting/standing  2. Pt. to demonstrate proper cervical and scapula retraction requiring min. to no verbal cues from PT  3. Pt. to demonstrate increased MMT for core/lumbar  paraspinals to 3/5 to increase endurance with prolonged sitting/standing.  4. Pt. to be independent with symptom management requiring minimal verbal cues from PT  5. Pt to tolerate HEP to improve ROM and independence with ADL's     Long Term Goals: 8 weeks  1. Report decreased lumbar pain </=  2/10 at worst to increase tolerance for prolonged sitting/standing/community ambulation  2. Pt. to demonstrate proper cervical and scapula retraction requiring no verbal cues from PT  3. Pt. to demonstrate increased MMT for core/lumbar paraspinals to 3+/5 to increase endurance with prolonged sitting.   4. Pt. to demonstrate increased MMT for right bilateral gluteus medius to 4+/5 to increase stability during ambulation on uneven surfaces.  5. Pt. to demonstrate increased MMT for bilateral hamstrings to 4+/5 to increase tolerance for ADL and work activities.   6. Pt to be independent with HEP to improve ROM and independence with ADL's  7. Pt. to ambulate community distances with LRD     Plan   Continue with established Plan of Care towards PT goals.

## 2017-11-03 ENCOUNTER — CLINICAL SUPPORT (OUTPATIENT)
Dept: REHABILITATION | Facility: HOSPITAL | Age: 47
End: 2017-11-03
Attending: PHYSICIAN ASSISTANT
Payer: MEDICAID

## 2017-11-03 DIAGNOSIS — R53.1 WEAKNESS: ICD-10-CM

## 2017-11-03 DIAGNOSIS — R26.2 DIFFICULTY WALKING: ICD-10-CM

## 2017-11-03 PROCEDURE — 97110 THERAPEUTIC EXERCISES: CPT

## 2017-11-03 NOTE — PROGRESS NOTES
"                                                    Physical Therapy Progress Note     Name: Candy [facility] Number: [id number]  Diagnosis:   Encounter Diagnoses   Name Primary?    Weakness     Difficulty walking      Physician: Nancy Manning PA-C  Treatment Orders: Therapeutic exercise  Past Medical History:   Diagnosis Date    ADHD (attention deficit hyperactivity disorder)     Anxiety        Precautions: standard    Evaluation Date: 10/20/17  Visit # authorized: 3/20  Authorization period: 12/31/17  Plan of care expiration: 1/19/17  Referring Provider: Nancy Manning PA-C    Subjective     Pt reports: she has had increased groin pain since last tx session; notes she could only perform her push/pull once or twice due to increased pain. She thinks some plywood in the bed may help with exercises in the bed.    Pain Scale: before treatment: n/a currently due to medication; after treatment: n/a    Objective     ROM: before treatment: ; after treatment: NT    Patient received individual therapy to increase strength, endurance, ROM, flexibility, posture and core stabilization with 1 patients with activities as follows:     Therapeutic exercise: Candy received therapeutic exercises to develop strength, endurance, ROM, flexibility, posture and core stabilization for 40 minutes including:     Supine:   · modified push/pull: 3x5"  · H.L. diaphragmatic breathing with tactile cuing for lateral costal expansion 2x8  · TA brace: 2x10  · TA brace with hip adduction ball squeeze: 2x8 with 3 sec. holds  · TA brace with knee fall out: 2x8  · TA brace with alternating march: 2x8  Sidelying:     Sitting:  Standing:    Manual therapy: Candy received the following manual therapy techniques: Joint mobilizations and Soft tissue Mobilization were applied to: left flank for 10 minutes.    Manual techniques include:  Soft tissue mobilization:  · CFM incision site  Joint mobilization:    Written Home Exercises " Provided: JSUQNSS: modified push/pull, brace knee fall out, and hip adduction with TA recruitment  Pt demo good understanding of the education provided. Candy demonstrated good return demonstration of activities.     Pt. education:  · Posture reeducation, body mechanics, HEP,activity modification/avoidance  · No spiritual or educational barriers to learning provided  · Pt has no cultural, educational or language barriers to learning provided.    Assessment     Good responded well to tx demonstrating improved diaphragmatic breathing and TA recruitment. Pt. required less verbal/tactile cues for proper muscle recruitment during exercise. Pt. reported mild hip pain once she began walking after tx session, which may be related to abnormal gait mechanics. Pt. was educated on the need to ambulate with heel toe gait and perform push/pull to assist with pain with submaximal contraction only. Pt. verbalized understanding. Will continue to progress as wicho. Pt. will f/u with Orthopedic regarding foot precautions/recommendations.    Pt will continue to benefit from skilled outpatient physical therapy to address the remaining functional deficits, provide pt/family education, and to maximize pt's level of independence in the home and community environment.     Anticipated barriers to physical therapy: complicating factors and right navicular fx     · Pt's spiritual, cultural and educational needs considered and pt agreeable to plan of care and goals as stated below:      Medical necessity is demonstrated by the following IMPAIRMENTS/PROBLEM LIST:              1) Pain limiting function              2) Posture dysfunction              3) Core/Lumbar/LE weakness              4) Decreased thoracic/lumbar joint mobility              5) Decreased Lumbar ROM              6) Decreased soft tissue extensibility/fascia restriction              7) Decreased LE flexibility: bilateral hamstrings/sciatic nerve              8) Lack of HEP               9) Abnormal gait     GOALS:   Short Term Goals:  4 weeks  1. Report decreased lumbar pain </= 5/10 at worst to increase tolerance for prolonged sitting/standing  2. Pt. to demonstrate proper cervical and scapula retraction requiring min. to no verbal cues from PT  3. Pt. to demonstrate increased MMT for core/lumbar paraspinals to 3/5 to increase endurance with prolonged sitting/standing.  4. Pt. to be independent with symptom management requiring minimal verbal cues from PT  5. Pt to tolerate HEP to improve ROM and independence with ADL's     Long Term Goals: 8 weeks  1. Report decreased lumbar pain </=  2/10 at worst to increase tolerance for prolonged sitting/standing/community ambulation  2. Pt. to demonstrate proper cervical and scapula retraction requiring no verbal cues from PT  3. Pt. to demonstrate increased MMT for core/lumbar paraspinals to 3+/5 to increase endurance with prolonged sitting.   4. Pt. to demonstrate increased MMT for right bilateral gluteus medius to 4+/5 to increase stability during ambulation on uneven surfaces.  5. Pt. to demonstrate increased MMT for bilateral hamstrings to 4+/5 to increase tolerance for ADL and work activities.   6. Pt to be independent with HEP to improve ROM and independence with ADL's  7. Pt. to ambulate community distances with LRD     Plan   Continue with established Plan of Care towards PT goals.

## 2017-11-06 ENCOUNTER — TELEPHONE (OUTPATIENT)
Dept: REHABILITATION | Facility: HOSPITAL | Age: 47
End: 2017-11-06
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
## 2017-11-07 ENCOUNTER — HOSPITAL ENCOUNTER (OUTPATIENT)
Dept: RADIOLOGY | Facility: HOSPITAL | Age: 47
Discharge: HOME | End: 2017-11-07
Attending: NEUROLOGICAL SURGERY
Payer: MEDICAID

## 2017-11-07 ENCOUNTER — OFFICE VISIT (OUTPATIENT)
Dept: NEUROSURGERY | Facility: CLINIC | Age: 47
End: 2017-11-07
Payer: MEDICAID

## 2017-11-07 VITALS
WEIGHT: 120 LBS | TEMPERATURE: 98 F | HEIGHT: 61 IN | BODY MASS INDEX: 22.66 KG/M2 | HEART RATE: 85 BPM | DIASTOLIC BLOOD PRESSURE: 70 MMHG | SYSTOLIC BLOOD PRESSURE: 120 MMHG

## 2017-11-07 DIAGNOSIS — Z98.1 S/P LUMBAR SPINAL FUSION: ICD-10-CM

## 2017-11-07 DIAGNOSIS — S32.020A TRAUMATIC COMPRESSION FRACTURE OF L2 LUMBAR VERTEBRA, CLOSED, INITIAL ENCOUNTER: Primary | ICD-10-CM

## 2017-11-07 DIAGNOSIS — Z98.1 S/P LUMBAR FUSION: ICD-10-CM

## 2017-11-07 PROCEDURE — 99213 OFFICE O/P EST LOW 20 MIN: CPT | Mod: PBBFAC,25 | Performed by: NEUROLOGICAL SURGERY

## 2017-11-07 PROCEDURE — 72100 X-RAY EXAM L-S SPINE 2/3 VWS: CPT | Mod: 26,,, | Performed by: RADIOLOGY

## 2017-11-07 PROCEDURE — 72100 X-RAY EXAM L-S SPINE 2/3 VWS: CPT | Mod: TC

## 2017-11-07 PROCEDURE — 99999 PR PBB SHADOW E&M-EST. PATIENT-LVL III: CPT | Mod: PBBFAC,,, | Performed by: NEUROLOGICAL SURGERY

## 2017-11-07 PROCEDURE — 99024 POSTOP FOLLOW-UP VISIT: CPT | Mod: ,,, | Performed by: NEUROLOGICAL SURGERY

## 2017-11-07 RX ORDER — CLONAZEPAM 2 MG/1
TABLET ORAL
Refills: 3 | COMMUNITY
Start: 2017-10-12

## 2017-11-07 NOTE — PROGRESS NOTES
Subjective:    I, Alisa Hickman, attest that this documentation has been prepared under the direction and in the presence of LUISITO Gasca MD.     Patient ID: Candy Blakely is a 47 y.o. female.    Chief Complaint: No chief complaint on file.    HPI   Pt is a 47 y.o. female s/p L2 corpectomy for L2 gross fracture. Pt states that she is doing well postoperatively. Pt does admit to continued tobacco use. Today pt is in a back brace and is using her bone stimulator.     Review of Systems   Constitutional: Negative for chills, fatigue and fever.   HENT: Negative for sinus pressure and trouble swallowing.    Eyes: Negative.  Negative for visual disturbance.   Respiratory: Negative.    Cardiovascular: Negative.    Gastrointestinal: Negative.  Negative for nausea and vomiting.   Endocrine: Negative.    Genitourinary: Negative.    Musculoskeletal: Negative.    Neurological: Negative for dizziness, seizures, syncope, speech difficulty, weakness and numbness.       Objective:      Physical Exam:  Nursing note and vitals reviewed.    Constitutional: She appears well-developed.     Eyes: Pupils are equal, round, and reactive to light. Conjunctivae and EOM are normal.     Cardiovascular: Normal rate, regular rhythm, normal pulses and intact distal pulses.     Abdominal: Soft.     Psych/Behavior: She is alert. She is oriented to person, place, and time. She has a normal mood and affect.     Musculoskeletal: Gait is normal.        Neck: Range of motion is full. There is no tenderness. Muscle strength is 5/5. Tone is normal.        Back: Range of motion is full. There is no tenderness. Muscle strength is 5/5. Tone is normal.        Right Upper Extremities: Range of motion is full. There is no tenderness. Muscle strength is 5/5. Tone is normal.        Left Upper Extremities: Range of motion is full. There is no tenderness. Muscle strength is 5/5. Tone is normal.       Right Lower Extremities: Range of motion is full. There is no  tenderness. Muscle strength is 5/5. Tone is normal.        Left Lower Extremities: Range of motion is full. There is no tenderness. Muscle strength is 5/5. Tone is normal.     Neurological:        Coordination: She has a normal Romberg Test, normal finger to nose coordination, normal heel to shin coordination and normal tandem walking coordination.        DTRs: DTRs are normal. Tricep reflexes are 2+ on the right side and 2+ on the left side. Bicep reflexes are 2+ on the right side and 2+ on the left side. Brachioradialis reflexes are 2+ on the right side and 2+ on the left side. Patellar reflexes are 2+ on the right side and 2+ on the left side. Achilles reflexes are 2+ on the right side and 2+ on the left side.        Cranial nerves: Cranial nerve(s) II, III, IV, V, VI, VII, VIII, IX, X, XI and XII are intact.       Pt doing well.  Full strength.   Her flank incision well healed.   She has a back brace in place.     Imaging:   X-ray L spine, dated 11/7/2017, shows hardware is in good position.     LUISITO SHELL MD, personally reviewed the imaging and interpreted independent of the radiology report.    Assessment/Plan:   Pt s/p L1-3 fusion, doing well. Unfortunately she has continued to smoke. We discussed the effects of smoking sensation. Plan to see her back in 4 months with one of our physician assistants.     LUISITO SHELL MD, personally performed the services described in this documentation. All medical record entries made by the scribe, Alisa Hickman, were at my direction and in my presence.  I have reviewed the chart and agree that the record reflects my personal performance and is accurate and complete.

## 2017-11-08 ENCOUNTER — TELEPHONE (OUTPATIENT)
Dept: NEUROSURGERY | Facility: CLINIC | Age: 47
End: 2017-11-08

## 2017-11-08 ENCOUNTER — CLINICAL SUPPORT (OUTPATIENT)
Dept: REHABILITATION | Facility: HOSPITAL | Age: 47
End: 2017-11-08
Attending: PHYSICIAN ASSISTANT
Payer: MEDICAID

## 2017-11-08 DIAGNOSIS — R26.2 DIFFICULTY WALKING: ICD-10-CM

## 2017-11-08 DIAGNOSIS — R53.1 WEAKNESS: ICD-10-CM

## 2017-11-08 PROCEDURE — 97110 THERAPEUTIC EXERCISES: CPT
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
## 2017-11-08 NOTE — TELEPHONE ENCOUNTER
----- Message from Johanne Callaway sent at 11/8/2017  4:30 PM CST -----  Contact: Pt  Pt has questions regarding her medication    Contact number [phone]  Thanks

## 2017-11-08 NOTE — PROGRESS NOTES
"                                                    Physical Therapy Progress Note     Name: Candy [facility] Number: [id number]  Diagnosis:   No diagnosis found.  Physician: Nancy Manning PA-C  Treatment Orders: Therapeutic exercise  Past Medical History:   Diagnosis Date    ADHD (attention deficit hyperactivity disorder)     Anxiety        Precautions: standard    Evaluation Date: 10/20/17  Visit # authorized: 4/20  Authorization period: 12/31/17  Plan of care expiration: 1/19/17  Referring Provider: Nancy Manning PA-C    Subjective     Pt reports:she has not been performing her exercises due to recent personal issues with not having medication, fall, and stress at home. Pt. did not f/u with Orthopedic regarding her ankle as well.    Pain Scale: before treatment: n/a currently due to medication; after treatment: n/a    Objective     Pt. arrived in clinic without tubigrip; notable right ankle swelling (PT applied size D tubigrip)    Patient received individual therapy to increase strength, endurance, ROM, flexibility, posture and core stabilization with 1 patients with activities as follows:     Therapeutic exercise: Candy received therapeutic exercises to develop strength, endurance, ROM, flexibility, posture and core stabilization for 40 minutes including:     Supine:   · modified push/pull: 3x5"  · H.L. diaphragmatic breathing with tactile cuing for lateral costal expansion 2x10  · TA brace with hip adduction ball squeeze: 2x10 with 3 sec. holds  · TA brace with knee fall out: 2x10  · TA brace with alternating march: 2x10  · TA brace with bridge: 2x8   Sidelying:     Sitting:  Standing:    Manual therapy: Candy received the following manual therapy techniques: Joint mobilizations and Soft tissue Mobilization were applied to: left flank for 15 minutes.    Manual techniques include:  Soft tissue mobilization:  · CFM incision site  Joint mobilization:    Written Home Exercises " Provided: JSUQNSS: modified push/pull, brace knee fall out, and hip adduction with TA recruitment  Pt demo good understanding of the education provided. Candy demonstrated good return demonstration of activities.     Pt. education:  · Posture reeducation, body mechanics, HEP,activity modification/avoidance  · No spiritual or educational barriers to learning provided  · Pt has no cultural, educational or language barriers to learning provided.    Assessment     Fair response to treatment session denying increased pain as a result only fatigue. Pt. reeducated on the need to comply with exercise regimen to assist with pain regulation and pelvis stability. Pt. verbalized understanding. Pt. reinforced to f/u with Orthopedic regarding f/u diagnostics for right navicular fx. Pt. reports she will f/u with Orthopedic regarding foot precautions/recommendations.    Pt will continue to benefit from skilled outpatient physical therapy to address the remaining functional deficits, provide pt/family education, and to maximize pt's level of independence in the home and community environment.     Anticipated barriers to physical therapy: complicating factors and right navicular fx     · Pt's spiritual, cultural and educational needs considered and pt agreeable to plan of care and goals as stated below:      Medical necessity is demonstrated by the following IMPAIRMENTS/PROBLEM LIST:              1) Pain limiting function              2) Posture dysfunction              3) Core/Lumbar/LE weakness              4) Decreased thoracic/lumbar joint mobility              5) Decreased Lumbar ROM              6) Decreased soft tissue extensibility/fascia restriction              7) Decreased LE flexibility: bilateral hamstrings/sciatic nerve              8) Lack of HEP              9) Abnormal gait     GOALS:   Short Term Goals:  4 weeks  1. Report decreased lumbar pain </= 5/10 at worst to increase tolerance for prolonged  sitting/standing  2. Pt. to demonstrate proper cervical and scapula retraction requiring min. to no verbal cues from PT  3. Pt. to demonstrate increased MMT for core/lumbar paraspinals to 3/5 to increase endurance with prolonged sitting/standing.  4. Pt. to be independent with symptom management requiring minimal verbal cues from PT  5. Pt to tolerate HEP to improve ROM and independence with ADL's     Long Term Goals: 8 weeks  1. Report decreased lumbar pain </=  2/10 at worst to increase tolerance for prolonged sitting/standing/community ambulation  2. Pt. to demonstrate proper cervical and scapula retraction requiring no verbal cues from PT  3. Pt. to demonstrate increased MMT for core/lumbar paraspinals to 3+/5 to increase endurance with prolonged sitting.   4. Pt. to demonstrate increased MMT for right bilateral gluteus medius to 4+/5 to increase stability during ambulation on uneven surfaces.  5. Pt. to demonstrate increased MMT for bilateral hamstrings to 4+/5 to increase tolerance for ADL and work activities.   6. Pt to be independent with HEP to improve ROM and independence with ADL's  7. Pt. to ambulate community distances with LRD     Plan   Continue with established Plan of Care towards PT goals.

## 2017-11-14 RX ORDER — OXYCODONE AND ACETAMINOPHEN 5; 325 MG/1; MG/1
1 TABLET ORAL EVERY 4 HOURS PRN
Qty: 60 TABLET | Refills: 0 | Status: SHIPPED | OUTPATIENT
Start: 2017-11-14

## 2017-11-14 NOTE — TELEPHONE ENCOUNTER
----- Message from Kota Marrero sent at 11/14/2017  8:49 AM CST -----  Contact: Self @ [phone]  Pt is asking for refill on oxyCODONE-acetaminophen (PERCOCET) 5-325 mg per tablet. Pt states she had to  script last time, but is asking it be sent to the pharmacy. Pt is asking for a call back to confirm.      [facility]/pharmacy #[id number] - [place]  [address]  [address]  [place] [address]  Phone: [phone] Fax: [phone]

## 2017-11-14 NOTE — TELEPHONE ENCOUNTER
Told patient her Rx refill  For percocet is at the . This will be her last one. If she is still in pain after this then we will need to send her to pain management or her pcp. Patient verbalized understanding. I also advised that her rx may not be able to be filled until the 17th or 18th depending on when she had it filled and if that's the case then she will have to wait until they fill it

## 2017-11-16 ENCOUNTER — TELEPHONE (OUTPATIENT)
Dept: REHABILITATION | Facility: HOSPITAL | Age: 47
End: 2017-11-16

## 2017-11-22 ENCOUNTER — TELEPHONE (OUTPATIENT)
Dept: REHABILITATION | Facility: HOSPITAL | Age: 47
End: 2017-11-22

## 2018-01-18 ENCOUNTER — HOSPITAL ENCOUNTER (EMERGENCY)
Facility: HOSPITAL | Age: 48
Discharge: HOME | End: 2018-01-19
Attending: EMERGENCY MEDICINE
Payer: MEDICAID

## 2018-01-18 DIAGNOSIS — T40.1X1A ACCIDENTAL OVERDOSE OF HEROIN, INITIAL ENCOUNTER: Primary | ICD-10-CM

## 2018-01-18 PROCEDURE — 99284 EMERGENCY DEPT VISIT MOD MDM: CPT

## 2018-01-18 RX ORDER — NALOXONE HYDROCHLORIDE 4 MG/.1ML
1 SPRAY NASAL ONCE
Qty: 1 EACH | Refills: 0 | Status: SHIPPED | OUTPATIENT
Start: 2018-01-18 | End: 2018-01-18

## 2018-01-19 VITALS
DIASTOLIC BLOOD PRESSURE: 89 MMHG | SYSTOLIC BLOOD PRESSURE: 144 MMHG | WEIGHT: 115 LBS | HEIGHT: 61 IN | TEMPERATURE: 99 F | RESPIRATION RATE: 18 BRPM | BODY MASS INDEX: 21.71 KG/M2 | OXYGEN SATURATION: 98 % | HEART RATE: 97 BPM

## 2018-01-19 NOTE — ED PROVIDER NOTES
Bolused with pain due to Encounter Date: 1/18/2018    SCRIBE #1 NOTE: I, Nanette Pathak, am scribing for, and in the presence of,  Dr. Sampson. I have scribed the entire note.       History     Chief Complaint   Patient presents with    Drug Overdose     Pt brought to ED via  EMS after Heroin overdose. PT was cyanotic upon EMS arrival. Pt given 4 mg Narcan and is AOX4 upon arrival to ED.      Time seen by provider: 11:31 PM    This is a 47 y.o. female who presents after an overdose. The patient has a history of drug abuse and states that she was shopping with a girlfriend in [place] and she snorted heroin at about 2200. Patient denies shooting the heroin. When the paramedics found the patient, she was not breathing and cyanotic. She responded to narcan and is now alert and oriented x 4. The patient can also ambulate without aid.       The history is provided by the patient and medical records.     Review of patient's allergies indicates:  No Known Allergies  Past Medical History:   Diagnosis Date    ADHD (attention deficit hyperactivity disorder)     Anxiety      Past Surgical History:   Procedure Laterality Date    SPINE SURGERY  10/04/2017    LUMBAR FUSION/MONGE    TUBAL LIGATION  2003     History reviewed. No pertinent family history.  Social History   Substance Use Topics    Smoking status: Current Every Day Smoker     Packs/day: 0.50     Types: Cigarettes    Smokeless tobacco: Current User    Alcohol use No     Review of Systems   Constitutional: Negative for fever.   HENT: Negative for sore throat.    Respiratory: Negative for shortness of breath.    Cardiovascular: Negative for chest pain.   Gastrointestinal: Negative for nausea.   Genitourinary: Negative for dysuria.   Musculoskeletal: Negative for back pain.   Skin: Negative for rash.   Neurological: Negative for weakness.   Hematological: Does not bruise/bleed easily.       Physical Exam     Initial Vitals [01/18/18 2218]   BP Pulse Resp Temp  SpO2   (!) 160/97 89 18 98.8 °F (37.1 °C) 99 %      MAP       118         Physical Exam    Nursing note and vitals reviewed.  Constitutional: She appears well-developed and well-nourished. No distress.   HENT:   Head: Normocephalic and atraumatic.   Mouth/Throat: Oropharynx is clear and moist.   Eyes: EOM are normal. Pupils are equal, round, and reactive to light.   Neck: Normal range of motion. Neck supple. No tracheal deviation present. No JVD present.   Cardiovascular: Normal rate, regular rhythm, normal heart sounds and intact distal pulses.   Pulmonary/Chest: Breath sounds normal. No respiratory distress.   Abdominal: Soft. Bowel sounds are normal. She exhibits no distension. There is no tenderness.   Musculoskeletal: Normal range of motion.   Neurological: She is alert and oriented to person, place, and time. She has normal strength.   Skin: Skin is warm and dry.   Psychiatric: Her behavior is normal. Thought content normal.         ED Course   Procedures  Labs Reviewed - No data to display          Medical Decision Making:   History:   Old Medical Records: I decided to obtain old medical records.  Initial Assessment:   Patient with straight forward heroin overdose, with resolution of symptoms s/p narcan. Patient was counciled regarding dangers of drug use. Denies using on a regular basis. Will discharge home with a prescription of narcan in case of another overdose.             Scribe Attestation:   Scribe #1: I performed the above scribed service and the documentation accurately describes the services I performed. I attest to the accuracy of the note.    I, Dr. Todd Sampson, personally performed the services described in this documentation.   All medical record entries made by the scribe were at my direction and in my presence.   I have reviewed the chart and agree that the record is accurate and complete.   Todd Sampson MD.  1:02 AM 01/19/2018         ED Course      Clinical Impression:   The  encounter diagnosis was Accidental overdose of heroin, initial encounter.    Disposition:   Disposition: Discharged  Condition: Stable                        Todd Sampson MD  01/19/18 0102

## 2018-01-19 NOTE — ED TRIAGE NOTES
Candy Blakely, a 47 y.o. female presents to the ED c/o accidental heroine overdose, denies SI/HI      Chief Complaint   Patient presents with    Drug Overdose     Pt brought to ED via  EMS after Heroin overdose. PT was cyanotic upon EMS arrival. Pt given 4 mg Narcan and is AOX4 upon arrival to ED.      Review of patient's allergies indicates:  No Known Allergies  Past Medical History:   Diagnosis Date    ADHD (attention deficit hyperactivity disorder)     Anxiety      Adult Physical Assessment  LOC: Candy Blakely, 47 y.o. female verified via two identifiers.  The patient is awake, alert, oriented and speaking appropriately at this time.  APPEARANCE: Patient resting comfortably and appears to be in no acute distress at this time. Patient is clean and well groomed, patient's clothing is properly fastened.  SKIN:The skin is warm and dry, color consistent with ethnicity, patient has normal skin turgor and moist mucus membranes, skin intact, no breakdown or brusing noted.  MUSCULOSKELETAL: Patient moving all extremities well, no obvious swelling or deformities noted.  RESPIRATORY: Airway is open and patent, respirations are spontaneous, patient has a normal effort and rate, no accessory muscle use noted.  CARDIAC: Patient has a normal rate and rhythm, no periphreal edema noted in any extremity, capillary refill < 3 seconds in all extremities  ABDOMEN: Soft and non tender to palpation, no abdominal distention noted. Bowel sounds present in all four quadrants.  NEUROLOGIC: Eyes open spontaneously, behavior appropriate to situation, follows commands, facial expression symmetrical, bilateral hand grasp equal and even, purposeful motor response noted, normal sensation in all extremities when touched with a finger.

## 2018-02-16 ENCOUNTER — DOCUMENTATION ONLY (OUTPATIENT)
Dept: REHABILITATION | Facility: HOSPITAL | Age: 48
End: 2018-02-16

## 2018-02-16 NOTE — PROGRESS NOTES
PHYSICAL THERAPY DISCHARGE SUMMARY     Name: Candy Blakely  [facility] Number: [id number]    Diagnosis: No diagnosis found.  Physician: No ref. provider found  Treatment Orders: Therapeutic exercise  Past Medical History:   Diagnosis Date    ADHD (attention deficit hyperactivity disorder)     Anxiety        Initial visit: 10/20/17  Date of Last visit: 11/8/17  Date of Discharge Note:  2/16/18  Total Visits Received: 4  Missed Visits: 4  ASSESSMENT   Status Towards Goals Met: non-compliance    Goals Not achieved and why: see above    Discharge reason : Pt has not re-scheduled further follow-up sessions    PLAN   This patient is discharged from Physical Therapy Services.     Medical necessity is demonstrated by the following IMPAIRMENTS/PROBLEM LIST:              1) Pain limiting function              2) Posture dysfunction              3) Core/Lumbar/LE weakness              4) Decreased thoracic/lumbar joint mobility              5) Decreased Lumbar ROM              6) Decreased soft tissue extensibility/fascia restriction              7) Decreased LE flexibility: bilateral hamstrings/sciatic nerve              8) Lack of HEP              9) Abnormal gait     GOALS:   Short Term Goals:  4 weeks  1. Report decreased lumbar pain </= 5/10 at worst to increase tolerance for prolonged sitting/standing  2. Pt. to demonstrate proper cervical and scapula retraction requiring min. to no verbal cues from PT  3. Pt. to demonstrate increased MMT for core/lumbar paraspinals to 3/5 to increase endurance with prolonged sitting/standing.  4. Pt. to be independent with symptom management requiring minimal verbal cues from PT  5. Pt to tolerate HEP to improve ROM and independence with ADL's     Long Term Goals: 8 weeks  1. Report decreased lumbar pain </=  2/10 at worst to increase tolerance for prolonged sitting/standing/community ambulation  2. Pt. to demonstrate proper cervical and scapula retraction requiring no verbal cues from  PT  3. Pt. to demonstrate increased MMT for core/lumbar paraspinals to 3+/5 to increase endurance with prolonged sitting.   4. Pt. to demonstrate increased MMT for right bilateral gluteus medius to 4+/5 to increase stability during ambulation on uneven surfaces.  5. Pt. to demonstrate increased MMT for bilateral hamstrings to 4+/5 to increase tolerance for ADL and work activities.   6. Pt to be independent with HEP to improve ROM and independence with ADL's  7. Pt. to ambulate community distances with LRD

## 2018-03-08 ENCOUNTER — HOSPITAL ENCOUNTER (OUTPATIENT)
Dept: RADIOLOGY | Facility: HOSPITAL | Age: 48
Discharge: HOME | End: 2018-03-08
Attending: NEUROLOGICAL SURGERY
Payer: MEDICAID

## 2018-03-08 DIAGNOSIS — S32.020A TRAUMATIC COMPRESSION FRACTURE OF L2 LUMBAR VERTEBRA, CLOSED, INITIAL ENCOUNTER: ICD-10-CM

## 2018-03-08 DIAGNOSIS — Z98.1 S/P LUMBAR FUSION: ICD-10-CM

## 2018-03-08 PROCEDURE — 72131 CT LUMBAR SPINE W/O DYE: CPT | Mod: 26,,, | Performed by: RADIOLOGY

## 2018-03-08 PROCEDURE — 72131 CT LUMBAR SPINE W/O DYE: CPT | Mod: TC

## 2018-03-19 ENCOUNTER — TELEPHONE (OUTPATIENT)
Dept: NEUROSURGERY | Facility: CLINIC | Age: 48
End: 2018-03-19

## 2018-03-19 NOTE — TELEPHONE ENCOUNTER
Scheduled appt for 04/03/2018 at 1500 notified the patients mother as well as put a copy of the appt card in the mail

## 2018-03-19 NOTE — TELEPHONE ENCOUNTER
----- Message from Tanya Winters sent at 3/16/2018  3:19 PM CDT -----  Contact: pt @ [phone]  Calling to schedule an appt with Dr. Gasca to get her results from  the CT scan. Please call.

## 2020-12-23 ENCOUNTER — TELEPHONE (OUTPATIENT)
Dept: NEUROSURGERY | Facility: CLINIC | Age: 50
End: 2020-12-23

## 2020-12-23 NOTE — TELEPHONE ENCOUNTER
Tried every number listed in chart for the last 2 weeks  Pt has not returned calls and current numbers are not correct  Has no referral and no imaging on file   Has not been seen in several years  Have to cancel appointment as it is not appropriate for her to be seen without further information

## 2022-05-18 ENCOUNTER — TELEPHONE (OUTPATIENT)
Dept: INTERNAL MEDICINE | Facility: CLINIC | Age: 52
End: 2022-05-18
Payer: MEDICAID

## 2022-05-18 ENCOUNTER — TELEPHONE (OUTPATIENT)
Dept: OBSTETRICS AND GYNECOLOGY | Facility: CLINIC | Age: 52
End: 2022-05-18
Payer: MEDICAID

## 2022-05-18 DIAGNOSIS — Z12.31 SCREENING MAMMOGRAM, ENCOUNTER FOR: Primary | ICD-10-CM

## 2022-05-18 NOTE — TELEPHONE ENCOUNTER
Returned call to pt. She did not answer,left vm  informing pt  that her mammogram order has been placed.

## 2022-05-18 NOTE — TELEPHONE ENCOUNTER
----- Message from Vincent Borrego sent at 5/18/2022  2:05 PM CDT -----  Contact: pt.  .Type:  Needs Medical Advice    Who Called: pt    Would the patient rather a call back or a response via [facility]? Call back  Best Call Back Number: [phone]  Additional Information: Pt. Would like to establish care with Dr. Cooper

## 2022-05-18 NOTE — TELEPHONE ENCOUNTER
----- Message from RAMON Mayberry sent at 5/18/2022  2:41 PM CDT -----  Regarding: FW: mammogram  Contact: pt  Order placed.  ----- Message -----  From: Iwona Tucker MA  Sent: 5/18/2022   2:23 PM CDT  To: RAMON Mayberry  Subject: mammogram                                        Please place order for mammogram.    Thanks!  ----- Message -----  From: Vincent Borrego  Sent: 5/18/2022   2:11 PM CDT  To: Candelario KEITH Staff    .Type:  Mammogram    Caller is requesting to schedule their annual mammogram appointment.  Order is not listed in EPIC.  Please enter order and contact patient to schedule.  Name of Caller:pt  Where would they like the mammogram performed?[facility]  Would the patient rather a call back or a response via [facility]? Call back  Best Call Back Number:[phone]  Additional Information:

## 2022-05-19 ENCOUNTER — TELEPHONE (OUTPATIENT)
Dept: INTERNAL MEDICINE | Facility: CLINIC | Age: 52
End: 2022-05-19
Payer: MEDICAID

## 2022-05-19 NOTE — TELEPHONE ENCOUNTER
----- Message from Tanya Sweeney sent at 5/19/2022 12:43 PM CDT -----  Regarding: returning  Contact: [phone]  Type:  Patient Returning Call    Who Called: self   Who Left Message for Patient: Kathy   Does the patient know what this is regarding?: appt   Would the patient rather a call back or a response via [facility]?  Call   Best Call Back Number: [phone]  Additional Information:          
Returned call, no answer. Voicemail left.   
yes

## 2022-07-18 ENCOUNTER — TELEPHONE (OUTPATIENT)
Dept: NEUROSURGERY | Facility: CLINIC | Age: 52
End: 2022-07-18
Payer: MEDICAID

## 2022-07-18 NOTE — TELEPHONE ENCOUNTER
Called - no answer. Left  for patient informing her that she was last seen over 3 years ago, so she is considered a new patient. I also informed her that we are not currently taking new medicaid patients. I provided the phone number for [facility] referrals.

## 2023-09-06 ENCOUNTER — HOSPITAL ENCOUNTER (OUTPATIENT)
Dept: RADIOLOGY | Facility: HOSPITAL | Age: 53
Discharge: HOME | End: 2023-09-06
Attending: NURSE PRACTITIONER
Payer: COMMERCIAL

## 2023-09-06 DIAGNOSIS — Z12.31 SCREENING MAMMOGRAM, ENCOUNTER FOR: ICD-10-CM

## 2023-09-06 PROCEDURE — 77067 MAMMO DIGITAL SCREENING BILAT WITH TOMO: ICD-10-PCS | Mod: 26,,, | Performed by: RADIOLOGY

## 2023-09-06 PROCEDURE — 77067 SCR MAMMO BI INCL CAD: CPT | Mod: 26,,, | Performed by: RADIOLOGY

## 2023-09-06 PROCEDURE — 77063 BREAST TOMOSYNTHESIS BI: CPT | Mod: 26,,, | Performed by: RADIOLOGY

## 2023-09-06 PROCEDURE — 77067 SCR MAMMO BI INCL CAD: CPT | Mod: TC

## 2023-09-06 PROCEDURE — 77063 MAMMO DIGITAL SCREENING BILAT WITH TOMO: ICD-10-PCS | Mod: 26,,, | Performed by: RADIOLOGY

## 2023-11-14 ENCOUNTER — TELEPHONE (OUTPATIENT)
Dept: GASTROENTEROLOGY | Facility: CLINIC | Age: 53
End: 2023-11-14
Payer: COMMERCIAL

## 2023-11-14 ENCOUNTER — OFFICE VISIT (OUTPATIENT)
Dept: OBSTETRICS AND GYNECOLOGY | Facility: CLINIC | Age: 53
End: 2023-11-14
Payer: COMMERCIAL

## 2023-11-14 ENCOUNTER — LAB VISIT (OUTPATIENT)
Dept: LAB | Facility: HOSPITAL | Age: 53
End: 2023-11-14
Attending: STUDENT IN AN ORGANIZED HEALTH CARE EDUCATION/TRAINING PROGRAM
Payer: COMMERCIAL

## 2023-11-14 VITALS
SYSTOLIC BLOOD PRESSURE: 140 MMHG | WEIGHT: 127.19 LBS | BODY MASS INDEX: 24.04 KG/M2 | DIASTOLIC BLOOD PRESSURE: 88 MMHG

## 2023-11-14 DIAGNOSIS — Z01.419 WELL WOMAN EXAM: Primary | ICD-10-CM

## 2023-11-14 DIAGNOSIS — Z01.419 WELL WOMAN EXAM: ICD-10-CM

## 2023-11-14 LAB
HAV IGM SERPL QL IA: ABNORMAL
HBV CORE IGM SERPL QL IA: ABNORMAL
HBV SURFACE AG SERPL QL IA: ABNORMAL
HCV AB SERPL QL IA: REACTIVE
HIV 1+2 AB+HIV1 P24 AG SERPL QL IA: NORMAL

## 2023-11-14 PROCEDURE — 80074 ACUTE HEPATITIS PANEL: CPT | Performed by: STUDENT IN AN ORGANIZED HEALTH CARE EDUCATION/TRAINING PROGRAM

## 2023-11-14 PROCEDURE — 99386 PR PREVENTIVE VISIT,NEW,40-64: ICD-10-PCS | Mod: S$GLB,,, | Performed by: STUDENT IN AN ORGANIZED HEALTH CARE EDUCATION/TRAINING PROGRAM

## 2023-11-14 PROCEDURE — 36415 COLL VENOUS BLD VENIPUNCTURE: CPT | Performed by: STUDENT IN AN ORGANIZED HEALTH CARE EDUCATION/TRAINING PROGRAM

## 2023-11-14 PROCEDURE — 87624 HPV HI-RISK TYP POOLED RSLT: CPT | Performed by: STUDENT IN AN ORGANIZED HEALTH CARE EDUCATION/TRAINING PROGRAM

## 2023-11-14 PROCEDURE — 87491 CHLMYD TRACH DNA AMP PROBE: CPT | Performed by: STUDENT IN AN ORGANIZED HEALTH CARE EDUCATION/TRAINING PROGRAM

## 2023-11-14 PROCEDURE — 99386 PREV VISIT NEW AGE 40-64: CPT | Mod: S$GLB,,, | Performed by: STUDENT IN AN ORGANIZED HEALTH CARE EDUCATION/TRAINING PROGRAM

## 2023-11-14 PROCEDURE — 81514 NFCT DS BV&VAGINITIS DNA ALG: CPT | Performed by: STUDENT IN AN ORGANIZED HEALTH CARE EDUCATION/TRAINING PROGRAM

## 2023-11-14 PROCEDURE — 88175 CYTOPATH C/V AUTO FLUID REDO: CPT | Performed by: STUDENT IN AN ORGANIZED HEALTH CARE EDUCATION/TRAINING PROGRAM

## 2023-11-14 PROCEDURE — 86780 TREPONEMA PALLIDUM: CPT | Performed by: STUDENT IN AN ORGANIZED HEALTH CARE EDUCATION/TRAINING PROGRAM

## 2023-11-14 PROCEDURE — 87389 HIV-1 AG W/HIV-1&-2 AB AG IA: CPT | Performed by: STUDENT IN AN ORGANIZED HEALTH CARE EDUCATION/TRAINING PROGRAM

## 2023-11-14 PROCEDURE — 86592 SYPHILIS TEST NON-TREP QUAL: CPT | Performed by: STUDENT IN AN ORGANIZED HEALTH CARE EDUCATION/TRAINING PROGRAM

## 2023-11-14 PROCEDURE — 87591 N.GONORRHOEAE DNA AMP PROB: CPT | Performed by: STUDENT IN AN ORGANIZED HEALTH CARE EDUCATION/TRAINING PROGRAM

## 2023-11-14 PROCEDURE — 99213 OFFICE O/P EST LOW 20 MIN: CPT | Mod: PBBFAC,PO | Performed by: STUDENT IN AN ORGANIZED HEALTH CARE EDUCATION/TRAINING PROGRAM

## 2023-11-14 PROCEDURE — 86593 SYPHILIS TEST NON-TREP QUANT: CPT | Performed by: STUDENT IN AN ORGANIZED HEALTH CARE EDUCATION/TRAINING PROGRAM

## 2023-11-14 PROCEDURE — 99999 PR PBB SHADOW E&M-EST. PATIENT-LVL III: ICD-10-PCS | Mod: PBBFAC,,, | Performed by: STUDENT IN AN ORGANIZED HEALTH CARE EDUCATION/TRAINING PROGRAM

## 2023-11-14 PROCEDURE — 99999 PR PBB SHADOW E&M-EST. PATIENT-LVL III: CPT | Mod: PBBFAC,,, | Performed by: STUDENT IN AN ORGANIZED HEALTH CARE EDUCATION/TRAINING PROGRAM

## 2023-11-14 NOTE — PROGRESS NOTES
"History & Physical  Gynecology      SUBJECTIVE:     Chief Complaint: Well Woman Exam       History of Present Illness:  53 y.o. female  here for annual GYN visit.   She describes her periods as regular, lasting 3-4 days with normal flow. She denies intermenstrual bleeding. She denies vaginal itching, irritation, or discharge.  Patient is sexually active with 1 male partner who was diagnosed with Syphilis in July. The patient then went to [facility] for treatment. She states that she was asked to give a urine test and was then treated with some medicines, She is unsure what she was treated for.  She does not use tobacco, alcohol or drugs. 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   She is currently unemployed, living with her Dad and reports feeling safe at home.     Last Pap: "20 years ago"  Last MM - BIRADS - 1  Last Colonoscopy: "never"    She denies a family history of breast or ovarian cancer.       Review of patient's allergies indicates:  No Known Allergies    Past Medical History:   Diagnosis Date    ADHD (attention deficit hyperactivity disorder)     Anxiety      Past Surgical History:   Procedure Laterality Date    MANDIBLE FRACTURE SURGERY  2022    SPINE SURGERY  10/04/2017    LUMBAR FUSION/MONGE    TUBAL LIGATION       OB History          3    Para   3    Term   3            AB        Living   3         SAB        IAB        Ectopic        Multiple        Live Births   3               Family History   Problem Relation Age of Onset    COPD Mother      Social History     Tobacco Use    Smoking status: Every Day     Current packs/day: 0.50     Types: Cigarettes    Smokeless tobacco: Current   Substance Use Topics    Alcohol use: No    Drug use: Yes     Types: IV     Comment: heroine       Current Outpatient Medications   Medication Sig    clonazePAM (KLONOPIN) 2 MG Tab TAKE 1 TABLET BY MOUTH 3 TIMES A DAY AS NEEDED FOR ANXIETY    " oxyCODONE-acetaminophen (PERCOCET) 5-325 mg per tablet Take 1 tablet by mouth every 4 (four) hours as needed.     No current facility-administered medications for this visit.       Review of Systems:  Review of Systems   Constitutional:  Negative for chills, fatigue and fever.   HENT:  Negative for congestion.    Eyes:  Negative for visual disturbance.   Respiratory:  Negative for cough and shortness of breath.    Cardiovascular:  Negative for chest pain and palpitations.   Gastrointestinal:  Negative for abdominal distention, abdominal pain, constipation, diarrhea, nausea and vomiting.   Genitourinary:  Negative for difficulty urinating, dysuria, hematuria, vaginal bleeding and vaginal discharge.   Skin:  Negative for rash.   Neurological:  Negative for dizziness, seizures, light-headedness and headaches.   Hematological:  Does not bruise/bleed easily.   Psychiatric/Behavioral:  Negative for dysphoric mood. The patient is not nervous/anxious.         OBJECTIVE:     Physical Exam:  Vitals:    11/14/23 0828   BP: (!) 140/88      Physical Exam  Vitals and nursing note reviewed. Exam conducted with a chaperone present.   Constitutional:       General: She is not in acute distress.     Appearance: She is well-developed.   HENT:      Head: Normocephalic and atraumatic.   Eyes:      Pupils: Pupils are equal, round, and reactive to light.   Cardiovascular:      Rate and Rhythm: Normal rate and regular rhythm.   Pulmonary:      Effort: Pulmonary effort is normal. No respiratory distress.   Chest:   Breasts:     Right: Normal.      Left: Normal.   Abdominal:      General: There is no distension.      Palpations: Abdomen is soft. There is no mass.      Tenderness: There is no abdominal tenderness. There is no guarding.   Genitourinary:     Comments: SSE: Normal external female genitalia, normal urethral meatus, normal vaginal rugae, normal vaginal mucosa, no vaginal blood in canal, normal physiologic discharge, normal cervix,  no adnexal masses palpated on bimanual  Musculoskeletal:         General: Normal range of motion.      Cervical back: Normal range of motion and neck supple.   Skin:     General: Skin is warm and dry.   Neurological:      Mental Status: She is alert and oriented to person, place, and time.   Psychiatric:         Behavior: Behavior normal.         Thought Content: Thought content normal.         Judgment: Judgment normal.         ASSESSMENT/PLAN:     1. Well woman exam  - Pap smear - Co-Testing Today  - Mammogram - Up to date  - Colonoscopy - Referral Sent  - Calcium and Vitamin D counseling  14 - 30 years old 1,300mg Ca/d; 600 IU vit D/d  31 - 50 years old 1,000 Ca/d; 600 IU vit D/d  51 - 70 year old: 1,200 Ca/d; 600 IU vit D/d  71+ years old 1,200 Ca/d; 800 IU vit D/d  - Liquid-Based Pap Smear, Screening  - HPV High Risk Genotypes, PCR  - HIV 1/2 Ag/Ab (4th Gen); Future  - RPR; Future  - Hepatitis Panel, Acute; Future  - C. trachomatis/N. gonorrhoeae by AMP DNA  - Vaginosis Screen by DNA Probe    Roxanna Tovar M.D.  OB/GYN  [facility]

## 2023-11-14 NOTE — LETTER
November 14, 2023    Candyshonda Blakely  [address]  [place] [address]             [facility]  [address]  [address]  [place] [address]  Phone: [phone] Dear Ms. Blakely:    We have attempted to contact you to schedule a screening colonoscopy that was ordered by your doctor. Please contact the office to schedule at [phone].      If you have any questions or concerns, please don't hesitate to call.    Sincerely,        Daphney Ly MA

## 2023-11-15 ENCOUNTER — TELEPHONE (OUTPATIENT)
Dept: OBSTETRICS AND GYNECOLOGY | Facility: CLINIC | Age: 53
End: 2023-11-15
Payer: COMMERCIAL

## 2023-11-15 ENCOUNTER — LAB VISIT (OUTPATIENT)
Dept: LAB | Facility: HOSPITAL | Age: 53
End: 2023-11-15
Attending: STUDENT IN AN ORGANIZED HEALTH CARE EDUCATION/TRAINING PROGRAM
Payer: COMMERCIAL

## 2023-11-15 DIAGNOSIS — R76.8 HEPATITIS C ANTIBODY POSITIVE IN BLOOD: ICD-10-CM

## 2023-11-15 DIAGNOSIS — R76.8 HEPATITIS C ANTIBODY POSITIVE IN BLOOD: Primary | ICD-10-CM

## 2023-11-15 LAB
BACTERIAL VAGINOSIS DNA: NEGATIVE
C TRACH DNA SPEC QL NAA+PROBE: NOT DETECTED
CANDIDA GLABRATA DNA: NEGATIVE
CANDIDA KRUSEI DNA: NEGATIVE
CANDIDA RRNA VAG QL PROBE: NEGATIVE
N GONORRHOEA DNA SPEC QL NAA+PROBE: NOT DETECTED
RPR SER QL: REACTIVE
RPR SER-TITR: ABNORMAL {TITER}
T VAGINALIS RRNA GENITAL QL PROBE: NEGATIVE

## 2023-11-15 PROCEDURE — 87522 HEPATITIS C REVRS TRNSCRPJ: CPT | Performed by: STUDENT IN AN ORGANIZED HEALTH CARE EDUCATION/TRAINING PROGRAM

## 2023-11-15 PROCEDURE — 36415 COLL VENOUS BLD VENIPUNCTURE: CPT | Performed by: STUDENT IN AN ORGANIZED HEALTH CARE EDUCATION/TRAINING PROGRAM

## 2023-11-15 NOTE — TELEPHONE ENCOUNTER
----- Message from Roxanna Tovar MD sent at 11/14/2023  9:01 AM CST -----  Hi- Can this patient be scheduled for a colonoscopy at [place]. Thanks.

## 2023-11-15 NOTE — TELEPHONE ENCOUNTER
Received critical lab value from [facility].     RPR reactive  Titer 1-8    Per Dr Tovar    Pt notified, waiting on FTA antibodies

## 2023-11-17 DIAGNOSIS — A53.0 LATENT SYPHILIS: Primary | ICD-10-CM

## 2023-11-17 LAB
HCV RNA SERPL QL NAA+PROBE: NOT DETECTED
HCV RNA SPEC NAA+PROBE-ACNC: NOT DETECTED IU/ML
T PALLIDUM AB SER QL IF: REACTIVE

## 2023-11-22 ENCOUNTER — PATIENT MESSAGE (OUTPATIENT)
Dept: OBSTETRICS AND GYNECOLOGY | Facility: CLINIC | Age: 53
End: 2023-11-22
Payer: COMMERCIAL

## 2023-11-22 ENCOUNTER — TELEPHONE (OUTPATIENT)
Dept: OBSTETRICS AND GYNECOLOGY | Facility: CLINIC | Age: 53
End: 2023-11-22
Payer: COMMERCIAL

## 2023-11-22 NOTE — TELEPHONE ENCOUNTER
----- Message from Roxanna Tovar MD sent at 11/17/2023  3:24 PM CST -----  Positive FTA confirming + Syphilis test. Will need treatment for latent syphilis. Please schedule. Orders placed.

## 2023-11-22 NOTE — TELEPHONE ENCOUNTER
Attempted to call patient multiple times; continued to go to busy signal. Tried calling patients mother; number not working. Bloxy message sent. Will send certified letter.

## 2023-11-30 ENCOUNTER — CLINICAL SUPPORT (OUTPATIENT)
Dept: OBSTETRICS AND GYNECOLOGY | Facility: CLINIC | Age: 53
End: 2023-11-30
Payer: MEDICAID

## 2023-11-30 DIAGNOSIS — A53.0 LATENT SYPHILIS: Primary | ICD-10-CM

## 2023-11-30 PROCEDURE — 96372 THER/PROPH/DIAG INJ SC/IM: CPT | Mod: PBBFAC,PO

## 2023-11-30 PROCEDURE — 99999PBSHW PR PBB SHADOW TECHNICAL ONLY FILED TO HB: ICD-10-PCS | Mod: JZ,PBBFAC,,

## 2023-11-30 PROCEDURE — 99999PBSHW PR PBB SHADOW TECHNICAL ONLY FILED TO HB: Mod: JZ,PBBFAC,,

## 2023-11-30 RX ADMIN — PENICILLIN G BENZATHINE 2.4 MILLION UNITS: 2400000 INJECTION, SUSPENSION INTRAMUSCULAR at 09:11

## 2024-03-09 ENCOUNTER — HOSPITAL ENCOUNTER (EMERGENCY)
Facility: HOSPITAL | Age: 54
Discharge: HOME | End: 2024-03-09
Attending: EMERGENCY MEDICINE
Payer: COMMERCIAL

## 2024-03-09 VITALS
BODY MASS INDEX: 24.19 KG/M2 | SYSTOLIC BLOOD PRESSURE: 152 MMHG | TEMPERATURE: 98 F | OXYGEN SATURATION: 100 % | HEART RATE: 79 BPM | WEIGHT: 128 LBS | RESPIRATION RATE: 15 BRPM | DIASTOLIC BLOOD PRESSURE: 92 MMHG

## 2024-03-09 DIAGNOSIS — R41.82 ALTERED MENTAL STATUS, UNSPECIFIED ALTERED MENTAL STATUS TYPE: Primary | ICD-10-CM

## 2024-03-09 DIAGNOSIS — R03.0 ELEVATED BLOOD PRESSURE READING: ICD-10-CM

## 2024-03-09 PROCEDURE — 99283 EMERGENCY DEPT VISIT LOW MDM: CPT

## 2024-03-09 RX ORDER — NALOXONE HYDROCHLORIDE 4 MG/.1ML
SPRAY NASAL
Qty: 1 EACH | Refills: 0 | Status: SHIPPED | OUTPATIENT
Start: 2024-03-09

## 2024-03-10 NOTE — DISCHARGE INSTRUCTIONS
Additional instructions  Followup with your primary care physician in 2-3 days if you are not improving. Take all your medications as prescribed. Return to the emergency department if you have increasing pain, chest pain, difficulty breathing,  nonstop vomiting, or any other concerns. Be sure to drink plenty of fluids to stay hydrated. Get plenty of rest. Please refer to additional educational material for further instructions.    If your blood pressure was over > 120/80 without history of hypertension you are advised to follow up with your PCP.  While elevated Blood pressures can be caused by many things including just coming to the emergency department, you will need to follow up with your primary care physician for further evaluation ideally in 2-3 days.       A Narcan prescription has been prescribed to you and you can also get this medication over the counter. It can reverse an opioid overdose. Please follow up with your PCP. Return to the ED for chest pain, shortness of breath, fever that does not respond to medications or any other concerns.     LIST OF ALCOHOL AND DRUG REHABILITATION RECOVERY PROGRAMS IN [place], [place]    [facility]  [place]_ Aftercare  Outpatient  Residential Long-Term  Substance Abuse Treatment  [facility]     [facility]   [address]  [facility]  [phone]. [phone]  [email or web address]  Continue Reading  ASK US ABOUT THIS FACILITY  BRIDGE TO FREEDOM - FRIDAY 8:00PM  Meetings     [facility] To [facility] - Friday 8:00PM  [address] [facility]  [phone]  [email or web address]  Continue Reading  ASK US ABOUT THIS FACILITY  [place] ON ALCOHOL & DRUG ABUSE FOR [facility]  Assessment  Outpatient  Prevention _ Education     [place] on Alcohol & Drug Abuse for [place] [address] [facility], 4th floor  [facility]  [phone]  [email or web address]  Continue Reading  ASK US  ABOUT THIS FACILITY  COURAGE TO CHANGE - THURSDAY 7:30PM  Meetings     Courage to Change - Thursday 7:30PM  [address] [facility]  [phone]  [email or web address]  Continue Reading  ASK US ABOUT THIS FACILITY  [facility]  [facility]  Residential Long-Term  Substance Abuse Treatment  [facility]     [facility]  [address] [facility]  [phone]  Continue Reading  ASK US ABOUT THIS FACILITY  HOPE, UNA & COURAGE GROUP - SUNDAY 8:00PM  Meetings     Hope, Una & Courage Group - Sunday 8:00PM   [address] [facility]  [phone]  [email or web address]      [facility]   [facility]  [address] [facility]  [phone]  [email or web address]    ROLLING 12 GROUP - MONDAY 7:00PM  Meetings     Rolling 12 Group - Monday 7:00PM  [address]  [phone]  [email or web address]    SATURDAY NIGHT LIVE - THURSDAY 6:00PM  Meetings     Saturday Night Live - Thursday 6:00PM  [address]  [facility]  [phone]  [email or web address]    TERESA IRWIN  Interventionists     Teresa Irwin  [address] [facility]  [phone]  [email or web address]    THE LAST STRAW GROUP - MONDAY 8:00PM  Meetings     The Last Straw Group - Monday 8:00PM  [address] [facility]  [phone]  [email or web address]    [facility]  Outpatient  Substance Abuse Treatment     [facility]  [address] [facility]  [phone]  [email or web address]    WE THINK NOT - WEDNESDAY 8:00PM  Meetings     We Think Not - Wednesday 8:00PM  [address]  [place]  [place]  [phone]  [email or web address]    [facility]  Free Treatment _ Low-Budget  Homeless Shelter     [facility]  [address] [place] [address]  [phone]  http://[email or web address]-can-help/programs-[email or web address]-services/[email or web address] discharge

## 2024-03-10 NOTE — ED PROVIDER NOTES
Chief Complaint: found altered in car     History of Present Illness:    Candy Blakely 54 y.o. with a  has a past medical history of ADHD (attention deficit hyperactivity disorder) and Anxiety. who presents to the emergency department today by EMS    Per EMS: pt was found in a running car altered and unresponsive.  Fire arrived gave her for intranasal Narcan and got a positive response.    Patient herself reports that she did not take anything tonight.  She was helping her brother who is known to use fentanyl, helping him clean up in making dinner for him.  That is all she remembers.  She has no complaints at this time.      ROS    Otherwise remaining ROS negative     The history is provided by the patient      Reviewed and verified by myself:   PMH/PSH/SOC/FH REVIEWED :    Past Medical History:   Diagnosis Date    ADHD (attention deficit hyperactivity disorder)     Anxiety        Past Surgical History:   Procedure Laterality Date    MANDIBLE FRACTURE SURGERY  03/28/2022    SPINE SURGERY  10/04/2017    LUMBAR FUSION/MONGE    TUBAL LIGATION  2003       Social History     Socioeconomic History    Marital status: Legally    Tobacco Use    Smoking status: Every Day     Current packs/day: 0.50     Types: Cigarettes    Smokeless tobacco: Current   Substance and Sexual Activity    Alcohol use: No    Drug use: Yes     Types: IV     Comment: heroine    Sexual activity: Yes     Partners: Male     Comment: Heroin occasionally       Family History   Problem Relation Age of Onset    COPD Mother                ALLERGIES REVIEWED  Review of patient's allergies indicates:  No Known Allergies    MEDICATIONS REVIEWED  Discharge Medication List as of 3/9/2024  9:37 PM        START taking these medications    Details   naloxone (NARCAN) 4 mg/actuation Spry 4mg by nasal route as needed for opioid overdose; may repeat every 2-3 minutes in alternating nostrils until medical help arrives. Call 911, Print           CONTINUE these  medications which have NOT CHANGED    Details   clonazePAM (KLONOPIN) 2 MG Tab TAKE 1 TABLET BY MOUTH 3 TIMES A DAY AS NEEDED FOR ANXIETY, Historical Med      oxyCODONE-acetaminophen (PERCOCET) 5-325 mg per tablet Take 1 tablet by mouth every 4 (four) hours as needed., Starting Tue 11/14/2017, Print                 VS reviewed    Nursing/Ancillary staff note reviewed.       Physical Exam     ED Triage Vitals [03/09/24 1925]   BP (!) 191/94   Pulse 94   Resp 15   Temp 98 °F (36.7 °C)   SpO2 100 %       Physical Exam    Constitutional: The patient is alert, has no immediate need for airway protection and no signs of toxicity. No acute distress. Lying in bed but able to sit up without difficulty.   Eyes: Pupils equal, pinpoint and round no pallor or injection. Extra ocular movements intact. No drainage.   ENT: Mucous membranes are moist. Oropharynx clear.   Respiratory: There are no retractions, lungs are clear to auscultation. No wheezing, no crackles.   Cardiovascular: Regular rate and rhythm.   Neurological: Alert and oriented x 4. CN II-XII grossly intact. No focal weakness. Strength intact 5/5 bilaterally in upper and lower extremities.   Skin: Warm and dry, no rashes.  Musculoskeletal: Extremities are non-tender, non-swollen and have full range of motion. Back NTTP along the midline.   Psyc: Normal behavior. Linear thought content.          ED Course                Medical Decision Making  Problems Addressed:  Altered mental status, unspecified altered mental status type: complicated acute illness or injury with systemic symptoms  Elevated blood pressure reading: undiagnosed new problem with uncertain prognosis    Amount and/or Complexity of Data Reviewed  External Data Reviewed: labs and notes.     Details:   Patient was seen 11/14/2023 by her OBGYN for her well-woman exam.  Was found to have syphilis, received IM penicillin 11/30/2023.    Risk  Prescription drug management.      Social determinants of health  taken into consideration during development of our treatment plan include   Smoking/tobacco use -  Smoking was the leading social determinant of health affecting mortality and life expectancy, although income was another strong SDOH predictor, according to researchers from the Center for Population Health at [facility] and the Department of Sociology at [facility]. LUDWIN Netw Open. 2022;5(4):a037891. doi:10.1001/jamanetworkopen.2022.6547    After consideration of the pts current home medications, the decision is made to maintain the current medication regimen.      Will start :  Discharge Medication List as of 3/9/2024  9:37 PM        START taking these medications    Details   naloxone (NARCAN) 4 mg/actuation Spry 4mg by nasal route as needed for opioid overdose; may repeat every 2-3 minutes in alternating nostrils until medical help arrives. Call 911, Print           OTC products such as tylenol or ibuprofen discussed for supplemental symptom control.         ED Management:  Differential diagnosis included but not limited to :  Overdose, intoxication, less likely to be stroke, head trauma, infectious  Comorbidity impacting this encounter includes:  has a past medical history of ADHD (attention deficit hyperactivity disorder) and Anxiety.      MDM continued:     Candy Blakely  presents to the emergency Department today after a possible opioid overdose, patient was unaware of consuming any opioids but was found altered, pinpoint pupils, responsive to Narcan.  Patient has been monitored for over 2 hours.  She has not need any repeat Narcan administration.  She reports feeling in her baseline.  She is appropriate for discharge home.  I will write her for Narcan IN.  Patient was appropriate for discharge home.         The pt is comfortable with this plan and comfortable going home at this time. After taking into careful account the historical factors and physical exam findings of the patient's  presentation today, in conjunction with the empirical and objective data obtained on ED workup, no acute emergent medical condition requiring admission has been identified. The patient appears to be low risk for an emergent medical condition and I feel it is safe and appropriate at this time for the patient to be discharged to follow-up as detailed in their discharge instructions for reevaluation and possible continued outpatient workup and management. Regardless, an unremarkable evaluation in the ED does not preclude the development or presence of a serious or life threatening condition. As such, patient was instructed to return immediately for any worsening or change in current symptoms. Precautions for return discussed at length.  Discharge and follow-up instructions discussed with the patient who expressed understanding and willingness to comply with my recommendations.    Voice recognition software utilized in this note.        Impression      The primary encounter diagnosis was Altered mental status, unspecified altered mental status type. A diagnosis of Elevated blood pressure reading was also pertinent to this visit.              Discharge Medication List as of 3/9/2024  9:37 PM        START taking these medications    Details   naloxone (NARCAN) 4 mg/actuation Spry 4mg by nasal route as needed for opioid overdose; may repeat every 2-3 minutes in alternating nostrils until medical help arrives. Call 911, Print              Follow-up Information       Schedule an appointment as soon as possible for a visit  with Your PCP.                               Patient advised to follow-up with PCP within 3 days for BP re-check if Blood Pressure was > 120/80 without history of hypertension           Sb Winters MD  03/09/24 7925

## 2024-03-10 NOTE — ED NOTES
See triage notes.     Pain:  Rated 0/10.     Psychosocial:  Patient is calm and cooperative.  Patients insight and judgement are appropriate to situation.  Appears clean, well maintained, with clothing appropriate to environment.  No evidence of delusions, hallucinations, or psychosis.     Neuro:  Eyes open spontaneously.  Awake, alert, oriented x 4.  Speech clear and appropriate.  Tolerating saliva secretions well.  Able to follow commands, demonstrating ability to actively and appropriately communicate within context of current conversation.  Symmetrical facial muscles.  Moving all extremities well with no noted weakness.  Adequate muscle tone present.    Movement is purposeful.         Airway:  Bilateral chest rise and fall.  RR regular and non-labored.  No crepitus or subcutaneous emphysema noted on palpation.       Circulatory:  Skin warm, dry, and pink.  Apical and radial pulses strong and regular.  Capillary refill/skin blanching less than 3 seconds to distal of upper extremities.     Extremities:  No redness, heat, swelling, deformity, or pain.     Skin:  Intact with no bruising/discolorations noted.     High Dose Vitamin A Pregnancy And Lactation Text: High dose vitamin A therapy is contraindicated during pregnancy and breast feeding.

## 2024-03-10 NOTE — ED NOTES
Rounding conducted, with no safety concerns identified.  Call bell within reach.  Bed locked in low position.  SR up x 2.  Care plan reviewed with patient and family.  Opportunities provided to ask questions, with answers to meet their needs. Patient pain reassessed.  Bathroom needs addressed.  No new complaints or noted concerns.  Awake.  Alert.  Oriented x 4.  Patient RR regular and non labored.  Skin remains warm dry and pink.

## 2024-03-10 NOTE — ED NOTES
Patient received for discharge.  Patient is awake, alert, and oriented x 4.  Speech clear and appropriate.  RR regular and non labored.  Skin W/D/P.  Given written and verbal discharge instruction, with verbal understanding.  Patient given the opportunity to ask questions, with answers to meet needs.  No complaints or noted concerns.  No pain.  Patient called family for ride.

## 2024-10-16 ENCOUNTER — PATIENT MESSAGE (OUTPATIENT)
Dept: ADMINISTRATIVE | Facility: OTHER | Age: 54
End: 2024-10-16
Payer: MEDICAID

## 2024-10-21 ENCOUNTER — HOSPITAL ENCOUNTER (EMERGENCY)
Facility: HOSPITAL | Age: 54
Discharge: HOME | End: 2024-10-21
Attending: EMERGENCY MEDICINE
Payer: COMMERCIAL

## 2024-10-21 VITALS
BODY MASS INDEX: 24.17 KG/M2 | OXYGEN SATURATION: 99 % | HEART RATE: 88 BPM | RESPIRATION RATE: 18 BRPM | SYSTOLIC BLOOD PRESSURE: 140 MMHG | WEIGHT: 128 LBS | DIASTOLIC BLOOD PRESSURE: 83 MMHG | TEMPERATURE: 98 F | HEIGHT: 61 IN

## 2024-10-21 DIAGNOSIS — T14.8XXA MUSCLE STRAIN: Primary | ICD-10-CM

## 2024-10-21 DIAGNOSIS — M54.2 NECK PAIN: ICD-10-CM

## 2024-10-21 PROCEDURE — 99284 EMERGENCY DEPT VISIT MOD MDM: CPT

## 2024-10-21 PROCEDURE — 25000003 PHARM REV CODE 250: Performed by: NURSE PRACTITIONER

## 2024-10-21 RX ORDER — LIDOCAINE 50 MG/G
1 PATCH TOPICAL DAILY
Qty: 7 PATCH | Refills: 0 | Status: SHIPPED | OUTPATIENT
Start: 2024-10-21 | End: 2024-10-28

## 2024-10-21 RX ORDER — LIDOCAINE 50 MG/G
1 PATCH TOPICAL
Status: DISCONTINUED | OUTPATIENT
Start: 2024-10-21 | End: 2024-10-21 | Stop reason: HOSPADM

## 2024-10-21 RX ORDER — METHOCARBAMOL 500 MG/1
500 TABLET, FILM COATED ORAL
Status: COMPLETED | OUTPATIENT
Start: 2024-10-21 | End: 2024-10-21

## 2024-10-21 RX ORDER — MELOXICAM 15 MG/1
15 TABLET ORAL DAILY
Qty: 7 TABLET | Refills: 0 | Status: SHIPPED | OUTPATIENT
Start: 2024-10-21 | End: 2024-10-28

## 2024-10-21 RX ORDER — KETOROLAC TROMETHAMINE 10 MG/1
10 TABLET, FILM COATED ORAL
Status: COMPLETED | OUTPATIENT
Start: 2024-10-21 | End: 2024-10-21

## 2024-10-21 RX ORDER — METHOCARBAMOL 500 MG/1
500 TABLET, FILM COATED ORAL 3 TIMES DAILY
Qty: 15 TABLET | Refills: 0 | Status: SHIPPED | OUTPATIENT
Start: 2024-10-21 | End: 2024-10-26

## 2024-10-21 RX ADMIN — KETOROLAC TROMETHAMINE 10 MG: 10 TABLET, FILM COATED ORAL at 10:10

## 2024-10-21 RX ADMIN — LIDOCAINE 1 PATCH: 50 PATCH CUTANEOUS at 10:10

## 2024-10-21 RX ADMIN — METHOCARBAMOL 500 MG: 500 TABLET ORAL at 10:10

## 2024-10-21 NOTE — ED TRIAGE NOTES
"55 yo female reports to ed with co R sided neck pain onset 3 days ago after waking up. Denies injury or trauma. States pain is slightly improved with aleve. Endorses "pulling" pains with movement of neck. Aaox4.   "

## 2024-10-21 NOTE — DISCHARGE INSTRUCTIONS

## 2024-10-21 NOTE — ED PROVIDER NOTES
Encounter Date: 10/21/2024       History     Chief Complaint   Patient presents with    Neck Pain     Patient reports to the ED complaining of neck pain and stiffness that started approximately 3 days ago. Patient states she woke up with the pain on the day it started. Patient denies trauma. Patient endorses pain with lateral movement. NAD noted.     54 yr old female presents to the ER with reports of neck pain/right trapezius muscle pain that began when waking up 3 days ago. Denies chest pain or sob. Pt states she was taking otc meds with minimal relief of symptoms. Denies injury. Denies radiation of pain. Denies fever, rash. No headache. PMH of ADHD and anxiety    The history is provided by the patient. No  was used.     Review of patient's allergies indicates:  No Known Allergies  Past Medical History:   Diagnosis Date    ADHD (attention deficit hyperactivity disorder)     Anxiety      Past Surgical History:   Procedure Laterality Date    MANDIBLE FRACTURE SURGERY  03/28/2022    SPINE SURGERY  10/04/2017    LUMBAR FUSION/MONGE    TUBAL LIGATION  2003     Family History   Problem Relation Name Age of Onset    COPD Mother       Social History     Tobacco Use    Smoking status: Every Day     Current packs/day: 0.50     Types: Cigarettes    Smokeless tobacco: Current   Substance Use Topics    Alcohol use: No    Drug use: Not Currently     Types: IV     Comment: heroine     Review of Systems   Musculoskeletal:  Positive for neck pain.   All other systems reviewed and are negative.      Physical Exam     Initial Vitals [10/21/24 0956]   BP Pulse Resp Temp SpO2   (!) 161/94 97 16 97.9 °F (36.6 °C) 99 %      MAP       --         Physical Exam    Constitutional: She appears well-developed and well-nourished.   HENT:   Head: Normocephalic.   Right Ear: Hearing and tympanic membrane normal.   Left Ear: Hearing and tympanic membrane normal.   Nose: Nose normal. Mouth/Throat: Oropharynx is clear and moist.    Eyes: Lids are normal. Pupils are equal, round, and reactive to light.   Neck:       Tenderness noted to the marked location. No rash or erythema noted. No swelling.    Normal range of motion.  Cardiovascular:  Normal rate.           Pulmonary/Chest: Breath sounds normal. No respiratory distress. She has no wheezes. She has no rhonchi.   Abdominal: Abdomen is soft. There is no abdominal tenderness.   Musculoskeletal:         General: Normal range of motion.      Cervical back: Normal range of motion. No edema, erythema or rigidity. Muscular tenderness present. Normal range of motion.     Neurological: She is alert and oriented to person, place, and time.   Skin: Skin is warm and dry. No rash noted.   Psychiatric: She has a normal mood and affect. Her behavior is normal. Judgment and thought content normal.         ED Course   Procedures  Labs Reviewed - No data to display       Imaging Results    None          Medications   methocarbamoL tablet 500 mg (500 mg Oral Given 10/21/24 1020)   ketorolac tablet 10 mg (10 mg Oral Given 10/21/24 1020)     Medical Decision Making  Differential Diagnosis includes, but is not limited to:  Fracture, dislocation, nerve injury/palsy, hemarthrosis, bursitis, muscle strain, ligament tear/sprain       Risk  Prescription drug management.               ED Course as of 10/21/24 1714   Mon Oct 21, 2024   1200 Pt reports much improvement in her condition after meds given. States the pain has almost completely resolved. Likely muscular strain in nature.  Pt is not toxic in appearance. Will be prescribed meds for home use. STRICT return precautions given otherwise stable for dc.  [DT]      ED Course User Index  [DT] Gillian Degroot NP                           Clinical Impression:  Final diagnoses:  [T14.8XXA] Muscle strain (Primary)  [M54.2] Neck pain          ED Disposition Condition    Discharge Stable          ED Prescriptions       Medication Sig Dispense Start Date End Date Auth.  Provider    methocarbamoL (ROBAXIN) 500 MG Tab Take 1 tablet (500 mg total) by mouth 3 (three) times daily. for 5 days 15 tablet 10/21/2024 10/26/2024 Gillian Degroot NP    LIDOcaine (LIDODERM) 5 % Place 1 patch onto the skin once daily. Remove & Discard patch within 12 hours or as directed by MD for 7 days 7 patch 10/21/2024 10/28/2024 Gillian Degroot NP    meloxicam (MOBIC) 15 MG tablet Take 1 tablet (15 mg total) by mouth once daily. for 7 days 7 tablet 10/21/2024 10/28/2024 Gillian Degroot NP          Follow-up Information       Follow up With Specialties Details Why Contact Info Additional Information    [facility] Schedule an appointment as soon as possible for a visit in 2 days  [address]  [place] [address] [phone] Please park in [address] or [address] and use [address]. Take Medical Office Bldg. elevators.             Gillian Degroot NP  10/21/24 3918

## 2025-02-12 DIAGNOSIS — Z12.31 SCREENING MAMMOGRAM, ENCOUNTER FOR: Primary | ICD-10-CM

## 2025-07-28 ENCOUNTER — HOSPITAL ENCOUNTER (EMERGENCY)
Facility: HOSPITAL | Age: 55
Discharge: HOME | End: 2025-07-28
Attending: EMERGENCY MEDICINE
Payer: COMMERCIAL

## 2025-07-28 VITALS
RESPIRATION RATE: 18 BRPM | HEART RATE: 105 BPM | SYSTOLIC BLOOD PRESSURE: 145 MMHG | BODY MASS INDEX: 27.38 KG/M2 | WEIGHT: 145 LBS | TEMPERATURE: 98 F | DIASTOLIC BLOOD PRESSURE: 86 MMHG | HEIGHT: 61 IN | OXYGEN SATURATION: 98 %

## 2025-07-28 DIAGNOSIS — S99.929A FOOT INJURY: ICD-10-CM

## 2025-07-28 DIAGNOSIS — S99.919A ANKLE INJURY: ICD-10-CM

## 2025-07-28 DIAGNOSIS — S92.351A CLOSED DISPLACED FRACTURE OF FIFTH METATARSAL BONE OF RIGHT FOOT, INITIAL ENCOUNTER: Primary | ICD-10-CM

## 2025-07-28 PROCEDURE — 99283 EMERGENCY DEPT VISIT LOW MDM: CPT | Mod: 25

## (undated) DEVICE — TRAY FOLEY 16FR INFECTION CONT

## (undated) DEVICE — CARTRIDGE OIL

## (undated) DEVICE — KIT EVACUATOR 3-SPRING 1/8 DRN

## (undated) DEVICE — GAUZE SPONGE PEANUT STRL

## (undated) DEVICE — SEE MEDLINE ITEM 156905

## (undated) DEVICE — NDL HYPO A BEVEL 22X1 1/2

## (undated) DEVICE — DRAIN CHANNEL ROUND 10FR

## (undated) DEVICE — COLLECTOR SPECIMAN ARTHRO

## (undated) DEVICE — SEE MEDLINE ITEM 157150

## (undated) DEVICE — BIT DRILL NEURO AGGR

## (undated) DEVICE — FORCEP BPLR BAYONETTED STR

## (undated) DEVICE — ELECTRODE REM PLYHSV RETURN 9

## (undated) DEVICE — KIT SURGIFLO EVITHROM

## (undated) DEVICE — DRESSING AQUACEL FOAM 5 X 5

## (undated) DEVICE — MARKER SKIN STND TIP BLUE BARR

## (undated) DEVICE — TAPE SILK 3IN

## (undated) DEVICE — DRAPE C ARM 42 X 120 10/BX

## (undated) DEVICE — Device

## (undated) DEVICE — CATH PIGTAIL

## (undated) DEVICE — HEMOSTAT SURGICEL 2X3IN

## (undated) DEVICE — SPONGE GELFOAM 12-7MM

## (undated) DEVICE — DRAPE MICROSCOPE OPMI

## (undated) DEVICE — SUT 2-0 VICRYL / CT-1

## (undated) DEVICE — DRESSING ABSRBNT ISLAND 3.6X8

## (undated) DEVICE — DRAPE STERI INSTRUMENT 1018

## (undated) DEVICE — DISSECTOR 5MM ENDOPATH

## (undated) DEVICE — KNIFE ANNULOTOMY BAYONETTED

## (undated) DEVICE — SUT MONOCRYL 4-0 PS-1 UND

## (undated) DEVICE — EVACUATOR WOUND BULB 100CC

## (undated) DEVICE — PROBE INSULATED 8IN STRL

## (undated) DEVICE — KIT EVACUATOR FLAT DRAIN 100CC

## (undated) DEVICE — BLADE ELECTRO EXTENDED.

## (undated) DEVICE — DURAPREP SURG SCRUB 26ML

## (undated) DEVICE — DRAPE STERI-DRAPE 1000 17X11IN

## (undated) DEVICE — CATH SUCTION 10FR

## (undated) DEVICE — SPONGE GAUZE 16PLY 4X4

## (undated) DEVICE — CORD BIPOLAR 12 FOOT

## (undated) DEVICE — CLOSURE SKIN STERI STRIP 1/2X4

## (undated) DEVICE — DRESSING MEPILEX BORDER 4 X 4

## (undated) DEVICE — TUBE FRAZIER 5MM 2FT SOFT TIP

## (undated) DEVICE — CAUTERY BOVIE PENCIL

## (undated) DEVICE — NDL SPINAL 18GX3.5 SPINOCAN

## (undated) DEVICE — SEE MEDLINE ITEM 157148

## (undated) DEVICE — DRAPE C-ARMOR EQUIPMENT COVER

## (undated) DEVICE — DRAPE INCISE IOBAN 2 23X17IN

## (undated) DEVICE — SUT VICRYL BR 1 GEN 27 CT-1

## (undated) DEVICE — DIFFUSER

## (undated) DEVICE — KIT SURGIFLO HEMOSTATIC MATRIX

## (undated) DEVICE — DRESSING SURGICAL 1X3

## (undated) DEVICE — SYR ONLY LUER LOCK 20CC

## (undated) DEVICE — PEDICLE ACCESS KIT

## (undated) DEVICE — SEE MEDLINE ITEM 157131